# Patient Record
Sex: FEMALE | Race: BLACK OR AFRICAN AMERICAN | NOT HISPANIC OR LATINO | Employment: OTHER | URBAN - METROPOLITAN AREA
[De-identification: names, ages, dates, MRNs, and addresses within clinical notes are randomized per-mention and may not be internally consistent; named-entity substitution may affect disease eponyms.]

---

## 2018-11-09 ENCOUNTER — HOSPITAL ENCOUNTER (INPATIENT)
Facility: HOSPITAL | Age: 68
LOS: 20 days | Discharge: HOME | DRG: 057 | End: 2018-11-29
Attending: PHYSICAL MEDICINE & REHABILITATION | Admitting: PHYSICAL MEDICINE & REHABILITATION
Payer: MEDICARE

## 2018-11-09 DIAGNOSIS — I63.9 CVA (CEREBRAL VASCULAR ACCIDENT): Primary | ICD-10-CM

## 2018-11-09 LAB — POCT GLUCOSE: 272 MG/DL (ref 70–110)

## 2018-11-09 PROCEDURE — 63600175 PHARM REV CODE 636 W HCPCS: Performed by: PHYSICAL MEDICINE & REHABILITATION

## 2018-11-09 PROCEDURE — 12800000 HC REHAB SEMI-PRIVATE ROOM

## 2018-11-09 PROCEDURE — 25000003 PHARM REV CODE 250: Performed by: PHYSICAL MEDICINE & REHABILITATION

## 2018-11-09 RX ORDER — LOSARTAN POTASSIUM 25 MG/1
25 TABLET ORAL DAILY
Status: DISCONTINUED | OUTPATIENT
Start: 2018-11-10 | End: 2018-11-11

## 2018-11-09 RX ORDER — IBUPROFEN 200 MG
24 TABLET ORAL
Status: DISCONTINUED | OUTPATIENT
Start: 2018-11-09 | End: 2018-11-29 | Stop reason: HOSPADM

## 2018-11-09 RX ORDER — AMLODIPINE BESYLATE 5 MG/1
10 TABLET ORAL DAILY
Status: DISCONTINUED | OUTPATIENT
Start: 2018-11-10 | End: 2018-11-29 | Stop reason: HOSPADM

## 2018-11-09 RX ORDER — METFORMIN HYDROCHLORIDE 500 MG/1
1000 TABLET ORAL 2 TIMES DAILY WITH MEALS
Status: DISCONTINUED | OUTPATIENT
Start: 2018-11-09 | End: 2018-11-11 | Stop reason: DRUGHIGH

## 2018-11-09 RX ORDER — NAPROXEN SODIUM 220 MG/1
81 TABLET, FILM COATED ORAL DAILY
Status: DISCONTINUED | OUTPATIENT
Start: 2018-11-10 | End: 2018-11-29 | Stop reason: HOSPADM

## 2018-11-09 RX ORDER — ACETAMINOPHEN 325 MG/1
650 TABLET ORAL EVERY 6 HOURS PRN
Status: DISCONTINUED | OUTPATIENT
Start: 2018-11-09 | End: 2018-11-29 | Stop reason: HOSPADM

## 2018-11-09 RX ORDER — GLUCAGON 1 MG
1 KIT INJECTION
Status: DISCONTINUED | OUTPATIENT
Start: 2018-11-09 | End: 2018-11-29 | Stop reason: HOSPADM

## 2018-11-09 RX ORDER — HYDROCHLOROTHIAZIDE 12.5 MG/1
12.5 CAPSULE ORAL DAILY
Status: DISCONTINUED | OUTPATIENT
Start: 2018-11-10 | End: 2018-11-10

## 2018-11-09 RX ORDER — ATORVASTATIN CALCIUM 40 MG/1
80 TABLET, FILM COATED ORAL NIGHTLY
Status: DISCONTINUED | OUTPATIENT
Start: 2018-11-09 | End: 2018-11-29 | Stop reason: HOSPADM

## 2018-11-09 RX ORDER — IBUPROFEN 200 MG
16 TABLET ORAL
Status: DISCONTINUED | OUTPATIENT
Start: 2018-11-09 | End: 2018-11-29 | Stop reason: HOSPADM

## 2018-11-09 RX ORDER — CLOPIDOGREL BISULFATE 75 MG/1
75 TABLET ORAL DAILY
Status: DISCONTINUED | OUTPATIENT
Start: 2018-11-10 | End: 2018-11-29 | Stop reason: HOSPADM

## 2018-11-09 RX ORDER — ONDANSETRON 4 MG/1
4 TABLET, ORALLY DISINTEGRATING ORAL EVERY 6 HOURS PRN
Status: DISCONTINUED | OUTPATIENT
Start: 2018-11-09 | End: 2018-11-29 | Stop reason: HOSPADM

## 2018-11-09 RX ORDER — INSULIN ASPART 100 [IU]/ML
0-5 INJECTION, SOLUTION INTRAVENOUS; SUBCUTANEOUS
Status: DISCONTINUED | OUTPATIENT
Start: 2018-11-09 | End: 2018-11-29 | Stop reason: HOSPADM

## 2018-11-09 RX ADMIN — ATORVASTATIN CALCIUM 80 MG: 40 TABLET, FILM COATED ORAL at 09:11

## 2018-11-09 RX ADMIN — ACETAMINOPHEN 650 MG: 325 TABLET, FILM COATED ORAL at 09:11

## 2018-11-09 RX ADMIN — INSULIN ASPART 1 UNITS: 100 INJECTION, SOLUTION INTRAVENOUS; SUBCUTANEOUS at 10:11

## 2018-11-09 RX ADMIN — ONDANSETRON 4 MG: 4 TABLET, ORALLY DISINTEGRATING ORAL at 09:11

## 2018-11-10 LAB
ALBUMIN SERPL BCP-MCNC: 3.4 G/DL
ALP SERPL-CCNC: 90 U/L
ALT SERPL W/O P-5'-P-CCNC: 13 U/L
ANION GAP SERPL CALC-SCNC: 17 MMOL/L
AST SERPL-CCNC: 19 U/L
BILIRUB SERPL-MCNC: 0.4 MG/DL
BUN SERPL-MCNC: 69 MG/DL
CALCIUM SERPL-MCNC: 9.8 MG/DL
CHLORIDE SERPL-SCNC: 91 MMOL/L
CO2 SERPL-SCNC: 20 MMOL/L
CREAT SERPL-MCNC: 5.4 MG/DL
ERYTHROCYTE [DISTWIDTH] IN BLOOD BY AUTOMATED COUNT: 12.7 %
EST. GFR  (AFRICAN AMERICAN): 9 ML/MIN/1.73 M^2
EST. GFR  (NON AFRICAN AMERICAN): 8 ML/MIN/1.73 M^2
GLUCOSE SERPL-MCNC: 230 MG/DL
HCT VFR BLD AUTO: 39.2 %
HGB BLD-MCNC: 13.2 G/DL
MCH RBC QN AUTO: 28.8 PG
MCHC RBC AUTO-ENTMCNC: 33.7 G/DL
MCV RBC AUTO: 86 FL
PLATELET # BLD AUTO: 357 K/UL
PMV BLD AUTO: 8.3 FL
POCT GLUCOSE: 230 MG/DL (ref 70–110)
POCT GLUCOSE: 266 MG/DL (ref 70–110)
POTASSIUM SERPL-SCNC: 5.1 MMOL/L
PROT SERPL-MCNC: 7.9 G/DL
RBC # BLD AUTO: 4.58 M/UL
SODIUM SERPL-SCNC: 128 MMOL/L
WBC # BLD AUTO: 14.6 K/UL

## 2018-11-10 PROCEDURE — 97162 PT EVAL MOD COMPLEX 30 MIN: CPT

## 2018-11-10 PROCEDURE — 12800000 HC REHAB SEMI-PRIVATE ROOM

## 2018-11-10 PROCEDURE — 63600175 PHARM REV CODE 636 W HCPCS: Performed by: PHYSICAL MEDICINE & REHABILITATION

## 2018-11-10 PROCEDURE — 25000003 PHARM REV CODE 250: Performed by: PHYSICAL MEDICINE & REHABILITATION

## 2018-11-10 PROCEDURE — 97530 THERAPEUTIC ACTIVITIES: CPT

## 2018-11-10 PROCEDURE — 92523 SPEECH SOUND LANG COMPREHEN: CPT

## 2018-11-10 PROCEDURE — 80053 COMPREHEN METABOLIC PANEL: CPT

## 2018-11-10 PROCEDURE — 36415 COLL VENOUS BLD VENIPUNCTURE: CPT

## 2018-11-10 PROCEDURE — 97167 OT EVAL HIGH COMPLEX 60 MIN: CPT

## 2018-11-10 PROCEDURE — 85027 COMPLETE CBC AUTOMATED: CPT

## 2018-11-10 RX ORDER — ENOXAPARIN SODIUM 100 MG/ML
30 INJECTION SUBCUTANEOUS EVERY 24 HOURS
Status: DISCONTINUED | OUTPATIENT
Start: 2018-11-10 | End: 2018-11-13

## 2018-11-10 RX ORDER — SODIUM CHLORIDE 450 MG/100ML
INJECTION, SOLUTION INTRAVENOUS CONTINUOUS
Status: DISCONTINUED | OUTPATIENT
Start: 2018-11-10 | End: 2018-11-11

## 2018-11-10 RX ORDER — ENOXAPARIN SODIUM 100 MG/ML
40 INJECTION SUBCUTANEOUS EVERY 24 HOURS
Status: DISCONTINUED | OUTPATIENT
Start: 2018-11-10 | End: 2018-11-10 | Stop reason: DRUGHIGH

## 2018-11-10 RX ORDER — SYRING-NEEDL,DISP,INSUL,0.3 ML 29 G X1/2"
296 SYRINGE, EMPTY DISPOSABLE MISCELLANEOUS ONCE
Status: COMPLETED | OUTPATIENT
Start: 2018-11-10 | End: 2018-11-10

## 2018-11-10 RX ADMIN — INSULIN ASPART 3 UNITS: 100 INJECTION, SOLUTION INTRAVENOUS; SUBCUTANEOUS at 04:11

## 2018-11-10 RX ADMIN — ONDANSETRON 4 MG: 4 TABLET, ORALLY DISINTEGRATING ORAL at 04:11

## 2018-11-10 RX ADMIN — ENOXAPARIN SODIUM 30 MG: 100 INJECTION SUBCUTANEOUS at 04:11

## 2018-11-10 RX ADMIN — SODIUM CHLORIDE: 0.45 INJECTION, SOLUTION INTRAVENOUS at 03:11

## 2018-11-10 RX ADMIN — ATORVASTATIN CALCIUM 80 MG: 40 TABLET, FILM COATED ORAL at 08:11

## 2018-11-10 RX ADMIN — ASPIRIN 81 MG CHEWABLE TABLET 81 MG: 81 TABLET CHEWABLE at 08:11

## 2018-11-10 RX ADMIN — AMLODIPINE BESYLATE 10 MG: 5 TABLET ORAL at 09:11

## 2018-11-10 RX ADMIN — LOSARTAN POTASSIUM 25 MG: 25 TABLET ORAL at 09:11

## 2018-11-10 RX ADMIN — SODIUM CHLORIDE: 0.45 INJECTION, SOLUTION INTRAVENOUS at 09:11

## 2018-11-10 RX ADMIN — CLOPIDOGREL BISULFATE 75 MG: 75 TABLET ORAL at 09:11

## 2018-11-10 RX ADMIN — METFORMIN HYDROCHLORIDE 1000 MG: 500 TABLET ORAL at 04:11

## 2018-11-10 RX ADMIN — Medication 296 ML: at 08:11

## 2018-11-10 RX ADMIN — METFORMIN HYDROCHLORIDE 1000 MG: 500 TABLET ORAL at 09:11

## 2018-11-10 NOTE — PT/OT/SLP EVAL
Occupational Therapy  Evaluation    Dennis Patricio   MRN: [id number]   Admitting Diagnosis: R CVA    OT Date of Treatment: 11/10/18   OT Start Time: 1230   OT End Time: 1401  Minutes 91          Billable Minutes:  Evaluation 91  Total Minutes: 91        Past Medical History:   Diagnosis Date    Arthritis     Back pain, chronic     Diabetes mellitus     New in 2018    Hypertension       History reviewed. No pertinent surgical history.    Referring physician: [place]  Date referred to OT: 11-09-18    General Precautions: Standard, fall, diabetic, vision impaired  Orthopedic Precautions: N/A  Braces: N/A    Do you have any cultural, spiritual, Mu-ism conflicts, given your current situation?: none     Patient History:  Living Environment  Lives With: child(eliza), adult, grandchild(eliza)  Living Arrangements: mobile home  Home Accessibility: stairs to enter home  Home Layout: Able to live on 1st floor  Number of Stairs to Enter Home: 4  Stair Railings at Home: outside, present at both sides  Transportation Available: family or friend will provide  Living Environment Comment: (lives in mobile home with son and 13 yo grandson. Patient cooks for family everyday.)  Equipment Currently Used at Home: cane, straight    Prior level of function:   Bed Mobility/Transfers: needs device(uses a SC at times, in community.)  Grooming: independent  Bathing: independent  Upper Body Dressing: independent  Lower Body Dressing: independent  Toileting: independent  Home Management Skills: independent  Homemaking Responsibilities: Yes  Meal Prep Responsibility: Primary  Laundry Responsibility: Primary  Cleaning Responsibility: Primary  Bill Paying/Finance Responsibility: No  Shopping Responsibility: No   Responsibility: Secondary  Driving License: No  Mode of Transportation: Family, Friends  Occupation: Unemployed  Leisure and Hobbies: (cooking for her family and reading romance novels)     Dominant hand:  "right    Subjective:  Communicated with nurse prior to session.    Chief Complaint: " I'm all laid up and I keep fantasizing about food."  Patient/Family stated goals: to return home and resume previous activities    Pain/Comfort  Pain Rating 1: 0/10  Pain Rating Post-Intervention 1: 0/10    Objective:  Patient found with: peripheral IV    Cognitive Exam:  Oriented to: Person, Place, Time and Situation  Follows Commands/attention: Easily distracted and Follows one-step commands  Communication: clear/fluent  Memory:  No Deficits noted  Safety awareness/insight to disability: impaired  Coping skills/emotional control: Appropriate to situation    Visual/perceptual:  Impaired  acuity and motor planning praxis and proprioception.    Physical Exam:  Postural examination/scapula alignment:    -       Lateral weight shift of hips  Skin integrity: Visible skin intact  Edema: None noted     Sensation:      -       Impaired  light/touch absent without visual assist, sharp/dull absent, proprioception severely impaired, temperature impaired and stereognosis absent    Upper Extremity Range of Motion:  Right Upper Extremity: WNL  Left Upper Extremity: WNL    Upper Extremity Strength: unable to participate in MMT 2/2 almost absent sensation with uncontrolled movements  Right Upper Extremity: WNL    Fine motor coordination:      -       Impaired  L UE absent  Gross motor coordination: severely impaired 2/2 lack of sensation, poor proprioception.    Functional Mobility:  Bed Mobility:   Supine to sit: Total Assistance   Sit to supine: Maximum Assistance   Rolling: Activity did not occur   Scooting: Contact Guard Assistance    Transfers:   Sit to stand:Moderate Assistance with No Assistive Device   Bed <> Chair:  Stand Pivot with Maximum Assistance with No Assistive Device but 2 staff to insure safety.  Toilet Transfer:  Pt Stand Pivot with Maximum Assistance with Grab bars   Patient performed shower transfer Stand Pivot with Moderate " Assistance with Grab bars. 2 person assist to insure safety.     Feeding:      Grooming:  Patient peformed hand washing with Moderate Assistance at shower.  Patient performed face washing with Minimal Assistance at shower.  Patient performed oral hygeine with Contact Guard Assistance at bed.    Bathing:  Patient performed bathing with Total Assistance with grab bar, Handheld shower head and shower chair at Shower.    UE Dressing:    LE Dressing:  Patient don/doffed socks with Total Assistance, Patient performed don/doffed adult brief with Total Assistance and Patient performed don/doffed pants with Total Assistance    Toileting:  Pt performed toileting with Total Assistance with Grab  bar at [facility] over toilet.    Balance:   Static Sit: FAIR-: Maintains without assist but inconsistent   Dynamic Sit:  POOR: N/A  Static Stand: 0: Needs MAXIMAL assist to maintain   Dynamic stand: 0: N/A  Patient left HOB elevated with all lines intact and call button in reach    Assessment:  Dennis Patricio is a 68 y.o. female with a medical diagnosis of R CVA and presents with performance deficits of physical skills including impaired balance, mobility, strength, dexterity, fine motor coordination, gross motor coordination and endurance.  These performance deficits have resulted in activity limitations including, but not limited to: bed mobility, transfers, ambulating short distances, navigating around obstacles during ambulation, transitional movement patterns ( kneeling, bending, reaching), upper body dressing, lower body dressing, grooming, toileting, bathing and carrying objects. Severe impairment of L side sensation.  Pt's role as active member of household and family and primary   has been significantly affected.  Patient will benefit from skilled OT services to maximize level of independence with deficits listed above.  .    Rehab potential is good    Activity tolerance: Excellent    Discharge recommendations:    home    Equipment recommendations: (TBA)     GOALS:   Multidisciplinary Problems     Occupational Therapy Goals        Problem: Occupational Therapy Goal    Goal Priority Disciplines Outcome Interventions   Occupational Therapy Goal     OT, PT/OT     Description:  Goals to be met by:     Patient will increase functional independence with ADLs by performing:    Feeding with Modified [place].  UE Dressing with Set-up Assistance.  LE Dressing with Stand-by Assistance.  Grooming while seated with Set-up Assistance.  Toileting from bedside commode over toilet with Minimal Assistance for hygiene and clothing management.   Bathing from  shower chair/bench with Minimal Assistance.  Sitting at edge of bed x15 minutes with Stand-by Assistance while participating in ADL or exercise.  Toilet transfer to bedside commode over toilet with Contact Guard Assistance.                      PLAN: Patient to be seen 6 x/week to address the above listed problems via self-care/home management, community/work re-entry, therapeutic activities, therapeutic exercises, neuromuscular re-education, therapeutic groups, sensory integration, wheelchair management/training  Plan of Care expires:    Plan of Care reviewed with: patient         ORVILLE Dozier  11/10/2018

## 2018-11-10 NOTE — PT/OT/SLP EVAL
"PhysicalTherapy   Evaluation    Dennis Patricio   MRN: [id number]     PT Received On: 11/10/18  PT Start Time: 0800     PT Stop Time: 0900    PT Total Time (min): 60 min       Billable Minutes:  Evaluation 50 and Therapeutic Activity 10  Total Minutes: 60    Diagnosis: New onset right occipital parietal acute ischemic infarct with left hemiparesis.   PT Diagnosis: impaired functional mobility and gait instability    Past Medical History:   Diagnosis Date    Arthritis     Back pain, chronic     Diabetes mellitus     New in 2018    Hypertension       History reviewed. No pertinent surgical history.    Referring physician: Dr. Brink   Date referred to PT: 11/09/2018    General Precautions: Standard, fall, vision impaired  Orthopedic Precautions: N/A   Braces: N/A      Patient History:  Lives With: child(eliza), adult  Living Arrangements: other (see comments)(Pt lives in a trailer)  Home Accessibility: stairs to enter home  Home Layout: Able to live on 1st floor  Number of Stairs to Enter Home: 4  Stair Railings at Home: outside, present at both sides  Transportation Available: family or friend will provide  Living Environment Comment: (Pt will live with her son and her grandson.)  Equipment Currently Used at Home: cane, straight    DME owned (not currently used): none    Previous Level of Function:  Ambulation Skills: needs device(Pt used cane when ambulating in community.)  Transfer Skills: independent  ADL Skills: independent  Work/Leisure Activity: needs device(Pt used cane for activities in the community.)    Subjective:  Communicated with Dr. Brink and RN, Melody,  During PT session.    Chief Complaint: Not being able to control her left upper limb and her back being sore.  Patient goals: "To be able to get up out of bed and move around"  Family goals: Family not present during subjective interview portion of evaluation.    Pain/Comfort  Pain Rating 1: 0/10    Objective:  Patient found supine in bed.  "     Cognitive Exam:  Oriented to: Person, Place, Time and Situation  Follows Commands/attention: Follows multistep  commands  Communication: clear/fluent  Safety awareness/insight to disability: impaired. Pt has impaired vision with a left visual field cut.    Physical Exam:    Skin integrity: Visible skin intact  Edema: None noted     Sensation:      -       Absent light/touch for L UE and L LE.    Upper Extremity Range of Motion:  Refer to OT evaluation for UE ROM.    Upper Extremity Strength:  Refer to OT evaluation for UE strength.    Lower Extremity Range of Motion:  Right Lower Extremity: WFL  Left Lower Extremity: WFL    Lower Extremity Strength:  Right Lower Extremity: WFL  Left Lower Extremity: Deficits: Quadriceps and Hamstrings 4 / 5 ; Hip flexors and Dorsiflexors 3 / 5     Fine motor coordination: See OT evaluation    Gross motor coordination: Not tested  Visual tracking: smooth pursuits and saccades impaired due to L field cut deficit.     Functional Mobility:    Bed Mobility :   Supine to sit: Maximum Assistance for lower extremities.    Sit to supine: Activity did not occur   Rolling: Moderate Assistance with use of bed rails.   Scooting: Moderate Assistance and verbal cueing     Transfers:  Sit to stand:Moderate Assistance with // bars and verbal cues for hand placement and L knee blocked for safety.  Bed <> Chair:  Step Transfer with Maximum Assistance for balance with No Assistive Device and verbal cues for advancement of lower limbs.  Toilet Transfer:  Pt Step Transfer with Moderate Assistance and Maximum Assistance for standing balance with Grab bars and maximal assistance for management of brief.    Wheelchair:  Pt propelled Standard wheelchair x 160 feet on Level tile with  Right upper extremity and Right lower extremity with Maximum Assistance for steering.     Gait:  Patient gait trained FWB/WBAT: bilateral lower extremities 1 trial of  8 feet on level tile with Parallel bars with Maximum  Assistance for balance and L UE assistance.      Stairs: Not evaluated     Balance:   Static Sit: POOR+: Needs MINIMAL assist to maintain  Dynamic Sit:  FAIR: Cannot move trunk without losing balance  Static Stand: 0: Needs MAXIMAL assist to maintain   Dynamic stand: POOR: Needs maximal assist during gait    Patient left up in chair with call button in reach and chair alarm on and pt's daughter present.    Assessment:  Dennis Patricio is a 68 y.o. female with a medical diagnosis of new onset right occipital parietal acute ischemic infarct with left hemiparesis. She presents with decreased strength, decreased functional activity tolerance, impaired balance, and absent sensation to light touch on left upper and lower extremities.. Pt exhibits inattention to her left side due to a left visual field cut. Pt also demonstrates impaired righting reactions; pt is aware of midline in sitting and begins to correct herself, but is not aware of midline when standing. Pt required maximum level of assistance mainly due to balance impairments because pt unable to feel her limb on the ground when standing. Pt to benefit from skilled PT rehab services to address functional impairments.     Rehab potential is good.    Activity tolerance: Good    Plan: plan care intiated    Discharge recommendations: (To be determined)     Equipment recommendations: (To be determined)    GOALS:   Multidisciplinary Problems     Physical Therapy Goals     Not on file                PLAN:    Patient to be seen daily to address the above listed problems via gait training, therapeutic activities, therapeutic exercises, therapeutic groups, neuromuscular re-education, wheelchair management/training  Plan of Care expires: 12/07/18  Plan of Care reviewed with: patient        I certify that I was present in the room directing the student, Sugey Swift, in service delivery and guiding them using my skilled judgment. As the co-signing therapist I have reviewed  the students documentation and am responsible for the treatment, assessment, and plan.     Sugey Swift, SPT 11/10/2018

## 2018-11-10 NOTE — PLAN OF CARE
Problem: Patient Care Overview  Goal: Plan of Care Review  Outcome: Ongoing (interventions implemented as appropriate)  AAOx3 vss denies pain no acute distress noted

## 2018-11-10 NOTE — PLAN OF CARE
Problem: SLP Goal  Goal: SLP Goal  1.  Patient will name common objects with SB assist  2.  Patient will complete visuospatial tasks with MOD assist  3.  Patient will recall 3/3 related objects with MOD assist for use of short term memory strategies  4.  Patient will require MIN assist to name 10 objects in a simple category  5.  Patient will perform simple/moderate problem solving with MIN assist  Speech/Language evaluation completed.

## 2018-11-10 NOTE — H&P
[facility]  Physical Medicine & Rehab  History & Physical    Patient Name: Dennis Patricio  MRN: [id number]  Admission Date: 11/9/2018  Attending Physician: Dominique Brink MD   Primary Care Provider: Primary Doctor No    Subjective:     Principal Problem:  New onset right occipital parietal acute ischemic infarct with left hemiparesis.    HPI:  68-year-old female initially presented to emergency department with complaints of blurred vision, left-sided weakness, unsteady gait, confusion that began 2 days prior to this hospitalization.  Patient was initially seen in the emergency room 2 days prior, noted to have elevated blood pressures and was discharged home with medications.  Patient was readmitted to hospital this time with  inability to get up and walk to the bathroom, felt she could not move her left leg and walk,patient was also unable to  objects with the left hand and felt heavy and numb.  Patient had a CT scan consistent with right posterior acute ischemic infarct, MRI consistent with  right occipital parietal ischemic infarct, carotids without any significant stenosis, echocardiogram with no thrombus or right to left shunt noted.  Patient has been medically stabilized and is transferred down to us for further rehabilitation.    Past Medical History:    Past Medical History:   Diagnosis Date    Arthritis     Back pain, chronic     Diabetes mellitus     New in 2018    Hypertension        Social History:  Lives in a trailer home with a 3 step entry and has been very independent and active taking care of herself prior to this ambulating without any assistive devices.    Family History:  Non-contributory.    Review of Systems: Non-Contributory.    Current Medications:   Current Facility-Administered Medications:     0.45% NaCl infusion, , Intravenous, Continuous, Dominique Brink MD    acetaminophen tablet 650 mg, 650 mg, Oral, Q6H PRN, Dominique Brink MD, 650 mg at  11/09/18 2131    amLODIPine tablet 10 mg, 10 mg, Oral, Daily, Gollamudi H. MD [place], 10 mg at 11/10/18 0900    aspirin chewable tablet 81 mg, 81 mg, Oral, Daily, Gollamudi H. MD [place], 81 mg at 11/10/18 0859    atorvastatin tablet 80 mg, 80 mg, Oral, QHS, Gollamudi H. MD [place], 80 mg at 11/09/18 2130    clopidogrel tablet 75 mg, 75 mg, Oral, Daily, Gollamudi H. MD [place], 75 mg at 11/10/18 0900    dextrose 50% injection 12.5 g, 12.5 g, Intravenous, PRN, Gollamudi H. MD [place]    dextrose 50% injection 25 g, 25 g, Intravenous, PRN, Gollamudi H. MD [place]    enoxaparin injection 30 mg, 30 mg, Subcutaneous, Daily, Gollamudi H. MD [place]    glucagon (human recombinant) injection 1 mg, 1 mg, Intramuscular, PRN, Gollamudi H. MD [place]    glucose chewable tablet 16 g, 16 g, Oral, PRN, Gollamudi H. MD [place]    glucose chewable tablet 24 g, 24 g, Oral, PRN, Gollamudi H. MD [place]    insulin aspart U-100 pen 0-5 Units, 0-5 Units, Subcutaneous, QID (AC + HS) PRN, Gollamudi H. MD [place], 1 Units at 11/09/18 2226    losartan tablet 25 mg, 25 mg, Oral, Daily, Gollamudi H. MD [place], 25 mg at 11/10/18 0900    metFORMIN tablet 1,000 mg, 1,000 mg, Oral, BID WM, Gollamudi H. MD [place], 1,000 mg at 11/10/18 0900    ondansetron disintegrating tablet 4 mg, 4 mg, Oral, Q6H PRN, Gollamudi H. MD [place], 4 mg at 11/09/18 2131    Allergies:  Review of patient's allergies indicates:  No Known Allergies    Physical Exam:  Alert, oriented x3, voices no complaints.  Head and neck is atraumatic, normocephalic, no jugular venous distention noted.  Lungs are clear to auscultation.  Heart with regular rate and rhythm.  Abdomen is soft, nontender, bowel sounds noted.  Complaints of constipation.  Extremities without any edema or calf tenderness noted.  Neurologically speech is intelligible, denies any dysphagia problems.  Cranial nerve exam presents with left visual field cut, left 7th central impairment, rest of the cranial nerve  exam appears to be grossly intact.  Sensory examination presents with dense left shona sensory impairment.  Motor wise has functional active to passive range of motion with good motor strength in the right upper and lower extremity.  Left upper and lower extremity also with functional active to passive range of motion with a good motor strength noted.  Deep tendon reflexes are 1+ and equal bilaterally.  Patient has been continent of bladder and bowel.  Needs assistance with basic ADLs, transfers, mobility.      Assessment/Plan:     Dennis Patricio is a 68 y.o. female being admitted to inpatient rehabilitation on 11/9/2018 for new onset right occipital parietal acute ischemic infarct with left hemiparesis with impaired mobility and ADLs.   Hypertension.  Diabetes mellitus.  Degenerative joint disease.    Plan:  Will have PT, OT, nursing evaluation 3 hr per day for range of motion, PREs, bed mobility, safe functional transfers, ADL, cognitive, adaptive device evaluation, high level ADLs, balance, coordination training, gait training, family training, monitor her blood pressures, renal failure, and be able to discharge her home.        Dominique Brink MD  Department of Physical Medicine & Rehab   [facility]    Post Admission Assessment:    Dennis Patricio is a 68 y.o. female being admitted to inpatient rehabilitation on 11/9/2018 for right sided CVA with left hemiparesis with impaired mobility and ADLs.   Patient is appropriate for inpatient rehabilitation and is expected to tolerate 3 hours of therapy a day or 15 hours of therapy a week.  Patient is expected to benefit from the following therapy services: Physical Therapy, Occupational Therapy and nursing evaluation.  Goals: Supervision to Mod I with Mobility, ADLs, Transfers using LRAD   Precautions:  Fall.  ELOS:  3-4 weeks.  Disposition: Home with family     I have had the opportunity to examine the patient within 24 hours of admission and  have reviewed the Pre-Admission Assessment and find it consistent with my examination and evaluation of the patient. I confirm that this patient is appropriate for admission and treatment in this inpatient rehabilitation hospital, needs intense interdisciplinary rehabilitation care under my direction and is expected to achieve meaningful goals within a reasonable period of time that are consistent with the planned discharge disposition.     The patient will be admitted for inpatient comprehensive interdisciplinary rehabilitation to address the impairments and medical conditions listed above while assessing equipment needs and compensatory strategies, with coordinated interdisciplinary services that will include physical therapy, occupational therapy, and close monitoring and treatment with 24-hour rehabilitative nursing. This interdisciplinary program will be performed under the direction of a physiatrist.

## 2018-11-10 NOTE — PLAN OF CARE
Problem: Occupational Therapy Goal  Goal: Occupational Therapy Goal  Goals to be met by:     Patient will increase functional independence with ADLs by performing:    Feeding with Modified [place].  UE Dressing with Set-up Assistance.  LE Dressing with Stand-by Assistance.  Grooming while seated with Set-up Assistance.  Toileting from bedside commode over toilet with Minimal Assistance for hygiene and clothing management.   Bathing from  shower chair/bench with Minimal Assistance.  Sitting at edge of bed x15 minutes with Stand-by Assistance while participating in ADL or exercise.  Toilet transfer to bedside commode over toilet with Contact Guard Assistance.    OT Evaluation completed and poc established.

## 2018-11-10 NOTE — PT/OT/SLP EVAL
Speech Language Pathology   Evaluation Combo    Dennis Patricio   MRN: [id number]   Admitting Diagnosis: <principal problem not specified>    SLP Treatment Date: 11/10/18  Speech Start Time: 1105     Speech Stop Time: 1150     Speech Total (min): 45 min       TREATMENT BILLABLE MINUTES:  Eval 45     Diagnosis: <principal problem not specified>    Past Medical History:   Diagnosis Date    Arthritis     Back pain, chronic     Diabetes mellitus     New in 2018    Hypertension      History reviewed. No pertinent surgical history.    Has the patient been evaluated by SLP for swallowing? : No  Keep patient NPO?: No General Precautions: Standard,            Social Hx: Patient lives with son and grandson.    Prior diet: Regular/thin liquids.    Occupational/hobbies/homemaking: Cooking.    Subjective:  Patient alert, in bed.  Daughter present.  Patient goals: to go back home.\    Objective:   Speech/Language Evaluation    Cognitive Status:  Behavioral Observations: alert and appropriate-  Memory and Orientation: 1/5 on the MOCA Memory Subtest, 5/6 on Orientation, with inability to recall today's date.    Attention: WFL.  Problem Solving: MAX assist  Pragmatics: WFL  Executive Function: not assessed    Language: yes    Auditory Comprehension: WFL    Verbal Expression: WFL    Motor Speech: WFL    Voice: WFL    Augmentative Alternative Communication: n/a    Reading: TBA    Writing: Wrote name and address with MOD I for visual field deficits    Visual-Spatial: L visual field cut noted    Bedside Swallow Eval:  TBA; Patient had completed her meal on return for assessment.    Assessment:  Dennis Patricio is a 68 y.o. female with a medical diagnosis of <principal problem not specified> and presents with a moderate cognitive-linguistic impairment.  She scored a 14/30 on the MOCA with deficits in visuospatial, naming, divided attention/calculation, divergent and abstract problem solving and STM.         Discharge  recommendations:   Home    Diet recommendations:    Regular/thin    Goals:   Multidisciplinary Problems     SLP Goals     Not on file                 Plan:   Patient to be seen    Planned Interventions:     Plan of Care expires:    Plan of Care reviewed with: patient, daughter  SLP Follow-up?: Yes  SLP - Next Visit Date: 11/11/18           Giuliana Holman CCC-SLP  11/10/2018

## 2018-11-10 NOTE — PROGRESS NOTES
The enoxaparin dose has been adjusted for Dennis Patricio [id number] according to the pharmacy practice protocol.      Based on the patient's Estimated Creatinine Clearance: 11.2 mL/min (A) (based on SCr of 5.4 mg/dL (H))., Body mass index is 26.87 kg/m²., and weight= 80.2 kg (176 lb 11.2 oz), the enoxaparin dose has been adjusted 30 mg every 24 hours.    Thank you,  Jeremy Balbuena,PharmD

## 2018-11-10 NOTE — PLAN OF CARE
Problem: Physical Therapy Goal  Goal: Physical Therapy Goal  Goals to be met by: 2018     Patient will increase functional independence with mobility by performin. Supine to sit with Minimum Assistance  2. Sit to supine with Minimum Assistance  3. Rolling to Left and Right with Minimum Assistance  4. Sit to stand transfer with Minimal Assistance  5. Bed to chair transfer with Minimal Assistance using Rolling Walker  6. Gait  x 150 feet with Minimal Assistance using Rolling Walker.   7. Wheelchair propulsion x 200 feet with Stand-by Assistance using right upper and lower extremities.  8. Ascend/descend 4 steps with bilateral Handrails Minimal Assistance.  Outcome: Ongoing (interventions implemented as appropriate)  PT for evaluation

## 2018-11-11 LAB
ANION GAP SERPL CALC-SCNC: 13 MMOL/L
BUN SERPL-MCNC: 86 MG/DL
CALCIUM SERPL-MCNC: 8.7 MG/DL
CHLORIDE SERPL-SCNC: 91 MMOL/L
CO2 SERPL-SCNC: 19 MMOL/L
CREAT SERPL-MCNC: 5.8 MG/DL
EST. GFR  (AFRICAN AMERICAN): 8 ML/MIN/1.73 M^2
EST. GFR  (NON AFRICAN AMERICAN): 7 ML/MIN/1.73 M^2
GLUCOSE SERPL-MCNC: 156 MG/DL
POCT GLUCOSE: 163 MG/DL (ref 70–110)
POCT GLUCOSE: 172 MG/DL (ref 70–110)
POTASSIUM SERPL-SCNC: 4.3 MMOL/L
PTH-INTACT SERPL-MCNC: 299.4 PG/ML
SODIUM SERPL-SCNC: 123 MMOL/L

## 2018-11-11 PROCEDURE — 12800000 HC REHAB SEMI-PRIVATE ROOM

## 2018-11-11 PROCEDURE — 80048 BASIC METABOLIC PNL TOTAL CA: CPT

## 2018-11-11 PROCEDURE — 97110 THERAPEUTIC EXERCISES: CPT

## 2018-11-11 PROCEDURE — 97535 SELF CARE MNGMENT TRAINING: CPT

## 2018-11-11 PROCEDURE — 92507 TX SP LANG VOICE COMM INDIV: CPT

## 2018-11-11 PROCEDURE — 97530 THERAPEUTIC ACTIVITIES: CPT

## 2018-11-11 PROCEDURE — 63600175 PHARM REV CODE 636 W HCPCS: Performed by: PHYSICAL MEDICINE & REHABILITATION

## 2018-11-11 PROCEDURE — 36415 COLL VENOUS BLD VENIPUNCTURE: CPT

## 2018-11-11 PROCEDURE — 83970 ASSAY OF PARATHORMONE: CPT

## 2018-11-11 PROCEDURE — 25000003 PHARM REV CODE 250: Performed by: PHYSICAL MEDICINE & REHABILITATION

## 2018-11-11 RX ORDER — SODIUM CHLORIDE 9 MG/ML
INJECTION, SOLUTION INTRAVENOUS CONTINUOUS
Status: DISCONTINUED | OUTPATIENT
Start: 2018-11-11 | End: 2018-11-14

## 2018-11-11 RX ADMIN — AMLODIPINE BESYLATE 10 MG: 5 TABLET ORAL at 10:11

## 2018-11-11 RX ADMIN — SODIUM CHLORIDE: 0.9 INJECTION, SOLUTION INTRAVENOUS at 01:11

## 2018-11-11 RX ADMIN — LOSARTAN POTASSIUM 25 MG: 25 TABLET ORAL at 10:11

## 2018-11-11 RX ADMIN — CLOPIDOGREL BISULFATE 75 MG: 75 TABLET ORAL at 10:11

## 2018-11-11 RX ADMIN — ENOXAPARIN SODIUM 30 MG: 100 INJECTION SUBCUTANEOUS at 05:11

## 2018-11-11 RX ADMIN — ATORVASTATIN CALCIUM 80 MG: 40 TABLET, FILM COATED ORAL at 08:11

## 2018-11-11 RX ADMIN — ACETAMINOPHEN 650 MG: 325 TABLET, FILM COATED ORAL at 08:11

## 2018-11-11 RX ADMIN — ASPIRIN 81 MG CHEWABLE TABLET 81 MG: 81 TABLET CHEWABLE at 10:11

## 2018-11-11 NOTE — PLAN OF CARE
Problem: Occupational Therapy Goal  Goal: Occupational Therapy Goal  Goals to be met by:     Patient will increase functional independence with ADLs by performing:    Feeding with Modified [place].  UE Dressing with Set-up Assistance.  LE Dressing with Stand-by Assistance.  Grooming while seated with Set-up Assistance.  Toileting from bedside commode over toilet with Minimal Assistance for hygiene and clothing management.   Bathing from  shower chair/bench with Minimal Assistance.  Sitting at edge of bed x15 minutes with Stand-by Assistance while participating in ADL or exercise.  Toilet transfer to bedside commode over toilet with Contact Guard Assistance.     Outcome: Ongoing (interventions implemented as appropriate)  OT for self care and functional mobility

## 2018-11-11 NOTE — PLAN OF CARE
"Problem: SLP Goal  Goal: SLP Goal  1.  Patient will name common objects with SB assist  2.  Patient will complete visuospatial tasks with MOD assist  3.  Patient will recall 3/3 related objects with MOD assist for use of short term memory strategies  4.  Patient will require MIN assist to name 10 objects in a simple category  5.  Patient will perform simple/moderate problem solving with MIN assist   Outcome: Ongoing (interventions implemented as appropriate)  Patient unable to name pictured objects stating they were "blurry" and she did not have her glasses.  Patient required MAX assist for simple category exclusion as problem solving.  MOD assist for simple convergent naming.  Patient recalled 3/3 related items IND after 1 minute delay, and with MIN assist after 3 minute delay with distraction.      "

## 2018-11-11 NOTE — PT/OT/SLP PROGRESS
Physical Therapy         Treatment        Dennis Patricio   MRN: [id number]     PT Received On: 18  Total Time (min): 65      Pt was unavailable on three earlier attempts due to with ST, and nursing procedure.    Billable Minutes:  Therapeutic Activity 15 and Therapeutic Exercise 45  Total Minutes: 60    Treatment Type: Treatment  PT/PTA: PT     PTA Visit Number: 0       General Precautions: Standard, fall  Orthopedic Precautions: Orthopedic Precautions : N/A     Subjective:  Communicated with nurse prior to session.    Pain/Comfort  Pain Rating 1: 510  Location - Side 1: Left  Location 1: foot  Pain Addressed 1: Reposition, Distraction, Cessation of Activity  Pain Rating Post-Intervention 1: 5/10    Objective:  Patient found supine in bed, nurse present.  Patient found with: bed alarm    Functional Mobility:  Bed Mobility:   Supine to sit: Maximum Assistance   Sit to supine: Moderate Assistance     Transfer Training:  Sit to stand:Moderate Assistance and Maximum Assistance with No Assistive Device and Grab bars  x 3  Bed <> Chair:  Stand Pivot with Moderate Assistance and Maximum Assistance with No Assistive Device to/from bed    Wheelchair Trainin' x 2 with mod/max assist and cues    Additional Treatment:  SUPINE: SLR, hip abd/add, heelslides, ankle DF, bridging, x 20 reps each  SEATED: LAQ, hip flexion, hip adduction with ball, hip abduction with blue tband, x 20 reps each  SciFit StepOne: L 2.0 x 12 min, LEs only  STATIC STAND BALANCE TRAINING: in // bars: x 1 rep x ~ 5 seconds, d/c'd due to L foot pain    Activity Tolerance:  Patient somewhat limited by fatigue and Patient limited by pain.    Patient left supine with call button in reach and bed alarm on.    Rehab potential is good.    Activity tolerance: Fair    Discharge recommendations:       Equipment recommendations:       GOALS:   Multidisciplinary Problems     Physical Therapy Goals        Problem: Physical Therapy Goal    Goal Priority  Disciplines Outcome Goal Variances Interventions   Physical Therapy Goal     PT, PT/OT Ongoing (interventions implemented as appropriate)     Description:  Goals to be met by: 2018     Patient will increase functional independence with mobility by performin. Supine to sit with Minimum Assistance  2. Sit to supine with Minimum Assistance  3. Rolling to Left and Right with Minimum Assistance  4. Sit to stand transfer with Minimal Assistance  5. Bed to chair transfer with Minimal Assistance using Rolling Walker  6. Gait  x 150 feet with Minimal Assistance using Rolling Walker.   7. Wheelchair propulsion x 200 feet with Stand-by Assistance using right upper and lower extremities.  8. Ascend/descend 4 steps with bilateral Handrails Minimal Assistance.                    PLAN:    Patient to be seen daily  to address the above listed problems via gait training, therapeutic exercises, therapeutic activities, neuromuscular re-education, wheelchair management/training  Plan of Care expires: 18  Plan of Care reviewed with: patient         Shashank LOCK Rufus, PT 2018

## 2018-11-11 NOTE — PLAN OF CARE
Problem: Patient Care Overview  Goal: Plan of Care Review  Patient receiving 0.45% saline IV  At 75 ml./hr.to improve kidney function.

## 2018-11-11 NOTE — PT/OT/SLP PROGRESS
"Speech Language Pathology Treatment          Dennis Patricio   MRN: [id number]     Diet recommendations: Regular/thin    SLP Treatment Date: 11/11/18  Speech Start Time: 1148     Speech Stop Time: 1248     Speech Total (min): 60 min       TREATMENT BILLABLE MINUTES:  Speech Therapy Individual 60    General Precautions: Standard,            Subjective:  Patient pleasant and cooperative.  C/o her stomach being upset before and during tx.  Toward end of treatment she reported having been up in w/c a long time and that her legs were feeling numb.  Nursing notified Patient requested to return to bed.    Objective:       1.  Patient will name common objects with SB assist  2.  Patient will recall 3/3 related objects with MOD assist for use of short term memory strategies  3.  Patient will require MIN assist to name 10 objects in a simple category  4.  Patient will perform simple/moderate problem solving with MIN assist        Assessment:  Dennis Patricio is a 68 y.o. female with a SLP diagnosis of Cognitive-Linguistic Impairment.  Patient unable to name pictured objects stating they were "blurry" and she did not have her glasses.  Patient required MAX assist for simple category exclusion as problem solving.  MOD assist for simple convergent naming.  Patient recalled 3/3 related items IND after 1 minute delay, and with MIN assist after 3 minute delay with distraction.    Diet recommendations:  BSE to be completed    Discharge recommendations:       Goals:   Multidisciplinary Problems     SLP Goals        Problem: SLP Goal    Goal Priority Disciplines Outcome   SLP Goal     SLP Ongoing (interventions implemented as appropriate)   Description:  1.  Patient will name common objects with SB assist  2.  Patient will complete visuospatial tasks with MOD assist  3.  Patient will recall 3/3 related objects with MOD assist for use of short term memory strategies  4.  Patient will require MIN assist to name 10 objects in a simple " category  5.  Patient will perform simple/moderate problem solving with MIN assist                     Plan:   Patient to be seen     Planned Interventions:    Plan of Care Expires:    Plan of Care reviewed with: patient  SLP Follow-up?: Yes  SLP - Next Visit Date: 11/12/18           Giuliana Holman CCC-SLP 11/11/2018

## 2018-11-11 NOTE — PROGRESS NOTES
Metformin therapy for Dennis Patricio [id number] has been evaluated according to the pharmacy practice protocol.      Lab Results   Component Value Date/Time    CREATININE 5.8 (H) 11/11/2018 06:37 AM       Metformin therapy has been discontinued.  Metformin therapy can be resumed once SCr < 1.4 mg/dL (female) or 1.5 mg/dL (male) or at physician's discretion.    Thank you,  Jeremy Balbuena, PharmD

## 2018-11-11 NOTE — PT/OT/SLP PROGRESS
Occupational Therapy  Treatment    Dennis Patricio   MRN: [id number]   Admitting Diagnosis: New onset right occipital parietal acute ischemic infarct with left hemiparesis.     OT Date of Treatment: 11/11/18   Total Time (min): 63 min      Billable Minutes:  Self Care/Home Management 63  Total Minutes: 63    General Precautions: Standard, fall  Orthopedic Precautions: N/A  Braces: N/A    Do you have any cultural, spiritual, Buddhist conflicts, given your current situation?: none    Subjective:  Communicated with nurse prior to session.    Pain Rating 1: 0/10                   Objective:  Patient found with: bed alarm, peripheral IV    Functional Mobility:  Bed Mobility:   MaxA    Transfer Training:  ModA x1 with SBA to CGA of 2nd person bed to w/c with verbal and physical assist.    Feeding:  Set up/supervision    Grooming:  CGA in sitting for oral care.    UE Dressing:  MaxA    LE Dressing:  MaxA      Balance:   Static Sit: Supervision  Dynamic Sit:  SBA  Static Stand: ModA   Dynamic stand: ModA x1 with close SBA CGA of 2nd person for safety.        Additional Treatment:  Pt provided and instructed in use of dressing stick and sock cone for LB dressing and required assist with all steps of process.  Pt instructed in use of the LUE as stabilizer and the RUE as the worker in bj tasks such as opening containers and applying paste to toothbrush.  Pt required assist to visually locate targets and instruction and physical assist to aim to targets with increased time to increase accuracy of placement and success of end goal.  Education regarding deficits and impact on functional task performance and methods     Patient left up in chair with call button in reach, chair alarm on, nurse notified and patient sitting bedside in w/c in her room.    ASSESSMENT:  Dennis Patricio presents with ataxic movements of the LUE resulting in difficulty in using the extremity accurately. She demonstrates impaired ability to maintain  grasp on an object and move it appropriately with LUE.   Impaired sensation and visual deficits further complicate successful use of the extremity and coordination of the LLE in performance of self care task of dressing.      Rehab potential is good    Activity tolerance: Good    Discharge recommendations: home(HH vrs OP)     Equipment recommendations: (TBD)     GOALS:   Multidisciplinary Problems     Occupational Therapy Goals        Problem: Occupational Therapy Goal    Goal Priority Disciplines Outcome Interventions   Occupational Therapy Goal     OT, PT/OT Ongoing (interventions implemented as appropriate)    Description:  Goals to be met by:     Patient will increase functional independence with ADLs by performing:    Feeding with Modified [place].  UE Dressing with Set-up Assistance.  LE Dressing with Stand-by Assistance.  Grooming while seated with Set-up Assistance.  Toileting from bedside commode over toilet with Minimal Assistance for hygiene and clothing management.   Bathing from  shower chair/bench with Minimal Assistance.  Sitting at edge of bed x15 minutes with Stand-by Assistance while participating in ADL or exercise.  Toilet transfer to bedside commode over toilet with Contact Guard Assistance.                      Plan:  Patient to be seen 6 x/week to address the above listed problems via self-care/home management, therapeutic activities, community/work re-entry, therapeutic exercises, therapeutic groups, neuromuscular re-education, sensory integration, wheelchair management/training  Plan of Care expires:    Plan of Care reviewed with: patient         Caterina Navas OT  11/11/2018

## 2018-11-11 NOTE — CONSULTS
Consult Note  Nephrology    Consult Requested By: Dominique Brink MD    Reason for Consult: RUT    SUBJECTIVE:     History of Present Illness:  68 e/o female pt, admitted to rehab unit 11/9 s/p CVA.  Initial labs show BUN/Scr 69/5.4, higher today 86/5.8.  Renal is consulted for comanagement.      Addendum:  Pt was a transfer from [facility] on 11/9.  Reviewed chart that was sent, Scr was 1.0 on 11/7.  Glucophage has been discontinued.  Slowed IVF from 150 to 100, BPs running high. Per report, pt only eating hotdogs--d/w RN, family needs to stop bringing them.    Assessment/plan:    1.  RUT-- d/c'd losartan for now.  OK w/IVF, but change from 1/2 NS to NS d/t hyopnatremia.  Renal US, renal panel in am, UA today.  No glucophage.   2.  Hyponatremia--change IVF to NS.    3.  DM 2--blood glucose control per primary team  4.  HTN--Can continue amlodipine, holding losartan for RUT.    Past Medical History:   Diagnosis Date    Arthritis     Back pain, chronic     Diabetes mellitus     New in 2018    Hypertension      History reviewed. No pertinent surgical history.  History reviewed. No pertinent family history.  Social History     Tobacco Use    Smoking status: Former Smoker     Packs/day: 0.00   Substance Use Topics    Alcohol use: No    Drug use: No       Review of patient's allergies indicates:  No Known Allergies     Review of Systems:  General ROS: negative for - fever or night sweats  Psychological ROS: negative for - behavioral disorder or depression  ENT ROS: negative for - headaches or visual changes  Hematological and Lymphatic ROS: negative for - bleeding problems or bruising  Endocrine ROS: negative for - temperature intolerance or unexpected weight changes  Respiratory ROS: no cough, shortness of breath, or wheezing  Cardiovascular ROS: no chest pain or dyspnea on exertion  Gastrointestinal ROS: no abdominal pain, change in bowel habits, or black or bloody stools  Genito-Urinary ROS: no dysuria, trouble  voiding, or hematuria  Musculoskeletal ROS: negative for - joint pain or joint swelling  Dermatological ROS: negative for rash and skin lesion changes    OBJECTIVE:     Vital Signs Range (Last 24H):  Temp:  [97.9 °F (36.6 °C)-98.2 °F (36.8 °C)]   Pulse:  [85-89]   Resp:  [18-20]   BP: (123-146)/(71-74)   SpO2:  [96 %-99 %]     Physical Exam:  General- Patient alert and oriented x3 in NAD  HEENT- WNL  Neck- supple  CV- Regular rate and rhythm  Resp-  No increased WOB  GI- Non tender/non-distended,  Extrem- No cyanosis, clubbing, edema.  Derm- No rashes, masses, or lesions noted  Neuro-  No flap.     Body mass index is 26.87 kg/m².    Laboratory:  CBC:   Recent Labs   Lab 11/10/18  0637   WBC 14.60*   RBC 4.58   HGB 13.2   HCT 39.2   *   MCV 86   MCH 28.8   MCHC 33.7     CMP:   Recent Labs   Lab 11/10/18  0637 11/11/18  0637   * 156*   CALCIUM 9.8 8.7   ALBUMIN 3.4*  --    PROT 7.9  --    * 123*   K 5.1 4.3   CO2 20* 19*   CL 91* 91*   BUN 69* 86*   CREATININE 5.4* 5.8*   ALKPHOS 90  --    ALT 13  --    AST 19  --    BILITOT 0.4  --        Diagnostic Results:  Labs: Reviewed      ASSESSMENT/PLAN:     Active Hospital Problems    Diagnosis  POA    CVA (cerebral vascular accident) [I63.9]  Yes      Resolved Hospital Problems   No resolved problems to display.         Thank you for allowing us to participate in the care of your patient. We will follow the patient and provide recommendations as needed.      Time spent seeing patient( greater than 1/2 spent in direct contact) :

## 2018-11-12 LAB
ALBUMIN SERPL BCP-MCNC: 2.7 G/DL
ALBUMIN SERPL BCP-MCNC: 2.7 G/DL
ALP SERPL-CCNC: 74 U/L
ALT SERPL W/O P-5'-P-CCNC: 17 U/L
AMORPH CRY URNS QL MICRO: ABNORMAL
ANION GAP SERPL CALC-SCNC: 10 MMOL/L
ANION GAP SERPL CALC-SCNC: 10 MMOL/L
AST SERPL-CCNC: 34 U/L
BACTERIA #/AREA URNS HPF: ABNORMAL /HPF
BASOPHILS # BLD AUTO: 0 K/UL
BASOPHILS NFR BLD: 0.2 %
BILIRUB SERPL-MCNC: 0.4 MG/DL
BILIRUB UR QL STRIP: NEGATIVE
BUN SERPL-MCNC: 59 MG/DL
BUN SERPL-MCNC: 59 MG/DL
CALCIUM SERPL-MCNC: 9.2 MG/DL
CALCIUM SERPL-MCNC: 9.2 MG/DL
CHLORIDE SERPL-SCNC: 102 MMOL/L
CHLORIDE SERPL-SCNC: 102 MMOL/L
CLARITY UR: ABNORMAL
CO2 SERPL-SCNC: 22 MMOL/L
CO2 SERPL-SCNC: 22 MMOL/L
COLOR UR: YELLOW
CREAT SERPL-MCNC: 2.6 MG/DL
CREAT SERPL-MCNC: 2.6 MG/DL
DIFFERENTIAL METHOD: ABNORMAL
EOSINOPHIL # BLD AUTO: 0.2 K/UL
EOSINOPHIL NFR BLD: 1.7 %
ERYTHROCYTE [DISTWIDTH] IN BLOOD BY AUTOMATED COUNT: 12.8 %
EST. GFR  (AFRICAN AMERICAN): 21 ML/MIN/1.73 M^2
EST. GFR  (AFRICAN AMERICAN): 21 ML/MIN/1.73 M^2
EST. GFR  (NON AFRICAN AMERICAN): 18 ML/MIN/1.73 M^2
EST. GFR  (NON AFRICAN AMERICAN): 18 ML/MIN/1.73 M^2
GLUCOSE SERPL-MCNC: 159 MG/DL
GLUCOSE SERPL-MCNC: 159 MG/DL
GLUCOSE UR QL STRIP: NEGATIVE
HCT VFR BLD AUTO: 35.4 %
HGB BLD-MCNC: 11.9 G/DL
HGB UR QL STRIP: ABNORMAL
KETONES UR QL STRIP: NEGATIVE
LEUKOCYTE ESTERASE UR QL STRIP: ABNORMAL
LYMPHOCYTES # BLD AUTO: 0.9 K/UL
LYMPHOCYTES NFR BLD: 9.4 %
MCH RBC QN AUTO: 28.5 PG
MCHC RBC AUTO-ENTMCNC: 33.6 G/DL
MCV RBC AUTO: 85 FL
MICROSCOPIC COMMENT: ABNORMAL
MONOCYTES # BLD AUTO: 1.6 K/UL
MONOCYTES NFR BLD: 16 %
NEUTROPHILS # BLD AUTO: 7.1 K/UL
NEUTROPHILS NFR BLD: 72.7 %
NITRITE UR QL STRIP: NEGATIVE
NON-SQ EPI CELLS #/AREA URNS HPF: 2 /HPF
PH UR STRIP: 6 [PH] (ref 5–8)
PHOSPHATE SERPL-MCNC: 2.9 MG/DL
PHOSPHATE SERPL-MCNC: 2.9 MG/DL
PLATELET # BLD AUTO: 383 K/UL
PMV BLD AUTO: 8.2 FL
POCT GLUCOSE: 166 MG/DL (ref 70–110)
POCT GLUCOSE: 288 MG/DL (ref 70–110)
POTASSIUM SERPL-SCNC: 4.9 MMOL/L
POTASSIUM SERPL-SCNC: 4.9 MMOL/L
PROT SERPL-MCNC: 6.8 G/DL
PROT UR QL STRIP: NEGATIVE
RBC # BLD AUTO: 4.18 M/UL
RBC #/AREA URNS HPF: >100 /HPF (ref 0–4)
SODIUM SERPL-SCNC: 134 MMOL/L
SODIUM SERPL-SCNC: 134 MMOL/L
SP GR UR STRIP: <=1.005 (ref 1–1.03)
SQUAMOUS #/AREA URNS HPF: 2 /HPF
URN SPEC COLLECT METH UR: ABNORMAL
UROBILINOGEN UR STRIP-ACNC: NEGATIVE EU/DL
WBC # BLD AUTO: 9.7 K/UL
WBC #/AREA URNS HPF: 8 /HPF (ref 0–5)
WBC CLUMPS URNS QL MICRO: ABNORMAL
YEAST URNS QL MICRO: ABNORMAL

## 2018-11-12 PROCEDURE — 97542 WHEELCHAIR MNGMENT TRAINING: CPT

## 2018-11-12 PROCEDURE — 97127 HC THERAPEUTIC INTVTN, COGN FUNCTION - ST: CPT

## 2018-11-12 PROCEDURE — 80069 RENAL FUNCTION PANEL: CPT

## 2018-11-12 PROCEDURE — 97535 SELF CARE MNGMENT TRAINING: CPT

## 2018-11-12 PROCEDURE — 12800000 HC REHAB SEMI-PRIVATE ROOM

## 2018-11-12 PROCEDURE — 63600175 PHARM REV CODE 636 W HCPCS: Performed by: PHYSICAL MEDICINE & REHABILITATION

## 2018-11-12 PROCEDURE — 85025 COMPLETE CBC W/AUTO DIFF WBC: CPT

## 2018-11-12 PROCEDURE — 36415 COLL VENOUS BLD VENIPUNCTURE: CPT

## 2018-11-12 PROCEDURE — 25000003 PHARM REV CODE 250: Performed by: PHYSICAL MEDICINE & REHABILITATION

## 2018-11-12 PROCEDURE — 97530 THERAPEUTIC ACTIVITIES: CPT

## 2018-11-12 PROCEDURE — 97112 NEUROMUSCULAR REEDUCATION: CPT

## 2018-11-12 PROCEDURE — 80053 COMPREHEN METABOLIC PANEL: CPT

## 2018-11-12 PROCEDURE — 81000 URINALYSIS NONAUTO W/SCOPE: CPT

## 2018-11-12 PROCEDURE — 92507 TX SP LANG VOICE COMM INDIV: CPT

## 2018-11-12 RX ORDER — POLYETHYLENE GLYCOL 3350 17 G/17G
17 POWDER, FOR SOLUTION ORAL DAILY
Status: DISCONTINUED | OUTPATIENT
Start: 2018-11-12 | End: 2018-11-29 | Stop reason: HOSPADM

## 2018-11-12 RX ORDER — TRAMADOL HYDROCHLORIDE 50 MG/1
50 TABLET ORAL 3 TIMES DAILY PRN
Status: DISCONTINUED | OUTPATIENT
Start: 2018-11-12 | End: 2018-11-29 | Stop reason: HOSPADM

## 2018-11-12 RX ORDER — LACTULOSE 10 G/15ML
20 SOLUTION ORAL DAILY PRN
Status: DISCONTINUED | OUTPATIENT
Start: 2018-11-12 | End: 2018-11-29 | Stop reason: HOSPADM

## 2018-11-12 RX ADMIN — POLYETHYLENE GLYCOL 3350 17 G: 17 POWDER, FOR SOLUTION ORAL at 09:11

## 2018-11-12 RX ADMIN — ATORVASTATIN CALCIUM 80 MG: 40 TABLET, FILM COATED ORAL at 08:11

## 2018-11-12 RX ADMIN — ENOXAPARIN SODIUM 30 MG: 100 INJECTION SUBCUTANEOUS at 04:11

## 2018-11-12 RX ADMIN — ACETAMINOPHEN 650 MG: 325 TABLET, FILM COATED ORAL at 08:11

## 2018-11-12 RX ADMIN — LACTULOSE 20 G: 20 SOLUTION ORAL at 09:11

## 2018-11-12 RX ADMIN — INSULIN ASPART 3 UNITS: 100 INJECTION, SOLUTION INTRAVENOUS; SUBCUTANEOUS at 04:11

## 2018-11-12 RX ADMIN — ASPIRIN 81 MG CHEWABLE TABLET 81 MG: 81 TABLET CHEWABLE at 09:11

## 2018-11-12 RX ADMIN — SODIUM CHLORIDE: 0.9 INJECTION, SOLUTION INTRAVENOUS at 05:11

## 2018-11-12 RX ADMIN — CLOPIDOGREL BISULFATE 75 MG: 75 TABLET ORAL at 09:11

## 2018-11-12 RX ADMIN — AMLODIPINE BESYLATE 10 MG: 5 TABLET ORAL at 09:11

## 2018-11-12 NOTE — PLAN OF CARE
Problem: SLP Goal  Goal: SLP Goal  1.  Patient will name common objects with SB assist  2.  Patient will complete visuospatial tasks with MOD assist  3.  Patient will recall 3/3 related objects with MOD assist for use of short term memory strategies  4.  Patient will require MIN assist to name 10 objects in a simple category  5.  Patient will perform simple/moderate problem solving with MIN assist   Outcome: Ongoing (interventions implemented as appropriate)  Continue current POC

## 2018-11-12 NOTE — PLAN OF CARE
Overall Plan of Care      Dennis Patricio   MRN: [id number]  Admit Date/Time: 11/9/2018  4:39 PM   Admitting Diagnosis:  Right occipital parietal acute ischemic infarct with left hemiparesis.    Estimated Length of Stay (days):  3-4 weeks.    1.  Medical Prognosis:     I have reviewed each team member's Treatment Plan and the current list of barriers/impairments and limitations for this patient.  I approve the goals and recommendations outlined in the Transdisciplinary Plan of Care     1. Assessment:      Medical Prognosis       [  x ]  Improvement expected in admitting condition, with associated             Functional improvement       [   ] Medical condition is chronic or deteriorating, but the patient          Should have functional improvement       [   ]  Other:        Medical/Nursing Interventions:    [ x ]  Fall Prevention Program                                 [ x ]  Adequate Nutrition/Hydration    [x  ]  Monitor Skin Condition                                   [x  ]  Adaptive Equipment    [x  ]  Implement Rehab Therapy Goals                  [x  ]  Bowel and Bladder Program    [  ]  Monitor Surgical Site Healing                         [  ]  Manage Swallowing disorder    [  ]  Manage Swallowing Disorder                         [ x ]  Pain Management    [x  ]  Effectiveness of Medications                          [x  ]  Patient / Family Education    [  ]  Manage Risk of UTI (Watkins Care)                   [  ]  Diabetic Teaching, Blood Sugar                                                                                      Checks / Insulin Administration    [  ]  Peg Tube Feeds                                             [ x ]  Frequent Vitals/Neuro                                                                                       Assessments and/or Changes     [  ] Trach Care/Education           [  ]  Ostomy Care/Education      Therapy Plan:    Physical Therapy:  Intensity (hrs/day): 1-1.5  Frequency  (day/wk:) 7  Estimated Discharge Date: 12/07/18     PT Treatment Plan:  Plan: gait training, therapeutic exercises, therapeutic activities, neuromuscular re-education, wheelchair management/training      Occupational Therapy:  Intensity (hrs/day): 1-1.5  Frequency (day/wk:) 7  Estimated Discharge Date:       OT Treatment Plan:  OT Plan: self-care/home management, therapeutic activities, community/work re-entry, therapeutic exercises, therapeutic groups, neuromuscular re-education, sensory integration, wheelchair management/training    SLP:  Intensity (hrs/day): 1-1.5  Frequency (day/wk:) 5  Estimated Discharge Date:      SLP Treatment Plan:  SLP Plan: Therapy Frequency: 5 x/week      Therapy Recommendations:    Therapy Discharge Recommendations: home( vrs OP)     Therapy Equipment Recommendations: (TBD)          Anticipated Functional Outcome:    [  ]  Independent    [  ]  Modified Independent    [x  ]  Requiring Supervision / Assistance      Discharge Planning:    Discharge Disposition:    [  ]  Home with Home Health    [x  ]  Home with Oupatient    [  ]  Assisted Living Facility      Team Goal:    To enable the patient's safe return to the home or a community based environment upon discharge.       Dominique Brink MD  11/12/2018  8:39 AM

## 2018-11-12 NOTE — PT/OT/SLP PROGRESS
"Speech Language Pathology Treatment          Dennis Patricio   MRN: [id number]     SLP Treatment Date: 11/12/18  Speech Start Time: 1555     Speech Stop Time: 1615     Speech Total (min): 20 min       TREATMENT BILLABLE MINUTES:  Speech Therapy Individual 20 and Total Time 20    General Precautions: Standard, fall, vision impaired  Current Respiratory Status: room air       Subjective:  "I don't know about no speech"  "I guess we been working on that"  Pt alert and cooperative     Objective:    Patient found with: perineural catheter, de la cruz catheter  Given functional categories related to Pt interest, she provided an average of 8 items across 6 opportunities. When prompted to provide verbal sequencing of ADLs, she was able to generate comprehensive and organized responses. Instruction provided on association and visualization strategies to enhance recall. Maximum assistance warranted for setup, therefore recommend ongoing instruction of reinforcement to encourage functional use.     Pain/Comfort  Pain Rating 1: 0/10    Assessment:  Dennis Patricio is a 68 y.o. female with a SLP diagnosis of Cognitive-Linguistic Impairment.       Discharge recommendations: Discharge Facility/Level Of Care Needs: (TBD)     Goals:   Multidisciplinary Problems     SLP Goals        Problem: SLP Goal    Goal Priority Disciplines Outcome   SLP Goal     SLP Ongoing (interventions implemented as appropriate)   Description:  1.  Patient will name common objects with SB assist  2.  Patient will complete visuospatial tasks with MOD assist  3.  Patient will recall 3/3 related objects with MOD assist for use of short term memory strategies  4.  Patient will require MIN assist to name 10 objects in a simple category  5.  Patient will perform simple/moderate problem solving with MIN assist                     Plan:   Patient to be seen Therapy Frequency: 5 x/week   Planned Interventions: Cognitive-Linguistic Therapy  Plan of Care Expires: " 11/26/18  Plan of Care reviewed with: patient  SLP Follow-up?: Yes  SLP - Next Visit Date: 11/13/18           Vasile Cook CCC-SLP 11/12/2018

## 2018-11-12 NOTE — PLAN OF CARE
Problem: Occupational Therapy Goal  Goal: Occupational Therapy Goal  Goals to be met by:     Patient will increase functional independence with ADLs by performing:    Feeding with Modified [place].  UE Dressing with Set-up Assistance.  LE Dressing with Stand-by Assistance.  Grooming while seated with Set-up Assistance.   Shower transfer to TTB or shower chair with Beth and wall bar.  Bathing from  shower chair/bench with Minimal Assistance.  Toileting from bedside commode over toilet with Minimal Assistance for hygiene and clothing management.  Toilet transfer to bedside commode over toilet with Contact Guard Assistance.   Sitting at edge of bed x15 minutes with Stand-by Assistance while participating in ADL or exercise.   Outcome: Ongoing (interventions implemented as appropriate)  OT tx for self care and NME.

## 2018-11-12 NOTE — PLAN OF CARE
Met with patient to complete social assessment.  Patient 's daughter, Yenni, present.  Yenni's contact number is [phone].  Patient provided she lives with her son,Jean Pierre, and grandson. Patient reports her son does work.  Patient reports she was independent with adls, cooking and cleaning.  Stated she would occasionally use a cane and she did not drive.  Patient lives in a mobile home with 3 steps to enter.  DME patient has is a cane.  Patient has not had HH services in the past.  Patient's daughter states she will be able assist patient upon discharge, she stated she is in the process of moving and if need be patient can stay with her and she currently does not work.  PCP is Dr. Romero at [facility], but daughter states patient will be changing doctors.  Pharmacy used is [facility] on [address] in [place].  Time given for Q&A.  Contact information for case management provided.  Will continue to assist with discharge planning as needed.

## 2018-11-12 NOTE — PLAN OF CARE
Problem: Patient Care Overview  Goal: Plan of Care Review  Plan of Care Review    Comments: A&O. Incontinent of bowel. Watkins in place. Bed and chair alarms utilized at all times. Mod assist. Remained free from falls. Standard precautions maintained.

## 2018-11-12 NOTE — PT/OT/SLP PROGRESS
Physical Therapy         Treatment        Dennis Patricio   MRN: [id number]     PT Received On: 11/12/18  Total Time (min): 60        Billable Minutes:  Therapeutic Activity 30, Neuromuscular Re-education 20 and Train/Wheelchair Management 10  Total Minutes: 60    Treatment Type: Treatment  PT/PTA: PT(with SPT)     PTA Visit Number: 0       General Precautions: Standard, fall, vision impaired  Orthopedic Precautions: Orthopedic Precautions : N/A   Braces: Braces: N/A     Subjective:  Communicated with OTCaterina, prior to session. Pt reports needing to use the restroom prior to exercise.     Pain/Comfort  Pain Rating 1: 0/10    Objective:  Patient found seated in wheelchair in pt room. Patient found with: peripheral IV, de la cruz catheter    Functional Mobility:    Balance:   Static Sit: FAIR+: Able to take MINIMAL challenges from all directions  Dynamic Sit:  FAIR+: Maintains balance through MINIMAL excursions of active trunk motion  Static Stand: POOR: Needs MODERATE assist to maintain  Dynamic stand: POOR: Needs MOD (moderate) assist during gait    Transfer Training:  Sit to stand: Maximum Assistance with parallel bars x 2 trials with L knee blocked for safety.   Toilet Transfer:  Pt Step Transfer to toilet with Moderate Assistance with use of grab bars and Stand Pivot from toilet to wheelchair with Total Assistance with Grab bars. Pt Dependent with hygiene and clothing management.    Wheelchair Training:  Pt propelled Standard wheelchair x 200 feet on Level tile with  Right upper extremity and Right lower extremity with Moderate Assistance.     Gait Training:  Pt gait trained two steps in parallel bars with Maximum Assistance and L knee blocked for safety with pt's L UE noted to have increased tone pulling into extensor synergy while standing.     Additional Treatment:  Standing tolerance and lateral weight shifting in // bars to increase tolerance to weight bearing on left lower extremity.     Seated neuromuscular  education: Ankle Pumps, LAQ, and Hip Flexion 3 x 10 each on Left lower extremity.     Activity Tolerance:  Patient limited by fatigue and Patient limited by pain    Patient left up in chair with all lines intact, call button in reach and chair alarm on.    Assessment:  Dennis Patricio is a 68 y.o. female with a medical diagnosis of new onset right occipital parietal acute ischemic infarct with left hemiparesis. She presents with decreased strength, decreased functional activity tolerance, impaired balance, and absent sensation to light touch on left upper and lower extremities.  Pt to benefit from skilled PT rehab services to address functional impairments. Pt initially exhibited pain in left lower extremity and was sensitive to touch and weightbearing, which limited ambulation distance and standing tolerance. Pain in left leg decreased to 0/10 throughout PT session.      Rehab potential is fair.    Activity tolerance: Fair    Discharge recommendations: Discharge Facility/Level Of Care Needs: (TBD)     Equipment recommendations: Equipment Needed After Discharge: (TBD)     GOALS:   Multidisciplinary Problems     Physical Therapy Goals        Problem: Physical Therapy Goal    Goal Priority Disciplines Outcome Goal Variances Interventions   Physical Therapy Goal     PT, PT/OT Ongoing (interventions implemented as appropriate)     Description:  Goals to be met by: 2018     Patient will increase functional independence with mobility by performin. Supine to sit with Minimum Assistance  2. Sit to supine with Minimum Assistance  3. Rolling to Left and Right with Minimum Assistance  4. Sit to stand transfer with Minimal Assistance  5. Bed to chair transfer with Minimal Assistance using Rolling Walker  6. Gait  x 150 feet with Minimal Assistance using Rolling Walker.   7. Wheelchair propulsion x 200 feet with Stand-by Assistance using right upper and lower extremities.  8. Ascend/descend 4 steps with bilateral  Handrails Minimal Assistance.                    PLAN:    Patient to be seen daily  to address the above listed problems via gait training, therapeutic exercises, therapeutic activities, neuromuscular re-education, wheelchair management/training  Plan of Care expires: 12/07/18  Plan of Care reviewed with: patient     I certify that I was present in the room directing the student, Sugey Swift, in service delivery and guiding them using my skilled judgment. As the co-signing therapist I have reviewed the students documentation and am responsible for the treatment, assessment, and plan.     Sugey Swift, SPT 11/12/2018

## 2018-11-12 NOTE — PROGRESS NOTES
"REHAB FOLLOWUP NOTE    Dennis Patricio is a 68 y.o. female patient.    Chief Complaint:  Right occipital parietal acute ischemic infarct with left hemiparesis.    History:  68-year-old female initially presented to emergency department with complaints of blurred vision, left-sided weakness, unsteady gait, confusion that began 2 days prior to this hospitalization.  Patient was initially seen in the emergency room 2 days prior, noted to have elevated blood pressures and was discharged home with medications.  Patient was readmitted to hospital this time with  inability to get up and walk to the bathroom, felt she could not move her left leg and walk,patient was also unable to  objects with the left hand and felt heavy and numb.  Patient had a CT scan consistent with right posterior acute ischemic infarct, MRI consistent with  right occipital parietal ischemic infarct, carotids without any significant stenosis, echocardiogram with no thrombus or right to left shunt noted.  Patient has been medically stabilized and is transferred down to us for further rehabilitation.        Past Medical History:   Diagnosis Date    Arthritis     Back pain, chronic     Diabetes mellitus     New in 2018    Hypertension        Temp: 97.8 °F (36.6 °C) (11/12/18 0455)  Pulse: 82 (11/12/18 0455)  Resp: 18 (11/12/18 0455)  BP: 136/68 (11/12/18 0455)  SpO2: 99 % (11/12/18 0455)  Weight: 80.2 kg (176 lb 11.2 oz) (11/09/18 1645)  Height: 5' 8" (172.7 cm) (11/09/18 1645)    Lab Results   Component Value Date    WBC 9.70 11/12/2018    HGB 11.9 (L) 11/12/2018    HCT 35.4 (L) 11/12/2018     (H) 11/12/2018    ALT 17 11/12/2018    AST 34 11/12/2018     (L) 11/12/2018     (L) 11/12/2018    K 4.9 11/12/2018    K 4.9 11/12/2018     11/12/2018     11/12/2018    CREATININE 2.6 (H) 11/12/2018    CREATININE 2.6 (H) 11/12/2018    BUN 59 (H) 11/12/2018    BUN 59 (H) 11/12/2018    CO2 22 (L) 11/12/2018    CO2 22 (L) " 11/12/2018       Physical Examination:  Alert, oriented x3, complaints of left ft pain.  Lungs are clear to auscultation.  Heart with regular rate and rhythm.  Abdomen is soft, nontender, bowel sounds noted.  Bilateral lower extremities without any edema or calf tenderness noted.  Left upper and lower extremity with decreased active to passive range of motion decreased motor strength.  Painful left ankle range of motion.  Patient with urinary retention with Watkins catheter in place.  Acute renal failure resolving.  Labs reviewed.  Discussed with , would defers ultrasound of the kidneys for now.    Impression:  Status post new onset right occipital parietal acute ischemic infarct with left hemiparesis.  Acute renal failure secondary to urinary retention.  Hypertension.  Diabetes mellitus.  Degenerative joint disease.      Recommendations:  Continue current PT, OT, speech therapy and nursing care.          Dominique Brink MD  11/12/2018

## 2018-11-12 NOTE — PROGRESS NOTES
Progress note  Nephrology    Consult Requested By: Dominique Brink MD    Reason for Consult: RUT    SUBJECTIVE:     History of Present Illness:  68 e/o female pt, admitted to rehab unit 11/9 s/p CVA.  Initial labs show BUN/Scr 69/5.4, higher today 86/5.8.  Renal is consulted for comanagement.       11/12  Patient feels good.  No nausea, chest pain, sob.  Appetite is good.    Assessment/plan:    1.  RUT-- better.  Continue iv fluids through today.  Continued avoidance of nonessential nephrotoxic agents  2.  Hyponatremia--change IVF to NS.    3.  DM 2--blood glucose control per primary team  4.  HTN--Can continue amlodipine, continue holding losartan for RUT.    Past Medical History:   Diagnosis Date    Arthritis     Back pain, chronic     Diabetes mellitus     New in 2018    Hypertension      History reviewed. No pertinent surgical history.  History reviewed. No pertinent family history.  Social History     Tobacco Use    Smoking status: Former Smoker     Packs/day: 0.00   Substance Use Topics    Alcohol use: No    Drug use: No       Review of patient's allergies indicates:  No Known Allergies     Review of Systems:  General ROS: negative for - fever or night sweats  Psychological ROS: negative for - behavioral disorder or depression  ENT ROS: negative for - headaches or visual changes  Hematological and Lymphatic ROS: negative for - bleeding problems or bruising  Endocrine ROS: negative for - temperature intolerance or unexpected weight changes  Respiratory ROS: no cough, shortness of breath, or wheezing  Cardiovascular ROS: no chest pain or dyspnea on exertion  Gastrointestinal ROS: no abdominal pain, change in bowel habits, or black or bloody stools  Genito-Urinary ROS: no dysuria, trouble voiding, or hematuria  Musculoskeletal ROS: negative for - joint pain or joint swelling  Dermatological ROS: negative for rash and skin lesion changes    OBJECTIVE:     Vital Signs Range (Last 24H):  Temp:  [97.8 °F  (36.6 °C)-98.6 °F (37 °C)]   Pulse:  [82-87]   Resp:  [18]   BP: (136-148)/(68-77)   SpO2:  [98 %-99 %]     Physical Exam:  General- Patient alert and oriented x3 in NAD  HEENT- WNL  Neck- supple  CV- Regular rate and rhythm  Resp-  No increased WOB  GI- Non tender/non-distended,  Extrem- No cyanosis, clubbing, edema.  Derm- No rashes, masses, or lesions noted  Neuro-  No flap.     Body mass index is 26.87 kg/m².    Laboratory:  CBC:   Recent Labs   Lab 11/12/18  0610   WBC 9.70   RBC 4.18   HGB 11.9*   HCT 35.4*   *   MCV 85   MCH 28.5   MCHC 33.6     CMP:   Recent Labs   Lab 11/12/18  0610   *  159*   CALCIUM 9.2  9.2   ALBUMIN 2.7*  2.7*   PROT 6.8   *  134*   K 4.9  4.9   CO2 22*  22*     102   BUN 59*  59*   CREATININE 2.6*  2.6*   ALKPHOS 74   ALT 17   AST 34   BILITOT 0.4       Diagnostic Results:  Labs: Reviewed      ASSESSMENT/PLAN:     Active Hospital Problems    Diagnosis  POA    CVA (cerebral vascular accident) [I63.9]  Yes      Resolved Hospital Problems   No resolved problems to display.         Thank you for allowing us to participate in the care of your patient. We will follow the patient and provide recommendations as needed.      Time spent seeing patient( greater than 1/2 spent in direct contact) :       Scott Felix MD  Nephrology  [facility]  [phone]

## 2018-11-12 NOTE — PLAN OF CARE
Problem: Physical Therapy Goal  Goal: Physical Therapy Goal  Goals to be met by: 2018     Patient will increase functional independence with mobility by performin. Supine to sit with Minimum Assistance  2. Sit to supine with Minimum Assistance  3. Rolling to Left and Right with Minimum Assistance  4. Sit to stand transfer with Minimal Assistance  5. Bed to chair transfer with Minimal Assistance using Rolling Walker  6. Gait  x 150 feet with Minimal Assistance using Rolling Walker.   7. Wheelchair propulsion x 200 feet with Stand-by Assistance using right upper and lower extremities.  8. Ascend/descend 4 steps with bilateral Handrails Minimal Assistance.   Outcome: Ongoing (interventions implemented as appropriate)  PT for gait training, w/c mobility, therapeutic activity and neuro re-ed

## 2018-11-12 NOTE — PT/OT/SLP PROGRESS
"  Occupational Therapy  Treatment    Dennis Patricio   MRN: [id number]   Admitting Diagnosis:New onset right occipital parietal acute ischemic infarct with left hemiparesis.     OT Date of Treatment: 11/12/18   Total Time (min): 100 min    Billable Minutes:  Self Care/Home Management 75 and Neuromuscular Re-education 25  Total Minutes: 100    General Precautions: Standard, diabetic, fall, vision impaired  Orthopedic Precautions: N/A  Braces: N/A    Do you have any cultural, spiritual, Yazidism conflicts, given your current situation?: none    Subjective:  Communicated with nurse prior to session.    Pain Rating 1: 10/10  Location - Side 1: Left  Location - Orientation 1: generalized  Location 1: foot(and lower part of lower leg.)  Pain Addressed 1: Cessation of Activity, Reposition, Distraction  Pain Rating Post-Intervention 1: 5/10(Pt reported pain in the LLE lower leg and foot  when it was being touched or moved .  She reported it decreased to tolerable when not touched or moved.  "It will be OK when we stop and it is still.")    Objective:  Patient found with: bed alarm, peripheral IV    Functional Mobility:  Bed Mobility:   MaxA supported long sitting to sitting on the side of the bed.2    Transfer Training:   Bed>w/c and w/c<>TTB with cutout with maxA of one and ModA of second person for a squat pivot transfer .  ModA x1 for sit<>stand with use of wall bar or bed rail for standing for clothing management.    Bathing:  MaxA.  Pt required maxA due to having had a bowel movement during transfer out of bed, in chair and on TTB.     UE Dressing:  ModA    LE Dressing:  Dependent with assist of 2 persons.  One to assist pt in standing and one to manage clothing.      Toilet Training:  Pt incontinent today of bowel and bladder due to loose stools.    Balance:   Static Sit: :   Supervision to Vik  Dynamic Sit: CGA  Static Stand: Max/ModA  Dynamic stand: Pt unable to release RUE in standing today.    Additional " Treatment:  Pt. was provided a long handle bath sponge and instructed for use in bathing and drying below the knees with use of the RUE.  OTR attempted to have pt assist in bathing with use of the LUE, but pt was unable to hold cloth or maintain L hand in correct contact with body parts for bathing.  OTR instructed pt in weight bearing to the LUE during bathing and donning pants, but she was unable to maintain the UE in wt bearing position.  Max A required of OTR.  TTB with cutout used due to bowel incontinence.  Pt required verbal instructions to sequence through steps of bathing and drying and dressing.      Patient left up in chair with call button in reach, chair alarm on and nurse notified.    ASSESSMENT:  Dennis Patricio experienced loose bowel stools during today's treatment session.  She was unable to stand to transfer due to reported severe pain of the LLE foot and lower leg.  She had to stop and rest a few times due to pain in the LE when it moved and touched something.  She was able to move the extremity very minimally actively during performance of self care tasks due to the pain.  It is felt that her limitations today were partly due to loose bowel movements today and resultant fatigue and LLE lower leg and foot pain.    Rehab potential is good    Activity tolerance: good to fair.    Discharge recommendations: home( vrs OP.)     Equipment recommendations: (TBD)     GOALS:   Multidisciplinary Problems     Occupational Therapy Goals        Problem: Occupational Therapy Goal    Goal Priority Disciplines Outcome Interventions   Occupational Therapy Goal     OT, PT/OT Ongoing (interventions implemented as appropriate)    Description:  Goals to be met by:     Patient will increase functional independence with ADLs by performing:    Feeding with Modified [place].  UE Dressing with Set-up Assistance.  LE Dressing with Stand-by Assistance.  Grooming while seated with Set-up Assistance.   Shower transfer  to TTB or shower chair with Beth and wall bar.  Bathing from  shower chair/bench with Minimal Assistance.  Toileting from bedside commode over toilet with Minimal Assistance for hygiene and clothing management.  Toilet transfer to bedside commode over toilet with Contact Guard Assistance.   Sitting at edge of bed x15 minutes with Stand-by Assistance while participating in ADL or exercise.                    Plan:  Patient to be seen 6 x/week to address the above listed problems via self-care/home management, community/work re-entry, therapeutic activities, therapeutic exercises, therapeutic groups, wheelchair management/training  Plan of Care expires:    Plan of Care reviewed with: patient         Caterina Yosef, OT  11/12/2018

## 2018-11-13 LAB
ALBUMIN SERPL BCP-MCNC: 2.9 G/DL
ANION GAP SERPL CALC-SCNC: 9 MMOL/L
BASOPHILS # BLD AUTO: 0 K/UL
BASOPHILS NFR BLD: 0.1 %
BUN SERPL-MCNC: 21 MG/DL
CALCIUM SERPL-MCNC: 9.8 MG/DL
CHLORIDE SERPL-SCNC: 103 MMOL/L
CO2 SERPL-SCNC: 25 MMOL/L
CREAT SERPL-MCNC: 1 MG/DL
DIFFERENTIAL METHOD: ABNORMAL
EOSINOPHIL # BLD AUTO: 0.2 K/UL
EOSINOPHIL NFR BLD: 2.4 %
ERYTHROCYTE [DISTWIDTH] IN BLOOD BY AUTOMATED COUNT: 13 %
EST. GFR  (AFRICAN AMERICAN): >60 ML/MIN/1.73 M^2
EST. GFR  (NON AFRICAN AMERICAN): 58 ML/MIN/1.73 M^2
GLUCOSE SERPL-MCNC: 265 MG/DL
HCT VFR BLD AUTO: 37.9 %
HGB BLD-MCNC: 12.5 G/DL
LYMPHOCYTES # BLD AUTO: 2 K/UL
LYMPHOCYTES NFR BLD: 20 %
MCH RBC QN AUTO: 28.5 PG
MCHC RBC AUTO-ENTMCNC: 33 G/DL
MCV RBC AUTO: 87 FL
MONOCYTES # BLD AUTO: 1.5 K/UL
MONOCYTES NFR BLD: 14.8 %
NEUTROPHILS # BLD AUTO: 6.3 K/UL
NEUTROPHILS NFR BLD: 62.7 %
PHOSPHATE SERPL-MCNC: 1.8 MG/DL
PLATELET # BLD AUTO: 440 K/UL
PMV BLD AUTO: 7.9 FL
POCT GLUCOSE: 184 MG/DL (ref 70–110)
POCT GLUCOSE: 241 MG/DL (ref 70–110)
POTASSIUM SERPL-SCNC: 4.8 MMOL/L
RBC # BLD AUTO: 4.38 M/UL
SODIUM SERPL-SCNC: 137 MMOL/L
WBC # BLD AUTO: 10.1 K/UL

## 2018-11-13 PROCEDURE — 85025 COMPLETE CBC W/AUTO DIFF WBC: CPT

## 2018-11-13 PROCEDURE — 97116 GAIT TRAINING THERAPY: CPT

## 2018-11-13 PROCEDURE — 12800000 HC REHAB SEMI-PRIVATE ROOM

## 2018-11-13 PROCEDURE — 63600175 PHARM REV CODE 636 W HCPCS: Performed by: PHYSICAL MEDICINE & REHABILITATION

## 2018-11-13 PROCEDURE — 36415 COLL VENOUS BLD VENIPUNCTURE: CPT

## 2018-11-13 PROCEDURE — 80069 RENAL FUNCTION PANEL: CPT

## 2018-11-13 PROCEDURE — 92507 TX SP LANG VOICE COMM INDIV: CPT

## 2018-11-13 PROCEDURE — 97112 NEUROMUSCULAR REEDUCATION: CPT

## 2018-11-13 PROCEDURE — 25000003 PHARM REV CODE 250: Performed by: PHYSICAL MEDICINE & REHABILITATION

## 2018-11-13 PROCEDURE — 97530 THERAPEUTIC ACTIVITIES: CPT

## 2018-11-13 PROCEDURE — 97127 HC THERAPEUTIC INTVTN, COGN FUNCTION - ST: CPT

## 2018-11-13 PROCEDURE — 97542 WHEELCHAIR MNGMENT TRAINING: CPT

## 2018-11-13 RX ORDER — METFORMIN HYDROCHLORIDE 500 MG/1
500 TABLET, EXTENDED RELEASE ORAL
Status: DISCONTINUED | OUTPATIENT
Start: 2018-11-13 | End: 2018-11-14

## 2018-11-13 RX ORDER — ENOXAPARIN SODIUM 100 MG/ML
40 INJECTION SUBCUTANEOUS EVERY 24 HOURS
Status: DISCONTINUED | OUTPATIENT
Start: 2018-11-13 | End: 2018-11-29 | Stop reason: HOSPADM

## 2018-11-13 RX ADMIN — POLYETHYLENE GLYCOL 3350 17 G: 17 POWDER, FOR SOLUTION ORAL at 09:11

## 2018-11-13 RX ADMIN — ATORVASTATIN CALCIUM 80 MG: 40 TABLET, FILM COATED ORAL at 08:11

## 2018-11-13 RX ADMIN — CLOPIDOGREL BISULFATE 75 MG: 75 TABLET ORAL at 09:11

## 2018-11-13 RX ADMIN — INSULIN ASPART 2 UNITS: 100 INJECTION, SOLUTION INTRAVENOUS; SUBCUTANEOUS at 04:11

## 2018-11-13 RX ADMIN — METFORMIN HYDROCHLORIDE 500 MG: 500 TABLET, EXTENDED RELEASE ORAL at 04:11

## 2018-11-13 RX ADMIN — AMLODIPINE BESYLATE 10 MG: 5 TABLET ORAL at 09:11

## 2018-11-13 RX ADMIN — ASPIRIN 81 MG CHEWABLE TABLET 81 MG: 81 TABLET CHEWABLE at 09:11

## 2018-11-13 RX ADMIN — TRAMADOL HYDROCHLORIDE 50 MG: 50 TABLET, FILM COATED ORAL at 08:11

## 2018-11-13 RX ADMIN — ENOXAPARIN SODIUM 40 MG: 100 INJECTION SUBCUTANEOUS at 04:11

## 2018-11-13 NOTE — PLAN OF CARE
Problem: Patient Care Overview  Goal: Plan of Care Review  Outcome: Ongoing (interventions implemented as appropriate)  Pt alert and oriented. Has de la cruz cath. Cont bowel. Mod assist with transfers. Plan of care continued. Call light in reach

## 2018-11-13 NOTE — PLAN OF CARE
Problem: SLP Goal  Goal: SLP Goal  1.  Patient will name common objects with SB assist  2.  Patient will complete visuospatial tasks with MOD assist  3.  Patient will recall 3/3 related objects with MOD assist for use of short term memory strategies  4.  Patient will require MIN assist to name 10 objects in a simple category  5.  Patient will perform simple/moderate problem solving with MIN assist   Outcome: Ongoing (interventions implemented as appropriate)  Continue POC

## 2018-11-13 NOTE — PROGRESS NOTES
Pharmacist Renal Dose Adjustment Note    The enoxaparin dose has been adjusted for Dennis Patricio [id number] according to the pharmacy practice protocol.      Based on the patient's Estimated Creatinine Clearance: 60.7 mL/min (based on SCr of 1 mg/dL)., Body mass index is 26.87 kg/m²., and weight= 80.2 kg (176 lb 11.2 oz), the enoxaparin dose has been adjusted 40 mg every 24 hours for CrCl =/>30.    Thank you,  Henry Malhotra, PharmD

## 2018-11-13 NOTE — PT/OT/SLP PROGRESS
Physical Therapy         Treatment        Dennis Patricio   MRN: [id number]     PT Received On: 11/13/18  Total Time (min): 75        Billable Minutes:  Gait [id number], Therapeutic Activity 15, Neuromuscular Re-education 35 and Train/Wheelchair Management 15  Total Minutes: 75    Treatment Type: Treatment  PT/PTA: PT(with SPT)     PTA Visit Number: 0       General Precautions: Standard, fall, vision impaired  Orthopedic Precautions: Orthopedic Precautions : N/A   Braces: Braces: N/A       Subjective:  Communicated with OTCaterina,  prior to session.    Pain/Comfort  Pain Rating 1: 0/10    Objective:  Patient found seated in wheelchair in rehab gym. Patient found with: peripheral IV    Functional Mobility:    Balance:   Static Stand: POOR+: Needs MINIMAL assist to maintain  Dynamic stand: POOR: Needs MOD (moderate) assist during gait    Transfer Training:  Sit to stand:Maximum Assistance with parallel bars x 4 trials with L leg blocked for safety and verbal cues to weight shift onto left leg.    Wheelchair Training:  Pt propelled Standard wheelchair x 125 feet on Level tile with  Right upper extremity and Right lower extremity with Maximum Assistance.     Gait Training:  Patient gait trained FWB/WBAT: bilateral lower extremity 2 trials of 8 feet on level tile with Parallel bars with Maximum Assistance with left lower extremity blocked for safety and left upper extremity. Pt's L UE noted to have increased tone pulling into extensor synergy especially at her wrist while standing.    Additional Treatment:  Standing balance in // bars in front of mirror on level surface with minimum assistance and with moderate assistance on foam in order to assist pt to find midline in standing.     Sci Fit x 5 minutes to improve exercise tolerance.     Seated neuromuscular education for L LE:  Quad sets, Ankle Pumps, LAQ, and Hip Flexion, Hip abd with yellow theraband, hip adduction against a ball, and hamstring curls with yellow  theraband 3 x 10      Activity Tolerance:  Patient tolerated treatment well and Patient limited by fatigue    Patient left up in chair with call button in reach, chair alarm on and a family member present.    Assessment:  Dennis Patricio is a 68 y.o. female with a medical diagnosis of new onset right occipital parietal acute ischemic infarct with left hemiparesis.   She presents with decreased strength, decreased functional activity tolerance, impaired balance, and absent sensation to light touch on left upper and lower extremities. Pt to benefit from skilled PT rehab services to address functional impairments. Pt's L UE noted to have less tone pulling into extensor synergy compared to yesterdays PT session while standing. Pt tolerated weight bearing in L LE with out pain during gait training in // bars.     Rehab potential is fair.    Activity tolerance: Fair    Discharge recommendations: Discharge Facility/Level Of Care Needs: (TBD)     Equipment recommendations: Equipment Needed After Discharge: (TBD)     GOALS:   Multidisciplinary Problems     Physical Therapy Goals        Problem: Physical Therapy Goal    Goal Priority Disciplines Outcome Goal Variances Interventions   Physical Therapy Goal     PT, PT/OT Ongoing (interventions implemented as appropriate)     Description:  Goals to be met by: 2018     Patient will increase functional independence with mobility by performin. Supine to sit with Minimum Assistance  2. Sit to supine with Minimum Assistance  3. Rolling to Left and Right with Minimum Assistance  4. Sit to stand transfer with Minimal Assistance  5. Bed to chair transfer with Minimal Assistance using Rolling Walker  6. Gait  x 150 feet with Minimal Assistance using Rolling Walker.   7. Wheelchair propulsion x 200 feet with Stand-by Assistance using right upper and lower extremities.  8. Ascend/descend 4 steps with bilateral Handrails Minimal Assistance.                    PLAN:    Patient  to be seen daily  to address the above listed problems via gait training, therapeutic activities, therapeutic exercises, neuromuscular re-education, wheelchair management/training, therapeutic groups  Plan of Care expires: 12/07/18  Plan of Care reviewed with: patient     I certify that I was present in the room directing the student, Sugey Swift, in service delivery and guiding them using my skilled judgment. As the co-signing therapist I have reviewed the students documentation and am responsible for the treatment, assessment, and plan.     Sugey Swift, SPT 11/13/2018

## 2018-11-13 NOTE — PT/OT/SLP PROGRESS
Occupational Therapy  Treatment    Dennis Patricio   MRN: [id number]   Admitting DiagnosisNew onset right occipital parietal acute ischemic infarct with left hemiparesis.   :  OT Date of Treatment: 11/13/18   Total Time (min): 75 min      Billable Minutes:  Neuromuscular Re-education 75  Total Minutes: 75      General Precautions: Standard, diabetic, fall  Orthopedic Precautions: N/A  Braces: N/A    Do you have any cultural, spiritual, Baptist conflicts, given your current situation?: none    Subjective:  Communicated with nurse prior to session.    Pain Rating 1: 0/10                   Objective:  Patient found with: peripheral IV(w/c alarm)    Additional Treatment:  Pt tx consisted of NME education including tasks facilitating visual attention to the left, Right and Left discrimination tasks and unilateral and bilateral UE gross motor tasks. She was engaged in performance of bilateral nirali with 8 pounds resistance for proprioceptive input to the LUE.  She demonstrated maximal difficulty maintaining a  with the left hand with the wrist moving into flexion and pt being unable to move the arms in synchrony forwards and backwards.  Pt instructed to break down into each step of performance with the LUE , picking up cones placed to left of midline and stacking them at midline.  Pt engaged in task of looking into mirror for self feedback and moving UEs into flexion and return to lap grading with hands first grasped together at midline, then palms and fingertips together and then hands next to each other without touching.    She was engaged in bilateral UE gross motor coordination exercises with use of a ball moving it in various directions with bilateral hold on the ball.    Patient left up in chair with call button in reach, chair alarm on and instructions to call nurse for any needs.    ASSESSMENT:  Dennis Patricio was inconsistent in R and L discrimination tasks with 80% accuracy today.  In use of mirror  was able to move the bilateral UEs in sync in the first 2 scenarios, and with moderate accuracy in the last scenario.  With use of the ball, pt was able to maintain contact with the ball with both hands, but with the L moving in various positions.  She was unable to maintain palm and digit contact. Pt required instruction to rotate head to the left to visually attend to items on the left.  Her performance improved when she was able to follow instruction to visually locate items to the left prior to reaching for them with the LUE/hand.    Rehab potential is good    Activity tolerance: Good    Discharge recommendations: home     Equipment recommendations: (TBD)     GOALS:   Multidisciplinary Problems     Occupational Therapy Goals        Problem: Occupational Therapy Goal    Goal Priority Disciplines Outcome Interventions   Occupational Therapy Goal     OT, PT/OT Ongoing (interventions implemented as appropriate)    Description:  Goals to be met by:     Patient will increase functional independence with ADLs by performing:    Feeding with Modified [place].  UE Dressing with Set-up Assistance.  LE Dressing with Stand-by Assistance.  Grooming while seated with Set-up Assistance.   Shower transfer to TTB or shower chair with Beth and wall bar.  Bathing from  shower chair/bench with Minimal Assistance.  Toileting from bedside commode over toilet with Minimal Assistance for hygiene and clothing management.  Toilet transfer to bedside commode over toilet with Contact Guard Assistance.   Sitting at edge of bed x15 minutes with Stand-by Assistance while participating in ADL or exercise.                    Plan:  Patient to be seen 6 x/week to address the above listed problems via self-care/home management, therapeutic activities, therapeutic exercises, therapeutic groups, neuromuscular re-education, cognitive retraining, wheelchair management/training  Plan of Care expires:    Plan of Care reviewed with: patient,  daughter         Caterina Navas, OT  11/13/2018

## 2018-11-13 NOTE — PLAN OF CARE
Problem: Patient Care Overview  Goal: Plan of Care Review  Outcome: Ongoing (interventions implemented as appropriate)  Patient awake/alert. Medicated x 1 this shift for pain. Free from falls. Plan of care continued

## 2018-11-13 NOTE — PROGRESS NOTES
"REHAB FOLLOWUP NOTE    Dennis Patricio is a 68 y.o. female patient.    Chief Complaint:  Right occipital parietal acute ischemic infarct with left hemiparesis.    History:  68-year-old female initially presented to emergency department with complaints of blurred vision, left-sided weakness, unsteady gait, confusion that began 2 days prior to this hospitalization.  Patient was initially seen in the emergency room 2 days prior, noted to have elevated blood pressures and was discharged home with medications.  Patient was readmitted to hospital this time with  inability to get up and walk to the bathroom, felt she could not move her left leg and walk,patient was also unable to  objects with the left hand and felt heavy and numb.  Patient had a CT scan consistent with right posterior acute ischemic infarct, MRI consistent with  right occipital parietal ischemic infarct, carotids without any significant stenosis, echocardiogram with no thrombus or right to left shunt noted.  Patient has been medically stabilized and is transferred down to us for further rehabilitation.        Past Medical History:   Diagnosis Date    Arthritis     Back pain, chronic     Diabetes mellitus     New in 2018    Hypertension        Temp: 98.1 °F (36.7 °C) (11/13/18 0813)  Pulse: 86 (11/13/18 0813)  Resp: 16 (11/13/18 0813)  BP: (!) 146/71 (11/13/18 0813)  SpO2: 98 % (11/13/18 0813)  Weight: 80.2 kg (176 lb 11.2 oz) (11/09/18 1645)  Height: 5' 8" (172.7 cm) (11/09/18 1645)    Lab Results   Component Value Date    WBC 9.70 11/12/2018    HGB 11.9 (L) 11/12/2018    HCT 35.4 (L) 11/12/2018     (H) 11/12/2018    ALT 17 11/12/2018    AST 34 11/12/2018     (L) 11/12/2018     (L) 11/12/2018    K 4.9 11/12/2018    K 4.9 11/12/2018     11/12/2018     11/12/2018    CREATININE 2.6 (H) 11/12/2018    CREATININE 2.6 (H) 11/12/2018    BUN 59 (H) 11/12/2018    BUN 59 (H) 11/12/2018    CO2 22 (L) 11/12/2018    CO2 22 " (L) 11/12/2018       Physical Examination:  Alert, oriented x3, voices no complaints.  Constipation resolved.  Lungs are clear to auscultation.  Heart with regular rate and rhythm.  Bilateral lower extremities without any edema or calf tenderness noted.  Left upper and lower extremity with decreased active to passive range of motion decreased motor strength.   Patient with urinary retention with Watkins catheter in place.  Acute renal failure resolving.  Check labs in a.m.    Impression:  Status post new onset right occipital parietal acute ischemic infarct with left hemiparesis.  Acute renal failure secondary to urinary retention.  Hypertension.  Diabetes mellitus.  Degenerative joint disease.      Recommendations:  Continue current PT, OT, speech therapy and nursing care.  Check labs in a.m..        Dominique Brink MD  11/13/2018

## 2018-11-13 NOTE — PLAN OF CARE
Problem: Occupational Therapy Goal  Goal: Occupational Therapy Goal  Goals to be met by:     Patient will increase functional independence with ADLs by performing:    Feeding with Modified [place].  UE Dressing with Set-up Assistance.  LE Dressing with Stand-by Assistance.  Grooming while seated with Set-up Assistance.   Shower transfer to TTB or shower chair with Beth and wall bar.  Bathing from  shower chair/bench with Minimal Assistance.  Toileting from bedside commode over toilet with Minimal Assistance for hygiene and clothing management.  Toilet transfer to bedside commode over toilet with Contact Guard Assistance.   Sitting at edge of bed x15 minutes with Stand-by Assistance while participating in ADL or exercise.   Outcome: Ongoing (interventions implemented as appropriate)  OT tx for NME.

## 2018-11-13 NOTE — PLAN OF CARE
Problem: Physical Therapy Goal  Goal: Physical Therapy Goal  Goals to be met by: 2018     Patient will increase functional independence with mobility by performin. Supine to sit with Minimum Assistance  2. Sit to supine with Minimum Assistance  3. Rolling to Left and Right with Minimum Assistance  4. Sit to stand transfer with Minimal Assistance  5. Bed to chair transfer with Minimal Assistance using Rolling Walker  6. Gait  x 150 feet with Minimal Assistance using Rolling Walker.   7. Wheelchair propulsion x 200 feet with Stand-by Assistance using right upper and lower extremities.  8. Ascend/descend 4 steps with bilateral Handrails Minimal Assistance.   Outcome: Ongoing (interventions implemented as appropriate)  PT for gait training, therapeutic activity, and neuromuscular re-education.

## 2018-11-14 LAB
ANION GAP SERPL CALC-SCNC: 6 MMOL/L
BUN SERPL-MCNC: 14 MG/DL
CALCIUM SERPL-MCNC: 9.8 MG/DL
CHLORIDE SERPL-SCNC: 101 MMOL/L
CO2 SERPL-SCNC: 29 MMOL/L
CREAT SERPL-MCNC: 0.8 MG/DL
EST. GFR  (AFRICAN AMERICAN): >60 ML/MIN/1.73 M^2
EST. GFR  (NON AFRICAN AMERICAN): >60 ML/MIN/1.73 M^2
GLUCOSE SERPL-MCNC: 174 MG/DL
POCT GLUCOSE: 152 MG/DL (ref 70–110)
POCT GLUCOSE: 244 MG/DL (ref 70–110)
POTASSIUM SERPL-SCNC: 4.6 MMOL/L
SODIUM SERPL-SCNC: 136 MMOL/L

## 2018-11-14 PROCEDURE — 92507 TX SP LANG VOICE COMM INDIV: CPT

## 2018-11-14 PROCEDURE — 63600175 PHARM REV CODE 636 W HCPCS: Performed by: PHYSICAL MEDICINE & REHABILITATION

## 2018-11-14 PROCEDURE — 25000003 PHARM REV CODE 250: Performed by: PHYSICAL MEDICINE & REHABILITATION

## 2018-11-14 PROCEDURE — 97530 THERAPEUTIC ACTIVITIES: CPT

## 2018-11-14 PROCEDURE — 97542 WHEELCHAIR MNGMENT TRAINING: CPT

## 2018-11-14 PROCEDURE — 12800000 HC REHAB SEMI-PRIVATE ROOM

## 2018-11-14 PROCEDURE — 36415 COLL VENOUS BLD VENIPUNCTURE: CPT

## 2018-11-14 PROCEDURE — 97127 HC THERAPEUTIC INTVTN, COGN FUNCTION - ST: CPT

## 2018-11-14 PROCEDURE — 97112 NEUROMUSCULAR REEDUCATION: CPT

## 2018-11-14 PROCEDURE — 97116 GAIT TRAINING THERAPY: CPT

## 2018-11-14 PROCEDURE — 80048 BASIC METABOLIC PNL TOTAL CA: CPT

## 2018-11-14 PROCEDURE — 97535 SELF CARE MNGMENT TRAINING: CPT

## 2018-11-14 RX ORDER — LISINOPRIL 2.5 MG/1
5 TABLET ORAL DAILY
Status: DISCONTINUED | OUTPATIENT
Start: 2018-11-14 | End: 2018-11-14

## 2018-11-14 RX ORDER — LISINOPRIL 2.5 MG/1
5 TABLET ORAL NIGHTLY
Status: DISCONTINUED | OUTPATIENT
Start: 2018-11-14 | End: 2018-11-15

## 2018-11-14 RX ORDER — METFORMIN HYDROCHLORIDE 500 MG/1
1000 TABLET, EXTENDED RELEASE ORAL
Status: DISCONTINUED | OUTPATIENT
Start: 2018-11-14 | End: 2018-11-29 | Stop reason: HOSPADM

## 2018-11-14 RX ADMIN — CLOPIDOGREL BISULFATE 75 MG: 75 TABLET ORAL at 09:11

## 2018-11-14 RX ADMIN — ASPIRIN 81 MG CHEWABLE TABLET 81 MG: 81 TABLET CHEWABLE at 09:11

## 2018-11-14 RX ADMIN — METFORMIN HYDROCHLORIDE 1000 MG: 500 TABLET, EXTENDED RELEASE ORAL at 06:11

## 2018-11-14 RX ADMIN — ENOXAPARIN SODIUM 40 MG: 100 INJECTION SUBCUTANEOUS at 06:11

## 2018-11-14 RX ADMIN — INSULIN ASPART 2 UNITS: 100 INJECTION, SOLUTION INTRAVENOUS; SUBCUTANEOUS at 06:11

## 2018-11-14 RX ADMIN — LISINOPRIL 5 MG: 2.5 TABLET ORAL at 08:11

## 2018-11-14 RX ADMIN — AMLODIPINE BESYLATE 10 MG: 5 TABLET ORAL at 09:11

## 2018-11-14 RX ADMIN — POLYETHYLENE GLYCOL 3350 17 G: 17 POWDER, FOR SOLUTION ORAL at 09:11

## 2018-11-14 RX ADMIN — ATORVASTATIN CALCIUM 80 MG: 40 TABLET, FILM COATED ORAL at 08:11

## 2018-11-14 NOTE — PT/OT/SLP PROGRESS
Occupational Therapy  Treatment    Dennis Patricio   MRN: [id number]   Admitting Diagnosis: New onset right occipital parietal acute ischemic infarct with left hemiparesis.     OT Date of Treatment: 11/14/18   Total Time (min): 85 min      Billable Minutes:  Self Care/Home Management 85  Total Minutes: 85    General Precautions: Standard, diabetic, fall, vision impaired  Orthopedic Precautions: N/A  Braces: N/A    Do you have any cultural, spiritual, Restorationist conflicts, given your current situation?: none    Subjective:  Communicated with nurse prior to session.  Pt's sister stated she thought pt was being discharged tomorrow.  Initial staffing, treatment and discharge planning was explained to pt and her sister. They were advised that family training would occur prior to transfer.   Pt's sister stated she hopes pt is discharged in time for Thanksgiving Day.      Pain Rating 1: 0/10                   Objective:  Patient found with: de la cruz catheter    Functional Mobility:  Transfer Training:  W/C<>toilet with MaxA and verbal instructions and OTR stabilizing the LUE  W/C<>TTB with MaxA of 1 and CGA of second person.    Bathing:  ModA with wall bar, extended shower hose, TTB with cutout and long handle sponge.    UE Dressing:  ModA    LE Dressing:  MaxA    Toilet Training:  MaxA    Balance:   Static Sit: Supervision in w/c and close SBA to CGA on TTB and toilet.  Dynamic Sit:  CGA to maxA on TTB and toilet  Static Stand: MaxA/ModA  Dynamic stand: MaxA        Additional Treatment:  Pt requires step by step instruction for performance of all tasks for safe sequence of performance.  Pt requires ongoing assist with stabilization of the LUE during transfers for safety.  Pt instructed in techniques of incorporating use of the LUE in bathing without use of a wash cloth with visual attention to and slowed point A to point B movements.  She was instructed to move the RUE also in slower movement patterns to increase control  and decrease associated reactions of the LUE.  Pt instructed in safe sit<>stand sequences for body position and hand placement sequence.  Pt's sister present and education to sister and pt provided regarding left inattention, need to work at midline orientation for tasks such as washing her hands and applying lotion, and need for pt to perform tasks as independently as possible but within safety parameters.        Patient left up in chair with call button in reach, chair alarm on and her sister present.    ASSESSMENT:  Dennis Patricio reported no left foot pain today during session.  She demonstrated improvement in performance of bathroom transfers and was able to participate in stand pivot transfer to TTB vrs squat pivot required last time for safety. Two persons however required for safety.   She improved in bathing from MaxA to ModA level and LB dressing needing Assist x 2 to maxA    Rehab potential is good    Activity tolerance: Good    Discharge recommendations: home     Equipment recommendations: (TBD)     GOALS:   Multidisciplinary Problems     Occupational Therapy Goals        Problem: Occupational Therapy Goal    Goal Priority Disciplines Outcome Interventions   Occupational Therapy Goal     OT, PT/OT Ongoing (interventions implemented as appropriate)    Description:  Goals to be met by:     Patient will increase functional independence with ADLs by performing:    Feeding with Modified [place].  UE Dressing with Set-up Assistance.  LE Dressing with Stand-by Assistance.  Grooming while seated with Set-up Assistance.   Shower transfer to TTB or shower chair with Beth and wall bar.  Bathing from  shower chair/bench with Minimal Assistance.  Toileting from bedside commode over toilet with Minimal Assistance for hygiene and clothing management.  Toilet transfer to bedside commode over toilet with Contact Guard Assistance.   Sitting at edge of bed x15 minutes with Stand-by Assistance while participating in  ADL or exercise.                    Plan:  Patient to be seen 6 x/week to address the above listed problems via self-care/home management, community/work re-entry, therapeutic activities, therapeutic exercises, therapeutic groups, neuromuscular re-education, cognitive retraining, sensory integration, wheelchair management/training  Plan of Care expires:    Plan of Care reviewed with: patient, sibling(Her sister)         Caterina Navas OT  11/14/2018

## 2018-11-14 NOTE — PT/OT/SLP PROGRESS
"Speech Language Pathology Treatment          Dennis Patricio   MRN: [id number]     SLP Treatment Date: 11/13/18  Speech Start Time: 1340     Speech Stop Time: 1415     Speech Total (min): 35 min       TREATMENT BILLABLE MINUTES:  Speech Therapy Individual 35 and Total Time 35    General Precautions: Standard, diabetic, fall  Current Respiratory Status: room air       Subjective:  Pt alert and cooperative  "Im gonna be honest. I'm not trying"     Objective:   Patient found upright in wheelchair w/ alarm and belt. 3 item mental manipulations completed with 50% acc. Increased accuracy was not achieved with repetition or cueing as Pt consistently provided semantically related items that were unrelated to task. Instructed Pt on grouping/categorization technique to enhance recall of items. She required MAX A to set up and was able to recall 6/7 items in'dly immediately however in disorganized manner. Throughout 4 trials, Pt unable to demonstrate adequate understanding of strategies or recall target items beyond a 1 min filled delay. Pt noted to again, provide semantically related items however unrelated to task.        Assessment:  Dennis Patricio is a 68 y.o. female with a SLP diagnosis of Cognitive-Linguistic Impairment.       Discharge recommendations: Discharge Facility/Level Of Care Needs: home     Goals:   Multidisciplinary Problems     SLP Goals        Problem: SLP Goal    Goal Priority Disciplines Outcome   SLP Goal     SLP Ongoing (interventions implemented as appropriate)   Description:  1.  Patient will name common objects with SB assist  2.  Patient will complete visuospatial tasks with MOD assist  3.  Patient will recall 3/3 related objects with MOD assist for use of short term memory strategies  4.  Patient will require MIN assist to name 10 objects in a simple category  5.  Patient will perform simple/moderate problem solving with MIN assist                     Plan:   Patient to be seen Therapy Frequency: " 5 x/week   Planned Interventions: Cognitive-Linguistic Therapy  Plan of Care Expires: 11/26/18  Plan of Care reviewed with: patient, daughter  SLP Follow-up?: Yes  SLP - Next Visit Date: 11/14/18           Vasile Cook CCC-SLP 11/14/2018

## 2018-11-14 NOTE — PLAN OF CARE
Problem: Occupational Therapy Goal  Goal: Occupational Therapy Goal  Goals to be met by:     Patient will increase functional independence with ADLs by performing:    Feeding with Modified [place].  UE Dressing with Set-up Assistance.  LE Dressing with Stand-by Assistance.  Grooming while seated with Set-up Assistance.   Shower transfer to TTB or shower chair with Beth and wall bar.  Bathing from  shower chair/bench with Minimal Assistance.  Toileting from bedside commode over toilet with Minimal Assistance for hygiene and clothing management.  Toilet transfer to bedside commode over toilet with Contact Guard Assistance.   Sitting at edge of bed x15 minutes with Stand-by Assistance while participating in ADL or exercise.   Outcome: Ongoing (interventions implemented as appropriate)  OT tx for self care, functional mobility and safety education and family education.

## 2018-11-14 NOTE — PROGRESS NOTES
"REHAB FOLLOWUP NOTE    Dennis Patricio is a 68 y.o. female patient.    Chief Complaint:  Right occipital parietal acute ischemic infarct with left hemiparesis.    History:  68-year-old female initially presented to emergency department with complaints of blurred vision, left-sided weakness, unsteady gait, confusion that began 2 days prior to this hospitalization.  Patient was initially seen in the emergency room 2 days prior, noted to have elevated blood pressures and was discharged home with medications.  Patient was readmitted to hospital this time with  inability to get up and walk to the bathroom, felt she could not move her left leg and walk,patient was also unable to  objects with the left hand and felt heavy and numb.  Patient had a CT scan consistent with right posterior acute ischemic infarct, MRI consistent with  right occipital parietal ischemic infarct, carotids without any significant stenosis, echocardiogram with no thrombus or right to left shunt noted.  Patient has been medically stabilized and is transferred down to us for further rehabilitation.        Past Medical History:   Diagnosis Date    Arthritis     Back pain, chronic     Diabetes mellitus     New in 2018    Hypertension        Temp: 96.9 °F (36.1 °C) (11/14/18 0506)  Pulse: 72 (11/14/18 0506)  Resp: 18 (11/14/18 0506)  BP: (!) 158/74 (11/14/18 0506)  SpO2: 98 % (11/14/18 0506)  Weight: 80.2 kg (176 lb 11.2 oz) (11/09/18 1645)  Height: 5' 8" (172.7 cm) (11/09/18 1645)    Lab Results   Component Value Date    WBC 10.10 11/13/2018    HGB 12.5 11/13/2018    HCT 37.9 11/13/2018     (H) 11/13/2018    ALT 17 11/12/2018    AST 34 11/12/2018     11/14/2018    K 4.6 11/14/2018     11/14/2018    CREATININE 0.8 11/14/2018    BUN 14 11/14/2018    CO2 29 11/14/2018       Physical Examination:  Alert, oriented x3, voices no complaints.    Lungs are clear to auscultation.  Heart with regular rate and rhythm.  Bilateral " lower extremities without any edema or calf tenderness noted.  Left upper and lower extremity with decreased active to passive range of motion decreased motor strength.   Patient with urinary retention with Watkins catheter in place.  Acute renal failure resolved.  Labs reviewed.  Blood pressure and blood sugars monitor and adjust medication.    Impression:  Status post new onset right occipital parietal acute ischemic infarct with left hemiparesis.  Acute renal failure secondary to urinary retention.  Hypertension.  Diabetes mellitus.  Degenerative joint disease.      Recommendations:  Continue current PT, OT, speech therapy and nursing care.  Will resume metformin and lisinopril.        Dominique Brink MD  11/14/2018

## 2018-11-14 NOTE — PLAN OF CARE
Problem: Physical Therapy Goal  Goal: Physical Therapy Goal  Goals to be met by: 2018     Patient will increase functional independence with mobility by performin. Supine to sit with Minimum Assistance  2. Sit to supine with Minimum Assistance  3. Rolling to Left and Right with Minimum Assistance  4. Sit to stand transfer with Minimal Assistance  5. Bed to chair transfer with Minimal Assistance using Rolling Walker  6. Gait  x 150 feet with Minimal Assistance using Rolling Walker.   7. Wheelchair propulsion x 200 feet with Stand-by Assistance using right upper and lower extremities.  8. Ascend/descend 4 steps with bilateral Handrails Minimal Assistance.   Outcome: Ongoing (interventions implemented as appropriate)  PT for gait training, wheelchair mobility, neuromuscular re-education and therapeutic activity.

## 2018-11-14 NOTE — PLAN OF CARE
Problem: Patient Care Overview  Goal: Plan of Care Review  Outcome: Ongoing (interventions implemented as appropriate)  Patient awake/alert. Medicated x 1 this shift for pain. Left sided weakness present. Free feom falls. Plan of care continued

## 2018-11-14 NOTE — PT/OT/SLP PROGRESS
Speech Language Pathology Treatment          Dennis Patricio   MRN: [id number]     SLP Treatment Date: 11/14/18  Speech Start Time: 1445     Speech Stop Time: 1515     Speech Total (min): 30 min       TREATMENT BILLABLE MINUTES:  Speech Therapy Individual 30 and Total Time 30    General Precautions: Standard, fall, vision impaired  Current Respiratory Status: room air       Subjective:  Pt alert and cooperative    Objective:   Patient found upright in wheelchair w/ alarm and belt. Pictured stimuli used to present various safety and judgement scenarios. Pt was able to ID problem in 6/10 opportunities and provide adequate solutions in 7/10 opportunities. Following brief review of grouping/categorization technique to enhance recall of items, Pt required MOD A to set up and MAX A to recall following 5 min filled delay. Cueing warranted for Pt to recall target group in order to provide 3/3 items.      Assessment:  Dennis Patricio is a 68 y.o. female with a SLP diagnosis of Cognitive-Linguistic Impairment.       Discharge recommendations: Discharge Facility/Level Of Care Needs: home     Goals:   Multidisciplinary Problems     SLP Goals        Problem: SLP Goal    Goal Priority Disciplines Outcome   SLP Goal     SLP Ongoing (interventions implemented as appropriate)   Description:  1.  Patient will name common objects with SB assist  2.  Patient will complete visuospatial tasks with MOD assist  3.  Patient will recall 3/3 related objects with MOD assist for use of short term memory strategies  4.  Patient will require MIN assist to name 10 objects in a simple category  5.  Patient will perform simple/moderate problem solving with MIN assist                     Plan:   Patient to be seen Therapy Frequency: 5 x/week   Planned Interventions: Cognitive-Linguistic Therapy  Plan of Care Expires: 11/26/18  Plan of Care reviewed with: patient, daughter  SLP Follow-up?: Yes  SLP - Next Visit Date: 11/15/18           Vasile Cook  CCC-SLP 11/15/2018

## 2018-11-14 NOTE — PT/OT/SLP PROGRESS
Physical Therapy         Treatment        Dennis Patricio   MRN: [id number]     PT Received On: 11/14/18  Total Time (min): 75        Billable Minutes:  Gait Training 10, Therapeutic Activity 15, Neuromuscular Re-education 35 and Train/Wheelchair Management 15  Total Minutes: 75    Treatment Type: Treatment  PT/PTA: PT(with SPT)     PTA Visit Number: 0       General Precautions: Standard, fall, vision impaired  Orthopedic Precautions: Orthopedic Precautions : N/A   Braces: Braces: N/A     Subjective:  Communicated with Rommel MALDONADO, prior to session.   Vitals taken by Rommel MALDONADO, prior to PT session:  SpO2: 94%  HR: 79 bpm  BP: 199/88 mmHg    Pain/Comfort  Pain Rating 1: 0/10  Objective:  Patient found seated in wheelchair, with Patient found with: de la cruz catheter    Transfer Training:  Sit to stand:Moderate Assistance with parallel bars with left knee blocked for safety     Wheelchair Training:  Pt propelled Standard wheelchair x 75 feet on Level tile with  Right upper extremity and Right lower extremity with Moderate Assistance for steering.     Gait Training:  Patient gait trained FWB/WBAT: bilateral lower extremity 8 feet and 6  feet on level tile with Parallel bars with Maximum Assistance for balance.      Additional Treatment:  Vitals:   BP: 155/82 mmHg    Sci Fit x 10 minutes to improve exercise tolerance.      Seated neuromuscular education for L LE:  2# weights bilaterally: Ankle Pumps, LAQ, and Hip Flexion, Hip abd with yellow theraband, hip adduction against a ball, and hamstring curls with yellow theraband 3 x 10     Activity Tolerance:  Patient tolerated treatment well and Patient limited by fatigue    Patient left up in chair with call button in reach and chair alarm on.    Assessment:  Dennis Patricio is a 68 y.o. female with a medical diagnosis ofnew onset right occipital parietal acute ischemic infarct with left hemiparesis. She presents with decreased strength, decreased functional activity tolerance,  impaired balance, and absent sensation to light touch on left upper and lower extremities. Pt exhibits inattention to her left side due to a left visual field cut. Pt to benefit from skilled PT rehab services to address functional impairments. Pt able to inconsistently detect light touch on left upper arm and left upper thigh. During gait training, pt leans posteriorly and is unable to advance her left arm along the parallel bar which impairs her balance. Pt continues to exhibit associative reactions between her left upper and lower limb.     Rehab potential is fair.    Activity tolerance: Fair    Discharge recommendations: Discharge Facility/Level Of Care Needs: (TBD)     Equipment recommendations: Equipment Needed After Discharge: (TBD)     GOALS:   Multidisciplinary Problems     Physical Therapy Goals        Problem: Physical Therapy Goal    Goal Priority Disciplines Outcome Goal Variances Interventions   Physical Therapy Goal     PT, PT/OT Ongoing (interventions implemented as appropriate)     Description:  Goals to be met by: 2018     Patient will increase functional independence with mobility by performin. Supine to sit with Minimum Assistance  2. Sit to supine with Minimum Assistance  3. Rolling to Left and Right with Minimum Assistance  4. Sit to stand transfer with Minimal Assistance  5. Bed to chair transfer with Minimal Assistance using Rolling Walker  6. Gait  x 150 feet with Minimal Assistance using Rolling Walker.   7. Wheelchair propulsion x 200 feet with Stand-by Assistance using right upper and lower extremities.  8. Ascend/descend 4 steps with bilateral Handrails Minimal Assistance.                    PLAN:    Patient to be seen daily  to address the above listed problems via gait training, therapeutic activities, therapeutic exercises, therapeutic groups, wheelchair management/training, neuromuscular re-education  Plan of Care expires: 18  Plan of Care reviewed with: patient        I certify that I was present in the room directing the student, Sugey Swift, in service delivery and guiding them using my skilled judgment. As the co-signing therapist I have reviewed the students documentation and am responsible for the treatment, assessment, and plan.     Sugey Swift, SPT 11/14/2018

## 2018-11-15 LAB — POCT GLUCOSE: 183 MG/DL (ref 70–110)

## 2018-11-15 PROCEDURE — 63600175 PHARM REV CODE 636 W HCPCS: Performed by: PHYSICAL MEDICINE & REHABILITATION

## 2018-11-15 PROCEDURE — 97116 GAIT TRAINING THERAPY: CPT

## 2018-11-15 PROCEDURE — 12800000 HC REHAB SEMI-PRIVATE ROOM

## 2018-11-15 PROCEDURE — 97542 WHEELCHAIR MNGMENT TRAINING: CPT

## 2018-11-15 PROCEDURE — 92507 TX SP LANG VOICE COMM INDIV: CPT

## 2018-11-15 PROCEDURE — 97127 HC THERAPEUTIC INTVTN, COGN FUNCTION - ST: CPT

## 2018-11-15 PROCEDURE — 97112 NEUROMUSCULAR REEDUCATION: CPT

## 2018-11-15 PROCEDURE — 25000003 PHARM REV CODE 250: Performed by: PHYSICAL MEDICINE & REHABILITATION

## 2018-11-15 PROCEDURE — 97802 MEDICAL NUTRITION INDIV IN: CPT

## 2018-11-15 PROCEDURE — 97530 THERAPEUTIC ACTIVITIES: CPT

## 2018-11-15 RX ORDER — LISINOPRIL 10 MG/1
10 TABLET ORAL NIGHTLY
Status: DISCONTINUED | OUTPATIENT
Start: 2018-11-15 | End: 2018-11-21

## 2018-11-15 RX ADMIN — CLOPIDOGREL BISULFATE 75 MG: 75 TABLET ORAL at 09:11

## 2018-11-15 RX ADMIN — ENOXAPARIN SODIUM 40 MG: 100 INJECTION SUBCUTANEOUS at 05:11

## 2018-11-15 RX ADMIN — ASPIRIN 81 MG CHEWABLE TABLET 81 MG: 81 TABLET CHEWABLE at 09:11

## 2018-11-15 RX ADMIN — AMLODIPINE BESYLATE 10 MG: 5 TABLET ORAL at 09:11

## 2018-11-15 RX ADMIN — POLYETHYLENE GLYCOL 3350 17 G: 17 POWDER, FOR SOLUTION ORAL at 09:11

## 2018-11-15 RX ADMIN — LISINOPRIL 10 MG: 10 TABLET ORAL at 09:11

## 2018-11-15 RX ADMIN — METFORMIN HYDROCHLORIDE 1000 MG: 500 TABLET, EXTENDED RELEASE ORAL at 05:11

## 2018-11-15 RX ADMIN — ATORVASTATIN CALCIUM 80 MG: 40 TABLET, FILM COATED ORAL at 09:11

## 2018-11-15 NOTE — PT/OT/SLP PROGRESS
Physical Therapy         Treatment        Dennis Patricio   MRN: [id number]     PT Received On: 11/15/18  Total Time (min): 75        Billable Minutes:  Gait Training 15, Therapeutic Activity 20, Neuromuscular Re-education 25 and Train/Wheelchair Management 15  Total Minutes: 75    Treatment Type: Treatment  PT/PTA: PT(with SPT)     PTA Visit Number: 0       General Precautions: Standard, fall, vision impaired  Orthopedic Precautions: Orthopedic Precautions : N/A   Braces: Braces: N/A     Subjective:  Pt agreeable to PT.     Pain/Comfort  Pain Rating 1: 0/10    Objective:  Patient found seated in wheelchair in pt's room. Patient found with: de la cruz catheter    Functional Mobility:  Bed Mobility:   Supine to sit: Minimal Assistance    Sit to supine: Minimal Assistance   Rolling:  Contact Guard Assistance with Minimal Assistance for  verbal cues    Scooting: Minimal Assistance    Transfer Training:  Sit to stand:Moderate Assistance with Rolling Walker with left hand support.  Bed <> Chair:  Step Transfer with Moderate Assistance with No Assistive Device and verbal cues for safety.    Wheelchair Training:  Pt propelled Standard wheelchair x 170 feet on Level tile with  Right upper extremity and Right lower extremity with Moderate Assistance for steering.     Gait Training:  Patient gait trained FWB/WBAT: bilateral lower extremity 31 feet and 43   feet on level tile with Rolling Walker and with Left hand support with Maximum Assistance and x 2 for safety.  Pt with demonstarting a  3-point gait with decreased weight-shifting ability.Impairments contributing to gait deviations include impaired balance, impaired coordination, decreased sensation and impaired sensory feedback    Additional Treatment:  Seated neuromuscular education for L LE:  2# weights bilaterally: Ankle Pumps, LAQ, and Hip Flexion, and glute squeezes; 3 x 10 each     Activity Tolerance:  Patient tolerated treatment well and Patient limited by  fatigue    Patient left up in chair with call button in reach and chair alarm on.    Assessment:  Dennis Patricio is a 68 y.o. female with a medical diagnosis ofnew onset right occipital parietal acute ischemic infarct with left hemiparesis. She presents with decreased strength, decreased functional activity tolerance, impaired balance, and absent sensation to light touch on left upper and lower extremities. Pt to benefit from skilled PT rehab services to address functional impairments. Pt continues to exhibit associative reactions between her left arm and left leg. Pt coordination with propulsion of wheelchair is improving, however, her vision impairment limits her ability to manage the wheelchair without running into objects. During gait training, pt was able to progress from // bars to a rolling walker with L UE support, however required max assist x 2 for safety due to impaired balance and decreased weight shifting ability.     Rehab potential is fair.    Activity tolerance: Fair    Discharge recommendations: Discharge Facility/Level Of Care Needs: home, outpatient PT     Equipment recommendations: Equipment Needed After Discharge: (TBD)     GOALS:   Multidisciplinary Problems     Physical Therapy Goals        Problem: Physical Therapy Goal    Goal Priority Disciplines Outcome Goal Variances Interventions   Physical Therapy Goal     PT, PT/OT Ongoing (interventions implemented as appropriate)     Description:  Goals to be met by: 2018     Patient will increase functional independence with mobility by performin. Supine to sit with Minimum Assistance  2. Sit to supine with Minimum Assistance  3. Rolling to Left and Right with Minimum Assistance  4. Sit to stand transfer with Minimal Assistance  5. Bed to chair transfer with Minimal Assistance using Rolling Walker  6. Gait  x 150 feet with Minimal Assistance using Rolling Walker.   7. Wheelchair propulsion x 200 feet with Stand-by Assistance using  right upper and lower extremities.  8. Ascend/descend 4 steps with bilateral Handrails Minimal Assistance.                  PLAN:    Patient to be seen daily  to address the above listed problems via gait training, therapeutic activities, therapeutic exercises, therapeutic groups, neuromuscular re-education, wheelchair management/training  Plan of Care expires: 11/29/18  Plan of Care reviewed with: patient     I certify that I was present in the room directing the student, Sugey Swift, in service delivery and guiding them using my skilled judgment. As the co-signing therapist I have reviewed the students documentation and am responsible for the treatment, assessment, and plan.     Sugey Swift, SPT 11/15/2018

## 2018-11-15 NOTE — PLAN OF CARE
Problem: Occupational Therapy Goal  Goal: Occupational Therapy Goal  Goals to be met by:     Patient will increase functional independence with ADLs by performing:    Feeding with Modified [place].  UE Dressing with Set-up Assistance.  LE Dressing with Stand-by Assistance.  Grooming while seated with Set-up Assistance.   Shower transfer to TTB or shower chair with Beth and wall bar.  Bathing from  shower chair/bench with Minimal Assistance.  Toileting from bedside commode over toilet with Minimal Assistance for hygiene and clothing management.  Toilet transfer to bedside commode over toilet with Contact Guard Assistance.   Sitting at edge of bed x15 minutes with Stand-by Assistance while participating in ADL or exercise.   Outcome: Ongoing (interventions implemented as appropriate)  OT tx for NME.

## 2018-11-15 NOTE — CONSULTS
"  [facility]  Adult Nutrition  Consult Note    SUMMARY     Recommendations    Recommendation: 1)  Continue consistent carbohydrate, cardiac diet and recommend adding 1800 calorie 2) Consider checking pt's a1C 2/2 no evidence of a1C per chart review and pt is on metformin with POCT checks  Goals: 1) Pt will consume >=75% meals during admit  Nutrition Goal Status: new  Communication of RD Recs: (POC, sticky note)    Reason for Assessment    Reason for Assessment: consult  Diagnosis: stroke/CVA  Relevant Medical History: DM2, HTN, Chronic back pain, arthritis  Interdisciplinary Rounds: attended  General Information Comments: Admitted with CVA, left hemiparesis.  Pt in therapy. No weight hx >=1 year. NFPE to be completed at follow up.  Meal intake reflects excellent appetite. (mini mental score per SLP is 14/30)  Nutrition Discharge Planning: consisent carbohydrate, cardiac diet     Nutrition Risk Screen    Nutrition Risk Screen: no indicators present    Nutrition/Diet History    Do you have any cultural, spiritual, Lutheran conflicts, given your current situation?: none  Food Allergies: NKFA    Anthropometrics    Temp: 98.3 °F (36.8 °C)  Height Method: Stated  Height: 5' 8"  Height (inches): 68 in  Weight Method: Bed Scale  Weight: 80.2 kg (176 lb 12.9 oz)  Weight (lb): 176.81 lb  Ideal Body Weight (IBW), Female: 140 lb  % Ideal Body Weight, Female (lb): 126.29 lb  BMI (Calculated): 26.9  BMI Grade: 25 - 29.9 - overweight       Lab/Procedures/Meds    Pertinent Labs Reviewed: reviewed  BMP  Lab Results   Component Value Date     11/14/2018    K 4.6 11/14/2018     11/14/2018    CO2 29 11/14/2018    BUN 14 11/14/2018    CREATININE 0.8 11/14/2018    CALCIUM 9.8 11/14/2018    ANIONGAP 6 (L) 11/14/2018    ESTGFRAFRICA >60 11/14/2018    EGFRNONAA >60 11/14/2018   2/2 renal issues  No results found for: LABA1C, HGBA1C  Recent Labs   Lab 11/15/18  0548   POCTGLUCOSE 183*   2/2 DM    Pertinent " Medications Reviewed: reviewed  Pertinent Medications Comments: metformin, asa, statin    Physical Findings/Assessment    Overall Physical Appearance: overweight(per BMI)  Oral/Mouth Cavity: tooth/teeth missing  Skin: (García score 18)    Estimated/Assessed Needs    Weight Used For Calorie Calculations: 80.2 kg (176 lb 12.9 oz)  Energy Calorie Requirements (kcal): 1732  Energy Need Method: [place]-St Jeor  Protein Requirements: 80-96  Weight Used For Protein Calculations: 80.2 kg (176 lb 12.9 oz)     Fluid Need Method: RDA Method(or per MD)  RDA Method (mL): 1732  CHO Requirement: 45-50% EEN      Nutrition Prescription Ordered    Current Diet Order: consistent carbohydrate, cardiac  Nutrition Order Comments: Came in with abnormal renal labs and was on  renal diet, Now resolved and renal diet discontinued.     Evaluation of Received Nutrient/Fluid Intake    Energy Calories Required: meeting needs  Protein Required: meeting needs  Fluid Required: not meeting needs  % Intake of Estimated Energy Needs: 75 - 100 %  % Meal Intake: 75 - 100 %    Nutrition Risk    Level of Risk/Frequency of Follow-up: (2 x wkly (2/2 need for NFPE))     Assessment and Plan     Altered nutrition related lab values r/t altered carbohydrate metabolism as evidenced by glucose of 265 with diagnosis of diabetes.    Monitor and Evaluation    Food and Nutrient Intake: energy intake  Food and Nutrient Adminstration: diet order  Anthropometric Measurements: weight, weight change  Biochemical Data, Medical Tests and Procedures: electrolyte and renal panel, inflammatory profile, glucose/endocrine profile  Nutrition-Focused Physical Findings: overall appearance, skin     Nutrition Follow-Up  yes

## 2018-11-15 NOTE — PLAN OF CARE
Problem: Physical Therapy Goal  Goal: Physical Therapy Goal  Goals to be met by: 2018     Patient will increase functional independence with mobility by performin. Supine to sit with Minimum Assistance  2. Sit to supine with Minimum Assistance  3. Rolling to Left and Right with Minimum Assistance  4. Sit to stand transfer with Minimal Assistance  5. Bed to chair transfer with Minimal Assistance using Rolling Walker  6. Gait  x 150 feet with Minimal Assistance using Rolling Walker.   7. Wheelchair propulsion x 200 feet with Stand-by Assistance using right upper and lower extremities.  8. Ascend/descend 4 steps with bilateral Handrails Minimal Assistance.   Outcome: Ongoing (interventions implemented as appropriate)  PT for gait training, therapeutic activity, wheelchair mobility, and neuromuscular re-education

## 2018-11-15 NOTE — PT/OT/SLP PROGRESS
Occupational Therapy  Treatment    Dennis Patricio   MRN: [id number]   Admitting Diagnosis: New onset right occipital parietal acute ischemic infarct with left hemiparesis.     OT Date of Treatment: 11/15/18   Total Time (min): 75 min      Billable Minutes:  Neuromuscular Re-education 75  Total Minutes: 75    General Precautions: Standard, fall, vision impaired  Orthopedic Precautions: N/A  Braces: N/A    Do you have any cultural, spiritual, Holiness conflicts, given your current situation?: none    Subjective:  Communicated with nurse prior to session.  Pt's status reported and discussed in initial team staffing.  Discharge date set for 11/29/2018.    Pain Rating 1: 0/10                   Objective:  Patient found with: de la cruz catheter(w/c alarm)    Additional Treatment:  OT tx consisted of neuromuscular re-education.    Patient left up in chair with call button in reach, chair alarm on and her daughter present and eating her lunch.    ASSESSMENT:  Dennis Patricio continues to require re-instruction to visually attend to the left side of midline.  Upon OT arrival to room she was sitting with LLE on floor between leg rests and head and body oriented to the left.  In performance of bilateral UE gross motor task performance at table level she required verbal and tactile instructions to perform at midline vrs in right environmental space.  She continues to require step by step instruction for increased efficiency and accuracy of movement and placement of the LUE which improves with attention to task and slowed movements.  Also with instruction to stop movement if adverse movement occurring and then to resume.  The area of most severe impairment during session was in gross motor hand movements to hold and release grasp and pinch on objects and wrist moving into flexion during placement tasks along with forearm pronation.    Rehab potential is good    Activity tolerance: Good    Discharge recommendations: home,  outpatient OT     Equipment recommendations: (TBD)     GOALS:   Multidisciplinary Problems     Occupational Therapy Goals        Problem: Occupational Therapy Goal    Goal Priority Disciplines Outcome Interventions   Occupational Therapy Goal     OT, PT/OT Ongoing (interventions implemented as appropriate)    Description:  Goals to be met by:     Patient will increase functional independence with ADLs by performing:    Feeding with Modified [place].  UE Dressing with Set-up Assistance.  LE Dressing with Stand-by Assistance.  Grooming while seated with Set-up Assistance.   Shower transfer to TTB or shower chair with Beth and wall bar.  Bathing from  shower chair/bench with Minimal Assistance.  Toileting from bedside commode over toilet with Minimal Assistance for hygiene and clothing management.  Toilet transfer to bedside commode over toilet with Contact Guard Assistance.   Sitting at edge of bed x15 minutes with Stand-by Assistance while participating in ADL or exercise.                    Plan:  Patient to be seen 6 x/week to address the above listed problems via self-care/home management, community/work re-entry, therapeutic activities, therapeutic exercises, therapeutic groups, neuromuscular re-education, cognitive retraining, sensory integration, wheelchair management/training  Plan of Care expires: 11/29/18  Plan of Care reviewed with: patient         Caterina Yosef OT  11/15/2018

## 2018-11-15 NOTE — PROGRESS NOTES
"REHAB FOLLOWUP NOTE    Dennis Patricio is a 68 y.o. female patient.    Chief Complaint:  Right occipital parietal acute ischemic infarct with left hemiparesis.    History:  68-year-old female initially presented to emergency department with complaints of blurred vision, left-sided weakness, unsteady gait, confusion that began 2 days prior to this hospitalization.  Patient was initially seen in the emergency room 2 days prior, noted to have elevated blood pressures and was discharged home with medications.  Patient was readmitted to hospital this time with  inability to get up and walk to the bathroom, felt she could not move her left leg and walk,patient was also unable to  objects with the left hand and felt heavy and numb.  Patient had a CT scan consistent with right posterior acute ischemic infarct, MRI consistent with  right occipital parietal ischemic infarct, carotids without any significant stenosis, echocardiogram with no thrombus or right to left shunt noted.  Patient has been medically stabilized and is transferred down to us for further rehabilitation.        Past Medical History:   Diagnosis Date    Arthritis     Back pain, chronic     Diabetes mellitus     New in 2018    Hypertension        Temp: 98.3 °F (36.8 °C) (11/15/18 0529)  Pulse: 80 (11/15/18 0529)  Resp: 18 (11/15/18 0529)  BP: (!) 165/79 (11/15/18 0529)  SpO2: 97 % (11/15/18 0529)  Weight: 80.2 kg (176 lb 11.2 oz) (11/09/18 1645)  Height: 5' 8" (172.7 cm) (11/09/18 1645)    Lab Results   Component Value Date    WBC 10.10 11/13/2018    HGB 12.5 11/13/2018    HCT 37.9 11/13/2018     (H) 11/13/2018    ALT 17 11/12/2018    AST 34 11/12/2018     11/14/2018    K 4.6 11/14/2018     11/14/2018    CREATININE 0.8 11/14/2018    BUN 14 11/14/2018    CO2 29 11/14/2018       Physical Examination:  Alert, oriented x3, voices no complaints.    Lungs are clear to auscultation.  Heart with regular rate and rhythm.  Bilateral " lower extremities without any edema or calf tenderness noted.  Left upper and lower extremity with decreased active to passive range of motion decreased motor strength.   Patient with urinary retention with Watkins catheter in place.  Acute renal failure resolved.    Blood pressure and blood sugars monitor and adjust medication.    Impression:  Status post new onset right occipital parietal acute ischemic infarct with left hemiparesis.  Acute renal failure secondary to urinary retention.  Hypertension.  Diabetes mellitus.  Degenerative joint disease.      Recommendations:  Continue current PT, OT, speech therapy and nursing care.        Dominique Brink MD  11/15/2018

## 2018-11-15 NOTE — PROGRESS NOTES
Team conference attended and patient progress discussed.  Met with patient and her daughter in room to provide update on progress and ELOS.  No questions or concerns voiced at this time.  Will continue to assist with discharge planning.

## 2018-11-15 NOTE — PATIENT CARE CONFERENCE
Weekly Staffing Report      Date Admitted: 11/9/2018 :   Staffing Date: 11/15/2018     Patient Active Problem List   Diagnosis    CVA (cerebral vascular accident)          Team Members Present:       Nursing  Skin: Intact  Bowel: Continent  Bladder:de la cruz catheter  Diet: diabetic, 1800 calorie  Appetite: good   Pain Level: 0/10    Physical Therapy  Supine to Sit:  contact guard  Sit to Stand: moderate assist  Gait: 0-25 feet maximal assist Parallel bars  Wheelchair Mobility: 100-125 feet moderate assist  Stairs: N/A      Occupational Therapy  Feeding: standy by assistance  Grooming:minimal assist  UED: moderate assist  LED: maximal assist  Bathing:moderate assist   Toileting:total assist  Toilet Transfer:  maximal assist  Tub Transfer:  total assist      Speech Therapy  Swallow: WFL.  MMS: 14/30  Memory: minimal assist  Receptive Language: standy by assistance  Expressive Language: modified independent  Problem solving: minimal assist            Goals:  Min.asst.      Tolerates 3 hours of therapy: Yes    Discharge Destination: Home with out pt.    Estimated Length of Stay: 11/29/18.

## 2018-11-15 NOTE — PLAN OF CARE
Problem: Patient Care Overview  Goal: Plan of Care Review  Outcome: Ongoing (interventions implemented as appropriate)  Patient awake/alert. Left sided weakness noted.Free from falls. Plan of care continued

## 2018-11-15 NOTE — PLAN OF CARE
Problem: Patient Care Overview  Goal: Individualization & Mutuality  Recommendation: 1)  Continue consistent carbohydrate, cardiac diet and recommend adding 1800 calorie 2) Consider checking pt's a1C 2/2 no evidence of a1C per chart review and pt is on metformin with POCT checks  Goals: 1) Pt will consume >=75% meals during admit  Nutrition Goal Status: new  Communication of RD Recs: (POC, sticky note)

## 2018-11-16 LAB — POCT GLUCOSE: 199 MG/DL (ref 70–110)

## 2018-11-16 PROCEDURE — 97127 HC THERAPEUTIC INTVTN, COGN FUNCTION - ST: CPT

## 2018-11-16 PROCEDURE — 63600175 PHARM REV CODE 636 W HCPCS: Performed by: PHYSICAL MEDICINE & REHABILITATION

## 2018-11-16 PROCEDURE — 25000003 PHARM REV CODE 250: Performed by: PHYSICAL MEDICINE & REHABILITATION

## 2018-11-16 PROCEDURE — 97110 THERAPEUTIC EXERCISES: CPT

## 2018-11-16 PROCEDURE — 97535 SELF CARE MNGMENT TRAINING: CPT

## 2018-11-16 PROCEDURE — 97116 GAIT TRAINING THERAPY: CPT

## 2018-11-16 PROCEDURE — 97530 THERAPEUTIC ACTIVITIES: CPT

## 2018-11-16 PROCEDURE — 12800000 HC REHAB SEMI-PRIVATE ROOM

## 2018-11-16 RX ADMIN — POLYETHYLENE GLYCOL 3350 17 G: 17 POWDER, FOR SOLUTION ORAL at 09:11

## 2018-11-16 RX ADMIN — AMLODIPINE BESYLATE 10 MG: 5 TABLET ORAL at 09:11

## 2018-11-16 RX ADMIN — ENOXAPARIN SODIUM 40 MG: 100 INJECTION SUBCUTANEOUS at 04:11

## 2018-11-16 RX ADMIN — METFORMIN HYDROCHLORIDE 1000 MG: 500 TABLET, EXTENDED RELEASE ORAL at 04:11

## 2018-11-16 RX ADMIN — CLOPIDOGREL BISULFATE 75 MG: 75 TABLET ORAL at 09:11

## 2018-11-16 RX ADMIN — ATORVASTATIN CALCIUM 80 MG: 40 TABLET, FILM COATED ORAL at 09:11

## 2018-11-16 RX ADMIN — LISINOPRIL 10 MG: 10 TABLET ORAL at 09:11

## 2018-11-16 RX ADMIN — ASPIRIN 81 MG CHEWABLE TABLET 81 MG: 81 TABLET CHEWABLE at 09:11

## 2018-11-16 NOTE — PLAN OF CARE
Problem: Patient Care Overview  Goal: Plan of Care Review  Outcome: Ongoing (interventions implemented as appropriate)  Pt alert and oriented. Mod assist with transfers. Watkins cath in place and draining. Cont of  bowels. Plan of care continued. Call light in reach

## 2018-11-16 NOTE — PLAN OF CARE
Problem: Physical Therapy Goal  Goal: Physical Therapy Goal  Goals to be met by: 2018     Patient will increase functional independence with mobility by performin. Supine to sit with Minimum Assistance  2. Sit to supine with Minimum Assistance  3. Rolling to Left and Right with Minimum Assistance  4. Sit to stand transfer with Minimal Assistance  5. Bed to chair transfer with Minimal Assistance using Rolling Walker  6. Gait  x 150 feet with Minimal Assistance using Rolling Walker.   7. Wheelchair propulsion x 200 feet with Stand-by Assistance using right upper and lower extremities.  8. Ascend/descend 4 steps with bilateral Handrails Minimal Assistance.   Outcome: Ongoing (interventions implemented as appropriate)  Patient participated in gait training, therapeutic exercise and activities to improve functional mobility, improve ADL's and promote safe ambulation to decrease fall risk.

## 2018-11-16 NOTE — PT/OT/SLP PROGRESS
"Speech Language Pathology Treatment          Dennis Patricio   MRN: [id number]     Diet recommendations: Solid Diet Level: Regular  Liquid Diet Level: Thin Standard aspiration precautions    SLP Treatment Date: 11/16/18  Speech Start Time: 1055     Speech Stop Time: 1125     Speech Total (min): 30 min       TREATMENT BILLABLE MINUTES:  Speech Therapy Individual 30 and Total Time 30    General Precautions: Standard, fall  Current Respiratory Status: room air       Subjective:  "I'm focusing more."  "I have to remember the ocean, the [place] and the [place]."    Objective:   Patient found in room sitting in w/c with heating pad to L knee. Pt alert, pleasant and cooperative. Sister at bedside. After review of memory strategies pt able to recall 3/3 related words after 5 minutes independently and 3/3 unrelated words after 5 minutes given extra time and cue for use of memory strategies. She was able to name an average of 11 items in concrete catagory, in 1 minute, across 4 attempts.     Assessment:  Dennis Patricio is a 68 y.o. female with a SLP diagnosis of Cognitive-Linguistic Impairment. She present with good effort.    Discharge recommendations: Discharge Facility/Level Of Care Needs: home     Goals:   Multidisciplinary Problems     SLP Goals        Problem: SLP Goal    Goal Priority Disciplines Outcome   SLP Goal     SLP Ongoing (interventions implemented as appropriate)   Description:  1.  Patient will name common objects with SB assist  2.  Patient will complete visuospatial tasks with MOD assist  3.  Patient will recall 3/3 related objects with MOD assist for use of short term memory strategies  4.  Patient will require MIN assist to name 10 objects in a simple category  5.  Patient will perform simple/moderate problem solving with MIN assist                     Plan:   Patient to be seen Therapy Frequency: 5 x/week   Planned Interventions: Cognitive-Linguistic Therapy  Plan of Care Expires: 11/26/18  Plan of Care " reviewed with: patient, sibling  SLP Follow-up?: Yes  SLP - Next Visit Date: 11/15/18           Sherin Amaral CCC-SLP 11/16/2018

## 2018-11-16 NOTE — PT/OT/SLP PROGRESS
Physical Therapy         Treatment        Dennis Patricio   MRN: [id number]     PT Received On: 11/16/18           Billable Minutes:  Gait Training 25 Therapeutic Activity 28 and Therapeutic Exercise 22  Total Minutes: 75    Treatment Type: Treatment  PT/PTA: PTA     PTA Visit Number: 1       General Precautions: Standard, vision impaired, fall  Orthopedic Precautions: Orthopedic Precautions : N/A   Braces: Braces: N/A         Subjective:  Communicated with nurse Camacho prior to session.    Pain/Comfort  Pain Rating 1: 0/10  Pain Addressed 1: Pre-medicate for activity  Pain Rating Post-Intervention 1: 0/10    Objective:  Patient found sitting up in WC, Patient found with: de la cruz catheter      Balance:   Static Stand: 0: Needs MAXIMAL assist to maintain   Dynamic stand: 0: N/A    Transfer Training:  Sit to stand:Maximum Assistance and x 2 with Rolling Walker and with a  hand splint .    Wheelchair Training:  Pt propelled Standard wheelchair x 125 feet on Level tile with  Bilateral lower extremity with Moderate Assistance.  Patient requires max VC for turns and on straight walls to maintain control of WC. Pt tends to drift into walls.    Gait Training:  Patient gait trained FWB/WBAT: bilateral lower extremity 23'  feet on level tile with Rolling Walker and with hand splint with Maximum Assistance and x 2 persons.  Pt with demonstarting a  3-point gait, swing to and needing assistance controlling RW. with increased time in double stance, decreased velocity of limb motion, decreased step length, decreased swing-to-stance ratio and decreased toe-to-floor clearance.Impairments contributing to gait deviations include impaired balance, impaired coordination, impaired motor control, impaired postural control, decreased sensation and impaired sensory feedback . Has backward lean during gait.    Additional Treatment:  3x10 with 2# BLE exercises: GS, hip flexion, LAQ.  Sit to stand from WC x 4 with MAX A. Partial sit to stand  from  with VC x 10.    Activity Tolerance:  Patient tolerated treatment well and Patient limited by fatigue    Patient left up in chair with all lines intact, chair alarm on and nurse Doug notified.    Assessment:  Dennis Patricio is a 68 y.o. female with a medical diagnosis of <principal problem not specified>. She presents with CVA Right with L shona. She has poor proprioception and gait limitations requiring max assistance of 2.     Rehab potential is fair.    Activity tolerance: Fair    Discharge recommendations: Discharge Facility/Level Of Care Needs: home, outpatient OT     Equipment recommendations:       GOALS:   Multidisciplinary Problems     Physical Therapy Goals        Problem: Physical Therapy Goal    Goal Priority Disciplines Outcome Goal Variances Interventions   Physical Therapy Goal     PT, PT/OT Ongoing (interventions implemented as appropriate)     Description:  Goals to be met by: 2018     Patient will increase functional independence with mobility by performin. Supine to sit with Minimum Assistance  2. Sit to supine with Minimum Assistance  3. Rolling to Left and Right with Minimum Assistance  4. Sit to stand transfer with Minimal Assistance  5. Bed to chair transfer with Minimal Assistance using Rolling Walker  6. Gait  x 150 feet with Minimal Assistance using Rolling Walker.   7. Wheelchair propulsion x 200 feet with Stand-by Assistance using right upper and lower extremities.  8. Ascend/descend 4 steps with bilateral Handrails Minimal Assistance.                    PLAN:    Patient to be seen daily  to address the above listed problems via gait training, therapeutic activities, therapeutic exercises, therapeutic groups, neuromuscular re-education, wheelchair management/training  Plan of Care expires: 18  Plan of Care reviewed with: patient         Melodie FERRERA Royer, PTA 2018

## 2018-11-16 NOTE — PLAN OF CARE
Problem: Patient Care Overview  Goal: Plan of Care Review  Outcome: Ongoing (interventions implemented as appropriate)  Alert. Requested to be turned on her right side. Pt also able to turn self. Has de la cruz catheter. Urine yellow in color. Able to move but weaker on left side. Sleeping on rounds. Bed alarm on. Cont poc

## 2018-11-16 NOTE — PROGRESS NOTES
[facility]  Physical Medicine & Rehab  Progress Note    Patient Name: Dennis Patricio  MRN: [id number]  Patient Class: IP- Rehab   Admission Date: 11/9/2018  Length of Stay: 7 days  Attending Physician: Dominique Brink MD    Subjective:     Principal Problem:  CVA  Interval History: 11/16/2018:  Patient is seen for follow-up rehab evaluation and recommendations: pt is 68 y.o female with dx of acute CVA, participating with therapy    HPI, Past Medical, Family, and Social History remains the same as documented in the initial encounter.    Scheduled Medications:    amLODIPine  10 mg Oral Daily    aspirin  81 mg Oral Daily    atorvastatin  80 mg Oral QHS    clopidogrel  75 mg Oral Daily    enoxaparin  40 mg Subcutaneous Daily    lisinopril  10 mg Oral QHS    metFORMIN  1,000 mg Oral with dinner    polyethylene glycol  17 g Oral Daily       PRN Medications: acetaminophen, dextrose 50%, dextrose 50%, glucagon (human recombinant), glucose, glucose, insulin aspart U-100, lactulose, ondansetron, traMADol    Review of Systems   Constitutional: Negative.    HENT: Negative.    Eyes: Negative.    Respiratory: Negative.    Cardiovascular: Negative.    Gastrointestinal: Negative.    Endocrine: Negative.    Genitourinary: Negative.    Musculoskeletal: Negative.    Skin: Negative.    Allergic/Immunologic: Negative.    Neurological: Negative.    Hematological: Negative.    Psychiatric/Behavioral: Negative.      Objective:     Vital Signs (Most Recent):  Temp: 98.6 °F (37 °C) (11/16/18 0604)  Pulse: 88 (11/16/18 0604)  Resp: 20 (11/16/18 0604)  BP: (!) 145/84 (11/16/18 0604)  SpO2: (!) 94 % (11/16/18 0604)    Vital Signs (24h Range):  Temp:  [98.6 °F (37 °C)-98.7 °F (37.1 °C)] 98.6 °F (37 °C)  Pulse:  [86-89] 88  Resp:  [18-20] 20  SpO2:  [94 %-99 %] 94 %  BP: (145-167)/(80-84) 145/84     Physical Exam   Constitutional: She is oriented to person, place, and time. She appears well-developed and  well-nourished. No distress.   HENT:   Head: Normocephalic and atraumatic.   Right Ear: External ear normal.   Left Ear: External ear normal.   Nose: Nose normal.   Mouth/Throat: Oropharynx is clear and moist. No oropharyngeal exudate.   Eyes: Conjunctivae and EOM are normal. Pupils are equal, round, and reactive to light. Right eye exhibits no discharge. Left eye exhibits no discharge. No scleral icterus.   Neck: Normal range of motion. Neck supple. No JVD present. No tracheal deviation present. No thyromegaly present.   Cardiovascular: Normal rate, regular rhythm, normal heart sounds and intact distal pulses. Exam reveals no gallop and no friction rub.   No murmur heard.  Pulmonary/Chest: Effort normal and breath sounds normal. No stridor. No respiratory distress. She has no wheezes. She has no rales. She exhibits no tenderness.   Abdominal: Soft. Bowel sounds are normal. She exhibits no distension. There is no tenderness. There is no rebound and no guarding.   Genitourinary:   Genitourinary Comments: deferred   Musculoskeletal: Normal range of motion. She exhibits no edema, tenderness or deformity.   Lymphadenopathy:     She has no cervical adenopathy.   Neurological: She is alert and oriented to person, place, and time. No cranial nerve deficit. She exhibits normal muscle tone. Coordination normal.   Skin: No rash noted. She is not diaphoretic. No erythema. No pallor.   Psychiatric: She has a normal mood and affect. Her behavior is normal.     NEUROLOGICAL EXAMINATION:     MENTAL STATUS   Oriented to person, place, and time.     CRANIAL NERVES     CN III, IV, VI   Pupils are equal, round, and reactive to light.  Extraocular motions are normal.       Assessment/Plan:      Dennis Patricio is a 68 y.o. female admitted to inpatient rehabilitation on 11/9/2018 for <principal problem not specified> with impaired mobility and ADLs. Patient remains appropriate for PT, OT, and as required Speech therapy. Patient  continues to require 24 hour nursing care as well as daily Physician assessment.    Active Diagnoses:    Diagnosis Date Noted POA    CVA (cerebral vascular accident) [I63.9] 11/09/2018 Yes      Problems Resolved During this Admission:     CVA, cont therapy  HTN, vitals noted, cont current med  Anticoagulation, cont asa & Plavix  DVT prophylaxis, cont sq Lovenox    DISCHARGE PLANNING:  Tentative Discharge Date:     No future appointments.    Moi Byers MD  Department of Physical Medicine & Rehab  [facility]

## 2018-11-16 NOTE — PT/OT/SLP PROGRESS
Occupational Therapy  Treatment    Dennis Patricio   MRN: [id number]   Admitting Diagnosis: New onset right occipital parietal acute ischemic infarct with left hemiparesis    OT Date of Treatment: 11/16/18   Total Time (min): 75 min    Billable Minutes:  Self Care/Home Management 75  Total Minutes: 75    General Precautions: Standard, fall    Do you have any cultural, spiritual, Oriental orthodox conflicts, given your current situation?: none    Subjective:  Communicated with nurse prior to session.    Pain/Comfort  Pain Rating 1: 0/10    Objective:  Patient found with: de la cruz catheter    Functional Mobility:  Bed Mobility:   Supine to sit: Standby Assistance   Sit to supine: Standby Assistance   Rolling: Standby Assistance   Scooting: Standby Assistance    Transfer Training:  Bed <> chair transfer: max A with verbal cues and [place] A for guiding her L UE  Shower transfer: Max A with verbal cues and [place] A for guiding her L UE    Bathing:  Patient performed bathing with mod A with grab bar, handheld shower head, long handled sponge and shower chair.    UE Dressing:  Mod A with  A pullover shirt    LE Dressing:  Max A    Balance:   Static Sit: GOOD: Takes MODERATE challenges from all directions  Dynamic Sit:  GOOD: Maintains balance through MODERATE excursions of active trunk movement  Static Stand: POOR+: Needs MINIMAL assist to maintain  Dynamic stand: POOR: Needs MOD (moderate) assist during gait    Additional Treatment:     Patient left up in chair with all lines intact, call button in reach and chair alarm on    ASSESSMENT:  Patient requires assistance with guiding her L UE with transfers. Patient presents with self care skills deficits and transfer skills deficits. Patient would benefit from continued skilled occupational therapy services to improve her independence and mobility.    Rehab potential is good    Activity tolerance: good      GOALS:   Multidisciplinary Problems     Occupational Therapy Goals        Problem:  Occupational Therapy Goal    Goal Priority Disciplines Outcome Interventions   Occupational Therapy Goal     OT, PT/OT Ongoing (interventions implemented as appropriate)    Description:  Goals to be met by:     Patient will increase functional independence with ADLs by performing:    Feeding with Modified [place].  UE Dressing with Set-up Assistance.  LE Dressing with Stand-by Assistance.  Grooming while seated with Set-up Assistance.   Shower transfer to TTB or shower chair with [place] and wall bar.  Bathing from  shower chair/bench with Minimal Assistance.  Toileting from bedside commode over toilet with Minimal Assistance for hygiene and clothing management.  Toilet transfer to bedside commode over toilet with Contact Guard Assistance.   Sitting at edge of bed x15 minutes with Stand-by Assistance while participating in ADL or exercise.                    Plan:  Patient to be seen 6 x/week to address the above listed problems via self-care/home management, community/work re-entry, therapeutic activities, therapeutic exercises, therapeutic groups, neuromuscular re-education, cognitive retraining, sensory integration, wheelchair management/training  Plan of Care expires: 11/29/18  Plan of Care reviewed with: patient      ORVILLE Tavera    11/16/2018

## 2018-11-16 NOTE — PT/OT/SLP PROGRESS
"Speech Language Pathology Treatment          Dennis Patricio   MRN: [id number]     SLP Treatment Date: 11/16/18  Speech Start Time: 1320     Speech Stop Time: 1350     Speech Total (min): 30 min       TREATMENT BILLABLE MINUTES:  Speech Therapy Individual 30 and Total Time 30    General Precautions: Standard, vision impaired, fall  Current Respiratory Status: room air       Subjective:  "I missed [place] for the past 2 weeks"  "I can't remember all this. I gotta write it down"  Pt alert and cooperative     Objective:   Patient found upright in wheelchair w/ alarm and belt. MAX A required for basic calculations involving pill and time mgmt. Decreased performance appeared mostly related to Pt's inattention of stimuli. Therefore, task discontinued and category exclusion task introduced. Category exclusion completed with <50% acc increasing minimally with use of verbal cueing to enhance understanding of semantic relations of items. To reinforce categorization skills, provided Pt with 3 similar items and prompted to provide/ID category which she was able to with 100% acc.    Pain/Comfort  Pain Rating 1: 0/10    Assessment:  Dennis Patricio is a 68 y.o. female with a SLP diagnosis of Cognitive-Linguistic Impairment.       Discharge recommendations: Discharge Facility/Level Of Care Needs: home     Goals:   Multidisciplinary Problems     SLP Goals        Problem: SLP Goal    Goal Priority Disciplines Outcome   SLP Goal     SLP Ongoing (interventions implemented as appropriate)   Description:  1.  Patient will name common objects with SB assist  2.  Patient will complete visuospatial tasks with MOD assist  3.  Patient will recall 3/3 related objects with MOD assist for use of short term memory strategies  4.  Patient will require MIN assist to name 10 objects in a simple category  5.  Patient will perform simple/moderate problem solving with MIN assist                     Plan:   Patient to be seen Therapy Frequency: 5 " x/week   Planned Interventions: Cognitive-Linguistic Therapy  Plan of Care Expires: 11/26/18  Plan of Care reviewed with: patient  SLP Follow-up?: Yes  SLP - Next Visit Date: 11/15/18           Vasile Cook CCC-SLP 11/16/2018

## 2018-11-16 NOTE — PLAN OF CARE
Problem: SLP Goal  Goal: SLP Goal  1.  Patient will name common objects with SB assist  2.  Patient will complete visuospatial tasks with MOD assist  3.  Patient will recall 3/3 related objects with MOD assist for use of short term memory strategies  4.  Patient will require MIN assist to name 10 objects in a simple category  5.  Patient will perform simple/moderate problem solving with MIN assist   Progressing towards goals. Continue POC.    Sherin Amaral MS, CCC/SLP 11/16/2018

## 2018-11-16 NOTE — NURSING
Blood glucose 199. Requested to get up in chair. Now up in chair. Mod assist with transfers. Wheel chair alarm on. Slept on and off. Call light within reach

## 2018-11-17 LAB — POCT GLUCOSE: 185 MG/DL (ref 70–110)

## 2018-11-17 PROCEDURE — 97110 THERAPEUTIC EXERCISES: CPT

## 2018-11-17 PROCEDURE — 25000003 PHARM REV CODE 250: Performed by: PHYSICAL MEDICINE & REHABILITATION

## 2018-11-17 PROCEDURE — 63600175 PHARM REV CODE 636 W HCPCS: Performed by: PHYSICAL MEDICINE & REHABILITATION

## 2018-11-17 PROCEDURE — 12800000 HC REHAB SEMI-PRIVATE ROOM

## 2018-11-17 RX ADMIN — ENOXAPARIN SODIUM 40 MG: 100 INJECTION SUBCUTANEOUS at 05:11

## 2018-11-17 RX ADMIN — AMLODIPINE BESYLATE 10 MG: 5 TABLET ORAL at 09:11

## 2018-11-17 RX ADMIN — LACTULOSE 20 G: 20 SOLUTION ORAL at 09:11

## 2018-11-17 RX ADMIN — ATORVASTATIN CALCIUM 80 MG: 40 TABLET, FILM COATED ORAL at 08:11

## 2018-11-17 RX ADMIN — ASPIRIN 81 MG CHEWABLE TABLET 81 MG: 81 TABLET CHEWABLE at 09:11

## 2018-11-17 RX ADMIN — METFORMIN HYDROCHLORIDE 1000 MG: 500 TABLET, EXTENDED RELEASE ORAL at 05:11

## 2018-11-17 RX ADMIN — CLOPIDOGREL BISULFATE 75 MG: 75 TABLET ORAL at 09:11

## 2018-11-17 RX ADMIN — LISINOPRIL 10 MG: 10 TABLET ORAL at 08:11

## 2018-11-17 RX ADMIN — POLYETHYLENE GLYCOL 3350 17 G: 17 POWDER, FOR SOLUTION ORAL at 09:11

## 2018-11-17 NOTE — PLAN OF CARE
Problem: Patient Care Overview  Goal: Plan of Care Review  Outcome: Revised  Pt is AAOx4.  Watkins catheter maintained, care performed, emptied as needed.  BG monitored, SSI given prn as ordered.  VSS, in NAD, pt remains afebrile.  Pt remains free from injury.  Bed in low position, wheels locked, call light within reach.  Pt verbalized understanding of POC.  Will continue to monitor.

## 2018-11-17 NOTE — PLAN OF CARE
Problem: Physical Therapy Goal  Goal: Physical Therapy Goal  Goals to be met by: 2018     Patient will increase functional independence with mobility by performin. Supine to sit with Minimum Assistance  2. Sit to supine with Minimum Assistance  3. Rolling to Left and Right with Minimum Assistance  4. Sit to stand transfer with Minimal Assistance  5. Bed to chair transfer with Minimal Assistance using Rolling Walker  6. Gait  x 150 feet with Minimal Assistance using Rolling Walker.   7. Wheelchair propulsion x 200 feet with Stand-by Assistance using right upper and lower extremities.  8. Ascend/descend 4 steps with bilateral Handrails Minimal Assistance.   Outcome: Ongoing (interventions implemented as appropriate)  Patient participated in therapeutic exercise and activities to improve functional mobility, improve ADL's and promote safe ambulation to decrease fall risk.

## 2018-11-17 NOTE — PROGRESS NOTES
[facility]  Physical Medicine & Rehab  Progress Note    Patient Name: Dennis Patricio  MRN: [id number]  Patient Class: IP- Rehab   Admission Date: 11/9/2018  Length of Stay: 8 days  Attending Physician: Dominique Brink MD    Subjective:     Principal Problem: CVA  Interval History: 11/17/2018:  Patient is seen for follow-up rehab evaluation and recommendations: pt is 68 y.o female with dx of CVA, participating with therapy     HPI, Past Medical, Family, and Social History remains the same as documented in the initial encounter.    Scheduled Medications:    amLODIPine  10 mg Oral Daily    aspirin  81 mg Oral Daily    atorvastatin  80 mg Oral QHS    clopidogrel  75 mg Oral Daily    enoxaparin  40 mg Subcutaneous Daily    lisinopril  10 mg Oral QHS    metFORMIN  1,000 mg Oral with dinner    polyethylene glycol  17 g Oral Daily       PRN Medications: acetaminophen, dextrose 50%, dextrose 50%, glucagon (human recombinant), glucose, glucose, insulin aspart U-100, lactulose, ondansetron, traMADol    Review of Systems   Constitutional: Negative.    HENT: Negative.    Eyes: Negative.    Respiratory: Negative.    Cardiovascular: Negative.    Gastrointestinal: Negative.    Endocrine: Negative.    Genitourinary: Negative.    Musculoskeletal: Negative.    Skin: Negative.    Allergic/Immunologic: Negative.    Neurological: Negative.    Hematological: Negative.    Psychiatric/Behavioral: Negative.      Objective:     Vital Signs (Most Recent):  Temp: 97.8 °F (36.6 °C) (11/17/18 0932)  Pulse: 93 (11/17/18 0932)  Resp: 16 (11/17/18 0932)  BP: (!) 147/70 (11/17/18 0932)  SpO2: 99 % (11/17/18 0932)    Vital Signs (24h Range):  Temp:  [97.8 °F (36.6 °C)-98.7 °F (37.1 °C)] 97.8 °F (36.6 °C)  Pulse:  [80-93] 93  Resp:  [16-20] 16  SpO2:  [95 %-99 %] 99 %  BP: (147-163)/(66-87) 147/70     Physical Exam   Constitutional: She is oriented to person, place, and time. She appears well-developed and  well-nourished. No distress.   HENT:   Head: Normocephalic and atraumatic.   Right Ear: External ear normal.   Left Ear: External ear normal.   Nose: Nose normal.   Mouth/Throat: Oropharynx is clear and moist. No oropharyngeal exudate.   Eyes: Conjunctivae and EOM are normal. Pupils are equal, round, and reactive to light. Right eye exhibits no discharge. Left eye exhibits no discharge. No scleral icterus.   Neck: Normal range of motion. Neck supple. No JVD present. No tracheal deviation present. No thyromegaly present.   Cardiovascular: Normal rate, regular rhythm, normal heart sounds and intact distal pulses. Exam reveals no gallop and no friction rub.   No murmur heard.  Pulmonary/Chest: Effort normal and breath sounds normal. No stridor. No respiratory distress. She has no wheezes. She has no rales. She exhibits no tenderness.   Abdominal: Soft. Bowel sounds are normal. She exhibits no distension. There is no tenderness. There is no rebound and no guarding.   Genitourinary:   Genitourinary Comments: deferred   Musculoskeletal: Normal range of motion. She exhibits no edema, tenderness or deformity.   Lymphadenopathy:     She has no cervical adenopathy.   Neurological: She is alert and oriented to person, place, and time. No cranial nerve deficit or sensory deficit. She exhibits normal muscle tone. Coordination normal.   Skin: No rash noted. She is not diaphoretic. No erythema. No pallor.   Psychiatric: She has a normal mood and affect. Her behavior is normal.     NEUROLOGICAL EXAMINATION:     MENTAL STATUS   Oriented to person, place, and time.     CRANIAL NERVES     CN III, IV, VI   Pupils are equal, round, and reactive to light.  Extraocular motions are normal.       Assessment/Plan:      Dennis Patricio is a 68 y.o. female admitted to inpatient rehabilitation on 11/9/2018 for <principal problem not specified> with impaired mobility and ADLs. Patient remains appropriate for PT, OT, and as required Speech  therapy. Patient continues to require 24 hour nursing care as well as daily Physician assessment.    Active Diagnoses:    Diagnosis Date Noted POA    CVA (cerebral vascular accident) [I63.9] 11/09/2018 Yes      Problems Resolved During this Admission:     CVA, cont therapy  HTN, vitals noted, cont current meds  Anticoagulation, cont asa & Plavix  DVT prophylaxis, cont sq Lovenox  DM, accu check noted, cont current med    DISCHARGE PLANNING:  Tentative Discharge Date:     No future appointments.    Moi Byers MD  Department of Physical Medicine & Rehab  [facility]

## 2018-11-17 NOTE — PLAN OF CARE
Problem: Fall Risk (Adult)  Goal: Absence of Falls  Patient will demonstrate the desired outcomes by discharge/transition of care.  Outcome: Ongoing (interventions implemented as appropriate)  Pt alert and oriented. Mod assist with transfers. Bed/chair alarm at all times. Call light in reach

## 2018-11-17 NOTE — PT/OT/SLP PROGRESS
Physical Therapy         Treatment        Dennis Patricio   MRN: [id number]     PT Received On: 11/17/18  Total Time (min): 30        Billable Minutes:  Therapeutic Exercise 30  Total Minutes: 30    Treatment Type: Treatment  PT/PTA: PTA     PTA Visit Number: 2       General Precautions: Standard, fall  Orthopedic Precautions: Orthopedic Precautions : N/A   Braces: Braces: N/A         Subjective:  Communicated with nurse Doug prior to session. Patient had no complaints.  Pain/Comfort  Pain Rating 1: 0/10  Pain Addressed 1: Pre-medicate for activity  Pain Rating Post-Intervention 1: 0/10    Objective:  Patient found up in WC. Patient found with: de la rcuz catheter. BP taken before treatment. 134/78.      Wheelchair Training:  Pt propelled Standard wheelchair x 125 feet on Level tile with RUE and RLE with Moderate Assistance and TC and VC.       Additional Treatment:  Therex in sitting with 2# 2 x 10: GS/QS with BLE on mat, hip flexion, ball squeeze, hip abd with GTB.  Sci-Fit L-1 x 10 min with BLE and BUE  LUE held onto arm with ace wrap.    Activity Tolerance:  Patient tolerated treatment well    Patient left up in chair with chair alarm on and nurse Doug notified.    Assessment:  Dennis Patricio is a 68 y.o. female with a medical diagnosis of <principal problem not specified>. She presents with R CVA, L shona. Patient is pleasant and not motivated to exercise and not talkative. He requires a lot of VC for form during exercises to complete his range as he can be in a hurry at times. But he is a pleasure to work with.    Rehab potential is good.    Activity tolerance: Good    Discharge recommendations: Discharge Facility/Level Of Care Needs: home     Equipment recommendations:       GOALS:   Multidisciplinary Problems     Physical Therapy Goals        Problem: Physical Therapy Goal    Goal Priority Disciplines Outcome Goal Variances Interventions   Physical Therapy Goal     PT, PT/OT Ongoing (interventions  implemented as appropriate)     Description:  Goals to be met by: 2018     Patient will increase functional independence with mobility by performin. Supine to sit with Minimum Assistance  2. Sit to supine with Minimum Assistance  3. Rolling to Left and Right with Minimum Assistance  4. Sit to stand transfer with Minimal Assistance  5. Bed to chair transfer with Minimal Assistance using Rolling Walker  6. Gait  x 150 feet with Minimal Assistance using Rolling Walker.   7. Wheelchair propulsion x 200 feet with Stand-by Assistance using right upper and lower extremities.  8. Ascend/descend 4 steps with bilateral Handrails Minimal Assistance.                    PLAN:    Patient to be seen daily  to address the above listed problems via gait training, therapeutic activities, therapeutic exercises, therapeutic groups, wheelchair management/training, neuromuscular re-education  Plan of Care expires: 18  Plan of Care reviewed with: patient         Melodie Linton, PTA 2018

## 2018-11-18 LAB — POCT GLUCOSE: 173 MG/DL (ref 70–110)

## 2018-11-18 PROCEDURE — 63600175 PHARM REV CODE 636 W HCPCS: Performed by: PHYSICAL MEDICINE & REHABILITATION

## 2018-11-18 PROCEDURE — 97110 THERAPEUTIC EXERCISES: CPT

## 2018-11-18 PROCEDURE — 25000003 PHARM REV CODE 250: Performed by: PHYSICAL MEDICINE & REHABILITATION

## 2018-11-18 PROCEDURE — 12800000 HC REHAB SEMI-PRIVATE ROOM

## 2018-11-18 PROCEDURE — 97116 GAIT TRAINING THERAPY: CPT

## 2018-11-18 RX ORDER — HYDROCHLOROTHIAZIDE 12.5 MG/1
12.5 CAPSULE ORAL DAILY
Status: DISCONTINUED | OUTPATIENT
Start: 2018-11-19 | End: 2018-11-29 | Stop reason: HOSPADM

## 2018-11-18 RX ORDER — POTASSIUM CHLORIDE 600 MG/1
8 TABLET, FILM COATED, EXTENDED RELEASE ORAL ONCE
Status: DISCONTINUED | OUTPATIENT
Start: 2018-11-18 | End: 2018-11-18

## 2018-11-18 RX ORDER — HYDROCHLOROTHIAZIDE 12.5 MG/1
12.5 TABLET ORAL DAILY
Status: DISCONTINUED | OUTPATIENT
Start: 2018-11-18 | End: 2018-11-18

## 2018-11-18 RX ADMIN — POLYETHYLENE GLYCOL 3350 17 G: 17 POWDER, FOR SOLUTION ORAL at 09:11

## 2018-11-18 RX ADMIN — LISINOPRIL 10 MG: 10 TABLET ORAL at 08:11

## 2018-11-18 RX ADMIN — ATORVASTATIN CALCIUM 80 MG: 40 TABLET, FILM COATED ORAL at 08:11

## 2018-11-18 RX ADMIN — ENOXAPARIN SODIUM 40 MG: 100 INJECTION SUBCUTANEOUS at 04:11

## 2018-11-18 RX ADMIN — METFORMIN HYDROCHLORIDE 1000 MG: 500 TABLET, EXTENDED RELEASE ORAL at 04:11

## 2018-11-18 RX ADMIN — ASPIRIN 81 MG CHEWABLE TABLET 81 MG: 81 TABLET CHEWABLE at 09:11

## 2018-11-18 RX ADMIN — AMLODIPINE BESYLATE 10 MG: 5 TABLET ORAL at 09:11

## 2018-11-18 RX ADMIN — CLOPIDOGREL BISULFATE 75 MG: 75 TABLET ORAL at 09:11

## 2018-11-18 NOTE — PT/OT/SLP PROGRESS
Physical Therapy         Treatment        Dennis Patricio   MRN: [id number]     PT Received On: 18  Total Time (min): 30        Billable Minutes:  Gait [id number] and Therapeutic Exercise 20  Total Minutes: 30    Treatment Type: Treatment  PT/PTA: PTA     PTA Visit Number: 3       General Precautions: Standard, fall  Orthopedic Precautions: Orthopedic Precautions : N/A   Braces: Braces: N/A         Subjective:  Communicated with nurse Camacho prior to session.    Pain/Comfort  Pain Rating 1: 0/10  Pain Rating Post-Intervention 1: 0/10    Objective:  Patient found up in WC. Patient found with: de la cruz catheter and WC alarm    Functional Mobility:  Bed Mobility:   Supine to sit: Activity did not occur   Sit to supine: Activity did not occur   Rolling: Moderate Assistance   Scooting: Activity did not occur      Wheelchair Training:  Pt propelled Standard wheelchair x 125 feet on Level tile with  Right upper extremity and Right lower extremity with Moderate Assistance.     Additional Treatment:  Sitting exercises as follows to strengthen BLE for safe and functional mobility: 2 x 10 ea. 2# marching, LAQ R, QS/GS with BLE on mat, Ball squeeze, Hip abd with GTB, AP  Gait trainin' with RW with hand paddle on L. Patient required max assistance and WC pushed behind close due to strong left lean and backward lean with no control of RW and total lack of proprioception.    Activity Tolerance:  Patient tolerated treatment well    Patient left up in chair with call button in reach and chair alarm on.    Assessment:  Dennis Patricio is a 68 y.o. female with a medical diagnosis of <principal problem not specified>. She presents with R CVA L shona. Patient lacks safety awareness and does not have proprioception with ambulation or with WC mobility.    Rehab potential is fair.    Activity tolerance: Good    Discharge recommendations: Discharge Facility/Level Of Care Needs: home     Equipment recommendations:       GOALS:    Multidisciplinary Problems     Physical Therapy Goals        Problem: Physical Therapy Goal    Goal Priority Disciplines Outcome Goal Variances Interventions   Physical Therapy Goal     PT, PT/OT Ongoing (interventions implemented as appropriate)     Description:  Goals to be met by: 2018     Patient will increase functional independence with mobility by performin. Supine to sit with Minimum Assistance  2. Sit to supine with Minimum Assistance  3. Rolling to Left and Right with Minimum Assistance  4. Sit to stand transfer with Minimal Assistance  5. Bed to chair transfer with Minimal Assistance using Rolling Walker  6. Gait  x 150 feet with Minimal Assistance using Rolling Walker.   7. Wheelchair propulsion x 200 feet with Stand-by Assistance using right upper and lower extremities.  8. Ascend/descend 4 steps with bilateral Handrails Minimal Assistance.                    PLAN:    Patient to be seen daily  to address the above listed problems via gait training, therapeutic activities, therapeutic exercises, therapeutic groups, neuromuscular re-education, wheelchair management/training  Plan of Care expires: 18  Plan of Care reviewed with: patient         Melodie IBAN Linton, PTA 2018

## 2018-11-18 NOTE — PLAN OF CARE
Problem: Patient Care Overview  Goal: Plan of Care Review  Outcome: Ongoing (interventions implemented as appropriate)  Patient alert and oriented, compliant with plan of care.  Turns and readjusts self in bed with minimal assist.  Denies pain or complaint, NAD noted, free of falls.

## 2018-11-18 NOTE — PLAN OF CARE
Problem: Patient Care Overview  Goal: Plan of Care Review  Outcome: Ongoing (interventions implemented as appropriate)  Pt alert and oriented. Watkins cath draining. Mod assist with transfers. Left arm has spastic movements. Pt in w/c in room plan of care continued. Call light in reach

## 2018-11-18 NOTE — PROGRESS NOTES
[facility]  Physical Medicine & Rehab  Progress Note    Patient Name: Dennis Patricio  MRN: [id number]  Patient Class: IP- Rehab   Admission Date: 11/9/2018  Length of Stay: 9 days  Attending Physician: Dominique Brink MD    Subjective:     Principal Problem:  CVA    Interval History: 11/18/2018:  Patient is seen for follow-up rehab evaluation and recommendations: pt is 68 y.o  Female with dx of CVA, participating with therapy    HPI, Past Medical, Family, and Social History remains the same as documented in the initial encounter.    Scheduled Medications:    amLODIPine  10 mg Oral Daily    aspirin  81 mg Oral Daily    atorvastatin  80 mg Oral QHS    clopidogrel  75 mg Oral Daily    enoxaparin  40 mg Subcutaneous Daily    hydrochlorothiazide  12.5 mg Oral Daily    lisinopril  10 mg Oral QHS    metFORMIN  1,000 mg Oral with dinner    polyethylene glycol  17 g Oral Daily    potassium chloride  8 mEq Oral Once       PRN Medications: acetaminophen, dextrose 50%, dextrose 50%, glucagon (human recombinant), glucose, glucose, insulin aspart U-100, lactulose, ondansetron, traMADol    Review of Systems   Constitutional: Negative.    HENT: Negative.    Eyes: Negative.    Respiratory: Negative.    Cardiovascular: Negative.    Gastrointestinal: Negative.    Endocrine: Negative.    Genitourinary: Negative.    Musculoskeletal: Negative.    Skin: Negative.    Allergic/Immunologic: Negative.    Neurological: Negative.    Hematological: Negative.    Psychiatric/Behavioral: Negative.      Objective:     Vital Signs (Most Recent):  Temp: 98.3 °F (36.8 °C) (11/18/18 0554)  Pulse: 72 (11/18/18 0554)  Resp: 18 (11/18/18 0554)  BP: (!) 169/78 (11/18/18 0554)  SpO2: 100 % (11/18/18 0554)    Vital Signs (24h Range):  Temp:  [98.3 °F (36.8 °C)-98.5 °F (36.9 °C)] 98.3 °F (36.8 °C)  Pulse:  [72-97] 72  Resp:  [18] 18  SpO2:  [100 %] 100 %  BP: (131-169)/(66-78) 169/78     Physical Exam   Constitutional: She is  oriented to person, place, and time. She appears well-developed and well-nourished. No distress.   HENT:   Head: Normocephalic and atraumatic.   Right Ear: External ear normal.   Left Ear: External ear normal.   Nose: Nose normal.   Mouth/Throat: Oropharynx is clear and moist. No oropharyngeal exudate.   Eyes: Conjunctivae and EOM are normal. Pupils are equal, round, and reactive to light. Right eye exhibits no discharge. Left eye exhibits no discharge. No scleral icterus.   Neck: Normal range of motion. Neck supple. No JVD present. No tracheal deviation present. No thyromegaly present.   Cardiovascular: Normal rate, regular rhythm, normal heart sounds and intact distal pulses. Exam reveals no gallop and no friction rub.   No murmur heard.  Pulmonary/Chest: Effort normal and breath sounds normal. No stridor. No respiratory distress. She has no wheezes. She has no rales. She exhibits no tenderness.   Abdominal: Soft. Bowel sounds are normal. She exhibits no distension. There is no tenderness. There is no rebound and no guarding.   Genitourinary:   Genitourinary Comments: deferred   Musculoskeletal: Normal range of motion. She exhibits no edema, tenderness or deformity.   Lymphadenopathy:     She has no cervical adenopathy.   Neurological: She is alert and oriented to person, place, and time. No cranial nerve deficit. She exhibits normal muscle tone. Coordination normal.   Skin: No rash noted. She is not diaphoretic. No erythema. No pallor.   Psychiatric: She has a normal mood and affect. Her behavior is normal.     NEUROLOGICAL EXAMINATION:     MENTAL STATUS   Oriented to person, place, and time.     CRANIAL NERVES     CN III, IV, VI   Pupils are equal, round, and reactive to light.  Extraocular motions are normal.       Assessment/Plan:      Dennis Patricio is a 68 y.o. female admitted to inpatient rehabilitation on 11/9/2018 for <principal problem not specified> with impaired mobility and ADLs. Patient remains  appropriate for PT, OT, and as required Speech therapy. Patient continues to require 24 hour nursing care as well as daily Physician assessment.    Active Diagnoses:    Diagnosis Date Noted POA    CVA (cerebral vascular accident) [I63.9] 11/09/2018 Yes      Problems Resolved During this Admission:   CVA, cont therapy  HTN, vitals noted, cont current med, add HCTZ  Anticoagulation, cont asa & plavix  DM, accu check noted, cont current med      DISCHARGE PLANNING:  Tentative Discharge Date:     No future appointments.    Moi Byers MD  Department of Physical Medicine & Rehab  [facility]

## 2018-11-19 LAB — POCT GLUCOSE: 169 MG/DL (ref 70–110)

## 2018-11-19 PROCEDURE — 92507 TX SP LANG VOICE COMM INDIV: CPT

## 2018-11-19 PROCEDURE — 97535 SELF CARE MNGMENT TRAINING: CPT

## 2018-11-19 PROCEDURE — 12800000 HC REHAB SEMI-PRIVATE ROOM

## 2018-11-19 PROCEDURE — 97112 NEUROMUSCULAR REEDUCATION: CPT

## 2018-11-19 PROCEDURE — 97127 HC THERAPEUTIC INTVTN, COGN FUNCTION - ST: CPT

## 2018-11-19 PROCEDURE — 97542 WHEELCHAIR MNGMENT TRAINING: CPT

## 2018-11-19 PROCEDURE — 25000003 PHARM REV CODE 250: Performed by: PHYSICAL MEDICINE & REHABILITATION

## 2018-11-19 PROCEDURE — 97803 MED NUTRITION INDIV SUBSEQ: CPT

## 2018-11-19 PROCEDURE — 97530 THERAPEUTIC ACTIVITIES: CPT

## 2018-11-19 PROCEDURE — 63600175 PHARM REV CODE 636 W HCPCS: Performed by: PHYSICAL MEDICINE & REHABILITATION

## 2018-11-19 RX ADMIN — METFORMIN HYDROCHLORIDE 1000 MG: 500 TABLET, EXTENDED RELEASE ORAL at 05:11

## 2018-11-19 RX ADMIN — HYDROCHLOROTHIAZIDE 12.5 MG: 12.5 CAPSULE ORAL at 09:11

## 2018-11-19 RX ADMIN — CLOPIDOGREL BISULFATE 75 MG: 75 TABLET ORAL at 09:11

## 2018-11-19 RX ADMIN — ATORVASTATIN CALCIUM 80 MG: 40 TABLET, FILM COATED ORAL at 09:11

## 2018-11-19 RX ADMIN — ENOXAPARIN SODIUM 40 MG: 100 INJECTION SUBCUTANEOUS at 05:11

## 2018-11-19 RX ADMIN — LISINOPRIL 10 MG: 10 TABLET ORAL at 09:11

## 2018-11-19 RX ADMIN — ASPIRIN 81 MG CHEWABLE TABLET 81 MG: 81 TABLET CHEWABLE at 09:11

## 2018-11-19 RX ADMIN — POLYETHYLENE GLYCOL 3350 17 G: 17 POWDER, FOR SOLUTION ORAL at 09:11

## 2018-11-19 RX ADMIN — AMLODIPINE BESYLATE 10 MG: 5 TABLET ORAL at 09:11

## 2018-11-19 NOTE — PLAN OF CARE
Problem: Nutrition, Imbalanced: Inadequate Oral Intake (Adult)  Goal: Improved Oral Intake  Patient will demonstrate the desired outcomes by discharge/transition of care.  Outcome: Ongoing (interventions implemented as appropriate)  Pt encouraged to drink more water. Watkins removed today. Will continue to monitor.

## 2018-11-19 NOTE — PLAN OF CARE
Problem: SLP Goal  Goal: SLP Goal  1.  Patient will name common objects with SB assist  2.  Patient will complete visuospatial tasks with MOD assist  3.  Patient will recall 3/3 related objects with MOD assist for use of short term memory strategies  4.  Patient will require MIN assist to name 10 objects in a simple category  5.  Patient will perform simple/moderate problem solving with MIN assist   Outcome: Ongoing (interventions implemented as appropriate)  1. Continues to struggle to identify pictures, even with reading glasses & cues to close one eye  3. After review of memory strategies, recalled 4/5 unrelated words immediately but only 1/5 (MoCA administration.  Given 3 unrelated words, pt immediately recalled all, & recalled 3/3 given one cue after filled delay  4. Pt named 15 personal care items, 7 refrigerator items,  & 7 pieces of clothing indep in one minute.

## 2018-11-19 NOTE — PLAN OF CARE
Problem: Occupational Therapy Goal  Goal: Occupational Therapy Goal  Goals to be met by:     Patient will increase functional independence with ADLs by performing:    Feeding with Modified [place].  UE Dressing with Set-up Assistance.  LE Dressing with Stand-by Assistance.  Grooming while seated with Set-up Assistance.   Shower transfer to TTB or shower chair with Beth and wall bar.  Bathing from  shower chair/bench with Minimal Assistance.  Toileting from bedside commode over toilet with Minimal Assistance for hygiene and clothing management.  Toilet transfer to bedside commode over toilet with Contact Guard Assistance.   Sitting at edge of bed x15 minutes with Stand-by Assistance while participating in ADL or exercise.   Outcome: Ongoing (interventions implemented as appropriate)  OT for self care and NME.

## 2018-11-19 NOTE — PLAN OF CARE
Problem: Patient Care Overview  Goal: Plan of Care Review  Outcome: Ongoing (interventions implemented as appropriate)  Patient alert and oriented.  Compliant with plan of care, noted strengthening.  Denies pain or complaint, NAD noted, free of falls.

## 2018-11-19 NOTE — PT/OT/SLP PROGRESS
Occupational Therapy  Treatment    Dennis Patricio   MRN: [id number]   Admitting Diagnosis: New onset right occipital parietal acute ischemic infarct with left hemiparesis.     OT Date of Treatment: 11/19/18   Total Time (min): 82 min      Billable Minutes:  Self Care/Home Management 35 and Neuromuscular Re-education 47  Total Minutes: 82    General Precautions: Standard, diabetic, fall, vision impaired  Orthopedic Precautions: N/A  Braces: N/A    Do you have any cultural, spiritual, Jewish conflicts, given your current situation?: none    Subjective:  Communicated with nurse prior to session.  Nurse reported de la cruz removed today.    Pain Rating 1: 3/10  Location - Side 1: Left  Location - Orientation 1: generalized  Location 1: groin  Pain Addressed 1: Cessation of Activity  Pain Rating Post-Intervention 1: 0/10(Pt reported L groin pain when RLE was crossed over the L during LB dressing.)    Objective:   Feeding: SBA with pt using the RUE to cut foods with her right hand.  She was unable to safely engage the LUE in bilateral tasks.    Additional Treatment:  Pt seen for NME.  She was positioned with a long mirror at midline sitting in w/c and followed instructions to move the RUE and view the image moving in the mirror and think of the mirror image as her LUE moving simultaneously with her RUE.  Pt engaged in placement tasks with LUE unilaterally and bilaterally.   Pt engaged in R and L UE and LE discrimination exercises.                                Patient left up in chair with call button in reach and chair alarm on.    ASSESSMENT:  Dennis Patricio required repeated instructions and performance to understand the concept of mirror therapy as a tx technique to facilitate controlled movement of the LUE.  Following tx, she was able to then move the LUE with greater accuracy in simultaneous movement into bilateral UE flexion and horizontal add and abduction by visual assessment. Pt completed R and L  discrimination exercises with 80% accuracy.  She was hesitant at times in responding and self corrected at times.      Rehab potential is good    Activity tolerance: Good    Discharge recommendations: (TBD)     Equipment recommendations: (TBD)     GOALS:   Multidisciplinary Problems     Occupational Therapy Goals        Problem: Occupational Therapy Goal    Goal Priority Disciplines Outcome Interventions   Occupational Therapy Goal     OT, PT/OT Ongoing (interventions implemented as appropriate)    Description:  Goals to be met by:     Patient will increase functional independence with ADLs by performing:    Feeding with Modified [place].  UE Dressing with Set-up Assistance.  LE Dressing with Stand-by Assistance.  Grooming while seated with Set-up Assistance.   Shower transfer to TTB or shower chair with Beth and wall bar.  Bathing from  shower chair/bench with Minimal Assistance.  Toileting from bedside commode over toilet with Minimal Assistance for hygiene and clothing management.  Toilet transfer to bedside commode over toilet with Contact Guard Assistance.   Sitting at edge of bed x15 minutes with Stand-by Assistance while participating in ADL or exercise.                    Plan:  Patient to be seen 6 x/week to address the above listed problems via community/work re-entry, self-care/home management, therapeutic activities, therapeutic exercises, therapeutic groups, neuromuscular re-education, cognitive retraining, sensory integration, wheelchair management/training  Plan of Care expires: 11/29/18  Plan of Care reviewed with: patient         Caterina Navas OT  11/19/2018

## 2018-11-19 NOTE — PT/OT/SLP PROGRESS
Physical Therapy         Treatment        Dennis Patricio   MRN: [id number]     PT Received On: 11/19/18  Total Time (min): 75        Billable Minutes:  Therapeutic Activity 10, Neuromuscular Re-education 52 and Train/Wheelchair Management 13  Total Minutes: 75    Treatment Type: Treatment  PT/PTA: PT     PTA Visit Number: 3       General Precautions: Standard, diabetic, fall, vision impaired  Orthopedic Precautions: Orthopedic Precautions : N/A   Braces: Braces: N/A         Subjective:  Communicated with nurse Camacho prior to session.    Pain/Comfort  Pain Rating 1: 0/10  Pain Rating Post-Intervention 1: 0/10    Objective:  Patient found sitting up in w/c in room.  Patient found with: de la cruz catheter    Functional Mobility:  Balance:   Static Sit: GOOD-: Takes MODERATE challenges from all directions but inconsistently  Dynamic Sit:  FAIR+: Maintains balance through MINIMAL excursions of active trunk motion  Static Stand: POOR: Needs MODERATE assist to maintain  Dynamic stand: 0: N/A    Transfer Training:  Sit to stand:Moderate Assistance with // bars plus vc for transition of COM forward over her feet.    Wheelchair Training:  Pt propelled Standard wheelchair x 190 feet on Level tile with  Right upper extremity and Right lower extremity with Minimal Assistance for steering/to avoid obstacles.     Additional Treatment:  SciFit stepper x 7 min on level 1    Neuro Re-ed: Static standing balance and wt shifting on level floor and on foam with use of mirror for visual input with Moderate A for balance and max vc in attempt to achieve midline orientation and control.  AROM B LE with 3# wt for strengthening R LE & for improved motor control L LE with vc for proper execution and joint protection: DF/PF, hip flex, knee ext, hip abd/add, ham curls GTB, Eriberto's L LE for towel slides and ball slides.    Activity Tolerance:  Patient tolerated treatment well    Patient left up in chair with call button in reach, chair alarm on  and nurse notified.    Assessment:  Dennis Patricio is a 68 y.o. female with a medical diagnosis of new onset right occipital parietal acute ischemic infarct with left hemiparesis. She presents with decreased strength, decreased functional activity tolerance, impaired balance, and absent sensation to light touch on left upper and lower extremities.. Pt exhibits inattention to her left side due to a left visual field cut. Pt also demonstrates impaired righting reactions; pt is aware of midline in sitting and begins to correct herself, but is not aware of midline when standing. Pt required maximum level of assistance mainly due to balance impairments because pt unable to feel her limb on the ground when standing. Pt to benefit from skilled PT rehab services to address functional impairments.        Rehab potential is fair.    Activity tolerance: Fair    Discharge recommendations:       Equipment recommendations:       GOALS:   Multidisciplinary Problems     Physical Therapy Goals        Problem: Physical Therapy Goal    Goal Priority Disciplines Outcome Goal Variances Interventions   Physical Therapy Goal     PT, PT/OT Ongoing (interventions implemented as appropriate)     Description:  Goals to be met by: 2018     Patient will increase functional independence with mobility by performin. Supine to sit with Minimum Assistance  2. Sit to supine with Minimum Assistance  3. Rolling to Left and Right with Minimum Assistance  4. Sit to stand transfer with Minimal Assistance  5. Bed to chair transfer with Minimal Assistance using Rolling Walker  6. Gait  x 150 feet with Minimal Assistance using Rolling Walker.   7. Wheelchair propulsion x 200 feet with Stand-by Assistance using right upper and lower extremities.  8. Ascend/descend 4 steps with bilateral Handrails Minimal Assistance.                    PLAN:    Patient to be seen daily  to address the above listed problems via gait training, therapeutic  activities, therapeutic exercises, therapeutic groups, neuromuscular re-education, wheelchair management/training  Plan of Care expires: 11/29/18  Plan of Care reviewed with: patient    I certify that I was present in the room directing the student JORDI Peacock in service delivery and guiding them using my skilled judgment.          Raquel Almaguer, PT 11/19/2018

## 2018-11-19 NOTE — PT/OT/SLP PROGRESS
Speech Language Pathology Treatment          Dennis Patricio   MRN: [id number]     Diet recommendations: Solid Diet Level: Regular  Liquid Diet Level: Thin     SLP Treatment Date: 11/19/18  Speech Start Time: 1330     Speech Stop Time: 1400     Speech Total (min): 30 min       TREATMENT BILLABLE MINUTES:  Speech Therapy Individual 30    General Precautions: Standard, diabetic, fall, vision impaired  Current Respiratory Status: room air       Subjective:  My date to go home is the 29th.      Objective:   Patient found in wheelchair, just finished OT session. Alert & cooperative, continues to struggle with vision.  Patient found with: (chair alarm).  MoCA administered with essentially the same score as last week.     Assessment:  Dennis Patricio is a 68 y.o. female with a SLP diagnosis of Cognitive-Linguistic Impairment. She continues to be cooperative & pleasant, but MoCA scores indicate no significant change from moderate cognitive-linguistic deficit.    Discharge recommendations: Discharge Facility/Level Of Care Needs: home     Goals:   Multidisciplinary Problems     SLP Goals        Problem: SLP Goal    Goal Priority Disciplines Outcome   SLP Goal     SLP Ongoing (interventions implemented as appropriate)   Description:  1.  Patient will name common objects with SB assist  2.  Patient will complete visuospatial tasks with MOD assist  3.  Patient will recall 3/3 related objects with MOD assist for use of short term memory strategies  4.  Patient will require MIN assist to name 10 objects in a simple category  5.  Patient will perform simple/moderate problem solving with MIN assist                     Plan:   Patient to be seen Therapy Frequency: 5 x/week   Planned Interventions: Cognitive-Linguistic Therapy  Plan of Care Expires: 11/26/18  Plan of Care reviewed with: patient  SLP Follow-up?: Yes  SLP - Next Visit Date: 11/20/18           [place] Jarrett CCC-SLP/A 11/19/2018

## 2018-11-19 NOTE — PLAN OF CARE
Problem: Physical Therapy Goal  Goal: Physical Therapy Goal  Goals to be met by: 2018     Patient will increase functional independence with mobility by performin. Supine to sit with Minimum Assistance  2. Sit to supine with Minimum Assistance  3. Rolling to Left and Right with Minimum Assistance  4. Sit to stand transfer with Minimal Assistance  5. Bed to chair transfer with Minimal Assistance using Rolling Walker  6. Gait  x 150 feet with Minimal Assistance using Rolling Walker.   7. Wheelchair propulsion x 200 feet with Stand-by Assistance using right upper and lower extremities.  8. Ascend/descend 4 steps with bilateral Handrails Minimal Assistance.   Outcome: Ongoing (interventions implemented as appropriate)    PT for neuro re-ed, w/c and activity.

## 2018-11-19 NOTE — PLAN OF CARE
Problem: Nutrition, Imbalanced: Inadequate Oral Intake (Adult)  Goal: Improved Oral Intake  Patient will demonstrate the desired outcomes by discharge/transition of care.  Recommendation: 1)  Continue consistent carbohydrate, cardiac diet and recommend adding 1800 calorie 2) Consider checking pt's a1C 2/2 no evidence of a1C per chart review and pt is on metformin with POCT checks 3) Add Boost Glucose Control, BID  Intervention:  Modified carbohydrate diet   Goals: 1) Pt will consume >=75% meals during admit  Nutrition Goal Status: continues  Communication of RD Recs: (POC, sticky note, reviewed with patient food services)

## 2018-11-19 NOTE — CONSULTS
"  [facility]  Adult Nutrition  Consult Note    SUMMARY     Recommendations    Recommendation: 1)  Continue consistent carbohydrate, cardiac diet and recommend adding 1800 calorie 2) Consider checking pt's a1C 2/2 no evidence of a1C per chart review and pt is on metformin with POCT checks 3) Add Boost Glucose Control, BID  Intervention:  Modified carbohydrate diet   Goals: 1) Pt will consume >=75% meals during admit  Nutrition Goal Status: continues  Communication of RD Recs: (POC, sticky note, reviewed with patient food services)    Reason for Assessment    Reason for Assessment: RD follow up  Diagnosis: stroke/CVA  Relevant Medical History: DM2, HTN, Chronic back pain, arthritis  Interdisciplinary Rounds: attended  General Information Comments: Admitted with CVA, left hemiparesis.  Pt in therapy. No weight hx >=1 year. NFPE to be completed at follow up.  Meal intake reflects excellent appetite. (mini mental score per SLP is 14/30).  11/19/18: Pt in therapy. Spoke with nursing. Reports pt's appetite is not as good.  Noted pt ate an egg salad sandwich well last night and family brings some food "here and there".  Weight loss noted since admit, but from observation of pt in therapy, has layers of clothing including a hat.  Requesting another weight.    Nutrition Discharge Planning: consisent carbohydrate, cardiac diet     Nutrition Risk Screen    Nutrition Risk Screen: no indicators present    Nutrition/Diet History    Do you have any cultural, spiritual, [employer]ar[employer] conflicts, given your current situation?: none  Food Allergies: NKFA    Anthropometrics    Temp: 99.2 °F (37.3 °C)  Height Method: Stated  Height: 5' 8"  Height (inches): 68 in  Weight Method: Bed Scale  Weight: 78.5 kg (173 lb)  Weight (lb): 173 lb  Ideal Body Weight (IBW), Female: 140 lb  % Ideal Body Weight, Female (lb): 126.29 lb  BMI (Calculated): 26.9  BMI Grade: 25 - 29.9 - overweight       Lab/Procedures/Meds    Pertinent Labs " Reviewed: reviewed  BMP  Lab Results   Component Value Date     11/14/2018    K 4.6 11/14/2018     11/14/2018    CO2 29 11/14/2018    BUN 14 11/14/2018    CREATININE 0.8 11/14/2018    CALCIUM 9.8 11/14/2018    ANIONGAP 6 (L) 11/14/2018    ESTGFRAFRICA >60 11/14/2018    EGFRNONAA >60 11/14/2018   2/2 renal issues  No results found for: LABA1C, HGBA1C  Recent Labs   Lab 11/19/18  0521   POCTGLUCOSE 169*   2/2 DM    Pertinent Medications Reviewed: reviewed  Pertinent Medications Comments: metformin, asa, statin    Physical Findings/Assessment    Overall Physical Appearance: overweight(per BMI)  Oral/Mouth Cavity: tooth/teeth missing  Skin: (García score 18)    Estimated/Assessed Needs    Weight Used For Calorie Calculations: 80.2 kg (176 lb 12.9 oz)  Energy Calorie Requirements (kcal): 1732  Energy Need Method: [place]-St Jeor  Protein Requirements: 80-96  Weight Used For Protein Calculations: 80.2 kg (176 lb 12.9 oz)     Fluid Need Method: RDA Method(or per MD)  RDA Method (mL): 1732  CHO Requirement: 45-50% EEN      Nutrition Prescription Ordered    Current Diet Order: consistent carbohydrate, cardiac  Nutrition Order Comments: Came in with abnormal renal labs and was on  renal diet, Now resolved and renal diet discontinued.     Evaluation of Received Nutrient/Fluid Intake    Energy Calories Required: not meeting needs  Protein Required: not meeting needs  Fluid Required: not meeting needs  % Intake of Estimated Energy Needs: 50-75%  % Meal Intake: 50-75%    Nutrition Risk    Level of Risk/Frequency of Follow-up: (2 x wkly (2/2 need for NFPE))     Assessment and Plan     Altered nutrition related lab values r/t altered carbohydrate metabolism as evidenced by glucose of 265 with diagnosis of diabetes.    Monitor and Evaluation    Food and Nutrient Intake: energy intake  Food and Nutrient Adminstration: diet order  Anthropometric Measurements: weight, weight change  Biochemical Data, Medical Tests and  Procedures: electrolyte and renal panel, inflammatory profile, glucose/endocrine profile  Nutrition-Focused Physical Findings: overall appearance, skin     Nutrition Follow-Up  yes  RD Follow-up?: Yes

## 2018-11-20 LAB
BACTERIA #/AREA URNS HPF: ABNORMAL /HPF
BILIRUB UR QL STRIP: NEGATIVE
CLARITY UR: ABNORMAL
COLOR UR: YELLOW
GLUCOSE UR QL STRIP: NEGATIVE
HGB UR QL STRIP: ABNORMAL
HYALINE CASTS #/AREA URNS LPF: 0 /LPF
KETONES UR QL STRIP: NEGATIVE
LEUKOCYTE ESTERASE UR QL STRIP: ABNORMAL
MICROSCOPIC COMMENT: ABNORMAL
NITRITE UR QL STRIP: NEGATIVE
PH UR STRIP: 6 [PH] (ref 5–8)
POCT GLUCOSE: 200 MG/DL (ref 70–110)
PROT UR QL STRIP: ABNORMAL
RBC #/AREA URNS HPF: 17 /HPF (ref 0–4)
SP GR UR STRIP: 1.02 (ref 1–1.03)
SQUAMOUS #/AREA URNS HPF: 6 /HPF
URN SPEC COLLECT METH UR: ABNORMAL
UROBILINOGEN UR STRIP-ACNC: NEGATIVE EU/DL
WBC #/AREA URNS HPF: >100 /HPF (ref 0–5)
YEAST URNS QL MICRO: ABNORMAL

## 2018-11-20 PROCEDURE — 97127 HC THERAPEUTIC INTVTN, COGN FUNCTION - ST: CPT

## 2018-11-20 PROCEDURE — 81000 URINALYSIS NONAUTO W/SCOPE: CPT

## 2018-11-20 PROCEDURE — 97112 NEUROMUSCULAR REEDUCATION: CPT

## 2018-11-20 PROCEDURE — 97116 GAIT TRAINING THERAPY: CPT

## 2018-11-20 PROCEDURE — 97110 THERAPEUTIC EXERCISES: CPT

## 2018-11-20 PROCEDURE — 97530 THERAPEUTIC ACTIVITIES: CPT

## 2018-11-20 PROCEDURE — 25000003 PHARM REV CODE 250: Performed by: PHYSICAL MEDICINE & REHABILITATION

## 2018-11-20 PROCEDURE — 12800000 HC REHAB SEMI-PRIVATE ROOM

## 2018-11-20 PROCEDURE — 63600175 PHARM REV CODE 636 W HCPCS: Performed by: PHYSICAL MEDICINE & REHABILITATION

## 2018-11-20 PROCEDURE — 97542 WHEELCHAIR MNGMENT TRAINING: CPT

## 2018-11-20 PROCEDURE — 87086 URINE CULTURE/COLONY COUNT: CPT

## 2018-11-20 PROCEDURE — 97535 SELF CARE MNGMENT TRAINING: CPT

## 2018-11-20 PROCEDURE — 92507 TX SP LANG VOICE COMM INDIV: CPT

## 2018-11-20 RX ORDER — CIPROFLOXACIN 250 MG/1
500 TABLET, FILM COATED ORAL EVERY 12 HOURS
Status: DISCONTINUED | OUTPATIENT
Start: 2018-11-20 | End: 2018-11-23

## 2018-11-20 RX ORDER — BETHANECHOL CHLORIDE 25 MG/1
25 TABLET ORAL 3 TIMES DAILY
Status: DISCONTINUED | OUTPATIENT
Start: 2018-11-20 | End: 2018-11-21

## 2018-11-20 RX ORDER — GLIPIZIDE 2.5 MG/1
2.5 TABLET, EXTENDED RELEASE ORAL
Status: DISCONTINUED | OUTPATIENT
Start: 2018-11-20 | End: 2018-11-29

## 2018-11-20 RX ADMIN — LISINOPRIL 10 MG: 10 TABLET ORAL at 08:11

## 2018-11-20 RX ADMIN — AMLODIPINE BESYLATE 10 MG: 5 TABLET ORAL at 09:11

## 2018-11-20 RX ADMIN — ENOXAPARIN SODIUM 40 MG: 100 INJECTION SUBCUTANEOUS at 04:11

## 2018-11-20 RX ADMIN — BETHANECHOL CHLORIDE 25 MG: 25 TABLET ORAL at 09:11

## 2018-11-20 RX ADMIN — ASPIRIN 81 MG CHEWABLE TABLET 81 MG: 81 TABLET CHEWABLE at 09:11

## 2018-11-20 RX ADMIN — CIPROFLOXACIN HYDROCHLORIDE 500 MG: 250 TABLET, FILM COATED ORAL at 08:11

## 2018-11-20 RX ADMIN — ATORVASTATIN CALCIUM 80 MG: 40 TABLET, FILM COATED ORAL at 08:11

## 2018-11-20 RX ADMIN — BETHANECHOL CHLORIDE 25 MG: 25 TABLET ORAL at 03:11

## 2018-11-20 RX ADMIN — POLYETHYLENE GLYCOL 3350 17 G: 17 POWDER, FOR SOLUTION ORAL at 09:11

## 2018-11-20 RX ADMIN — HYDROCHLOROTHIAZIDE 12.5 MG: 12.5 CAPSULE ORAL at 09:11

## 2018-11-20 RX ADMIN — CLOPIDOGREL BISULFATE 75 MG: 75 TABLET ORAL at 09:11

## 2018-11-20 RX ADMIN — METFORMIN HYDROCHLORIDE 1000 MG: 500 TABLET, EXTENDED RELEASE ORAL at 04:11

## 2018-11-20 RX ADMIN — BETHANECHOL CHLORIDE 25 MG: 25 TABLET ORAL at 08:11

## 2018-11-20 NOTE — PT/OT/SLP PROGRESS
Physical Therapy         Treatment        Dennis Patricio   MRN: [id number]     PT Received On: 11/20/18  Total Time (min): 75        Billable Minutes:  Gait [id number], Therapeutic Activity 5, Therapeutic Exercise 35, Neuromuscular Re-education 15 and Train/Wheelchair Management 10  Total Minutes: 75    Treatment Type: Treatment  PT/PTA: PT(with SPT)     PTA Visit Number: 3       General Precautions: Standard, fall, vision impaired  Orthopedic Precautions: Orthopedic Precautions : N/A   Braces: Braces: N/A     Subjective:  Communicated with RNDoug, prior to session.    Pain/Comfort  Pain Rating 1: 0/10    Objective:  Patient found seated in wheelchair, with  chair alarm.    Balance:   Static Stand: POOR: Needs MODERATE assist to maintain  Dynamic stand: POOR: Needs MOD (moderate) assist during gait    Transfer Training:  Sit to stand: Minimal Assistance in // bars and Minimum to Moderate Assistance with Raza-walker.     Wheelchair Training:  Pt propelled Standard wheelchair x 190 feet on Level tile with  Right upper extremity and Right lower extremity with Minimal Assistance for steering/to avoid obstacles.     Gait Training:  Patient gait trained FWB/WBAT: bilateral lower extremity 2 trials of 8 feet in parallel bars with Moderate Assistance and 35  feet on level tile with Hemiwalker with Moderate assistance of 1 for balance and Minimum Assistance/CGA of 1 for safety.  Pt demonstrating a  3-point gait with increased time in double stance and decreased weight-shifting ability. Impairments contributing to gait deviations include impaired balance, impaired postural control and impaired sensory feedback.    Additional Treatment:  SciFit stepper x 5 min on level 1     Neuro Re-ed: Static standing balance and wt shifting on foam with use of mirror for visual input with Minimum to Moderate Assistance for balance and moderate vc in attempt to achieve midline orientation and control.    AROM B LE with 3# wt for  strengthening R LE & for improved motor control L LE with vc for proper execution and joint protection: DF/PF, hip flex, knee ext, hip abd with RTB, hip add against ball, ham curls with RTB, Eriberto's L LE for ball slides.     Activity Tolerance:  Patient tolerated treatment well and Patient limited by fatigue    Patient left up in chair with call button in reach and chair alarm on.    Assessment:  Dennis Patricio is a 68 y.o. female with a medical diagnosis of new onset right occipital parietal acute ischemic infarct with left hemiparesis. She presents with decreased strength, decreased functional activity tolerance, impaired balance, and absent sensation to light touch on left upper and lower extremities. Pt exhibits inattention to her left side due to a left visual field cut. Pt also demonstrates impaired righting reactions; pt is aware of midline in sitting and begins to correct herself, but is not aware of midline when standing. Pt required maximum level of assistance mainly due to balance impairments because pt unable to feel her limb on the ground when standing. Pt to benefit from skilled PT rehab services to address functional impairments.   Pt demonstrated greater control of shona-walker using R UE during gait training compared to rolling walker which required use of both UE's.       Rehab potential is fair.    Activity tolerance: Fair    Discharge recommendations: Discharge Facility/Level Of Care Needs: home, outpatient PT     Equipment recommendations: Equipment Needed After Discharge: (TBD)     GOALS:   Multidisciplinary Problems     Physical Therapy Goals        Problem: Physical Therapy Goal    Goal Priority Disciplines Outcome Goal Variances Interventions   Physical Therapy Goal     PT, PT/OT Ongoing (interventions implemented as appropriate)     Description:  Goals to be met by: 2018     Patient will increase functional independence with mobility by performin. Supine to sit with Minimum  Assistance  2. Sit to supine with Minimum Assistance  3. Rolling to Left and Right with Minimum Assistance  4. Sit to stand transfer with Minimal Assistance  5. Bed to chair transfer with Minimal Assistance using Rolling Walker  6. Gait  x 150 feet with Minimal Assistance using Rolling Walker.   7. Wheelchair propulsion x 200 feet with Stand-by Assistance using right upper and lower extremities.  8. Ascend/descend 4 steps with bilateral Handrails Minimal Assistance.                    PLAN:    Patient to be seen daily  to address the above listed problems via gait training, therapeutic activities, therapeutic exercises, therapeutic groups, neuromuscular re-education, wheelchair management/training  Plan of Care expires: 11/29/18  Plan of Care reviewed with: patient       I certify that I was present in the room directing the student, Sugey Swift, in service delivery and guiding them using my skilled judgment. As the co-signing therapist I have reviewed the students documentation and am responsible for the treatment, assessment, and plan.       Sugey Swift, SPT 11/20/2018

## 2018-11-20 NOTE — PT/OT/SLP PROGRESS
Speech Language Pathology Treatment          Dennis Patricio   MRN: [id number]     SLP Treatment Date: 11/20/18  Speech Start Time: 1100     Speech Stop Time: 1130     Speech Total (min): 30 min       TREATMENT BILLABLE MINUTES:  Speech Therapy Individual 30 and Total Time 30    General Precautions: Standard, fall  Current Respiratory Status: room air       Subjective:  Pt alert and cooperative     Objective:   Patient found upright in wheelchair w/ alarm and belt. Assisted into SLP treatment room. Reviewed goals w/in POC and introduced functional calculations presented verbally 2/2 Pt vision impairment. MAX A required for basic calculations (repetitions and cueing provided) to achieve 70% acc. Pictured steps of various ADLs; Pt required MIN A to describe pictured scenes/actions and MAX A to place items in logical sequence.      Pain/Comfort  Pain Rating 1: 0/10    Assessment:  Dennis Patricio is a 68 y.o. female with a SLP diagnosis of Cognitive-Linguistic Impairment.       Discharge recommendations: Discharge Facility/Level Of Care Needs: (TBD)     Goals:   Multidisciplinary Problems     SLP Goals        Problem: SLP Goal    Goal Priority Disciplines Outcome   SLP Goal     SLP Ongoing (interventions implemented as appropriate)   Description:  1.  Patient will name common objects with SB assist  2.  Patient will complete visuospatial tasks with MOD assist  3.  Patient will recall 3/3 related objects with MOD assist for use of short term memory strategies  4.  Patient will require MIN assist to name 10 objects in a simple category  5.  Patient will perform simple/moderate problem solving with MIN assist                     Plan:   Patient to be seen Therapy Frequency: 5 x/week   Planned Interventions: Cognitive-Linguistic Therapy  Plan of Care Expires: 11/26/18  Plan of Care reviewed with: patient  SLP Follow-up?: Yes  SLP - Next Visit Date: 11/21/18           Vasile Cook CCC-SLP 11/20/2018

## 2018-11-20 NOTE — PLAN OF CARE
Problem: Patient Care Overview  Goal: Plan of Care Review  Outcome: Ongoing (interventions implemented as appropriate)  Patient alert and oriented, compliant with care plan.  Max assist to transfer.  Moderate assist with shower.  Denies pain or complaint, NAD noted, free of falls.

## 2018-11-20 NOTE — PROGRESS NOTES
"REHAB FOLLOWUP NOTE    Dennis Patricio is a 68 y.o. female patient.    Chief Complaint:  Right occipital parietal acute ischemic infarct with left hemiparesis.    History:  68-year-old female initially presented to emergency department with complaints of blurred vision, left-sided weakness, unsteady gait, confusion that began 2 days prior to this hospitalization.  Patient was initially seen in the emergency room 2 days prior, noted to have elevated blood pressures and was discharged home with medications.  Patient was readmitted to hospital this time with  inability to get up and walk to the bathroom, felt she could not move her left leg and walk,patient was also unable to  objects with the left hand and felt heavy and numb.  Patient had a CT scan consistent with right posterior acute ischemic infarct, MRI consistent with  right occipital parietal ischemic infarct, carotids without any significant stenosis, echocardiogram with no thrombus or right to left shunt noted.  Patient has been medically stabilized and is transferred down to us for further rehabilitation.        Past Medical History:   Diagnosis Date    Arthritis     Back pain, chronic     Diabetes mellitus     New in 2018    Hypertension        Temp: 99 °F (37.2 °C) (11/20/18 0529)  Pulse: 83 (11/20/18 0529)  Resp: 20 (11/20/18 0529)  BP: 132/77 (11/20/18 0529)  SpO2: 98 % (11/20/18 0529)  Weight: 78.5 kg (173 lb) (11/18/18 0554)  Height: 5' 8" (172.7 cm) (11/15/18 1103)    Lab Results   Component Value Date    WBC 10.10 11/13/2018    HGB 12.5 11/13/2018    HCT 37.9 11/13/2018     (H) 11/13/2018    ALT 17 11/12/2018    AST 34 11/12/2018     11/14/2018    K 4.6 11/14/2018     11/14/2018    CREATININE 0.8 11/14/2018    BUN 14 11/14/2018    CO2 29 11/14/2018       Physical Examination:  Alert, oriented x3, voices no complaints.    Lungs are clear to auscultation.  Heart with regular rate and rhythm.  Bilateral lower extremities " without any edema or calf tenderness noted.  Left upper and lower extremity with decreased active to passive range of motion decreased motor strength.   Patient with urinary retention with Watkins catheter in place.  Will give a voiding trial.   Blood pressure controlled will monitor.  Blood sugars elevated would adjust medication.  Voiding on her own will check postvoid residuals.    Impression:  Status post new onset right occipital parietal acute ischemic infarct with left hemiparesis.  Acute renal failure secondary to urinary retention.  Hypertension.  Diabetes mellitus.  Degenerative joint disease.      Recommendations:  Continue current PT, OT, speech therapy and nursing care.  DC Watkins catheter and a voiding trial.      Dominique Brink MD  11/20/2018

## 2018-11-20 NOTE — PLAN OF CARE
Problem: Physical Therapy Goal  Goal: Physical Therapy Goal  Goals to be met by: 2018     Patient will increase functional independence with mobility by performin. Supine to sit with Minimum Assistance  2. Sit to supine with Minimum Assistance  3. Rolling to Left and Right with Minimum Assistance  4. Sit to stand transfer with Minimal Assistance  5. Bed to chair transfer with Minimal Assistance using Rolling Walker  6. Gait  x 150 feet with Minimal Assistance using Rolling Walker.   7. Wheelchair propulsion x 200 feet with Stand-by Assistance using right upper and lower extremities.  8. Ascend/descend 4 steps with bilateral Handrails Minimal Assistance.   Outcome: Ongoing (interventions implemented as appropriate)  PT for gait training, wheelchair mobility, therapeutic activity, therapeutic exercise and neuromuscular re-education.

## 2018-11-20 NOTE — PT/OT/SLP PROGRESS
Occupational Therapy  Treatment    Dennis Patricio   MRN: [id number]   Admitting Diagnosis:  CVA    OT Date of Treatment: 11/20/18   Total Time (min): 75 min      Billable Minutes:  Self Care/Home Management 30, Therapeutic Activity 20 and Therapeutic Exercise 25  Total Minutes: 75    General Precautions: Standard, fall  Braces: N/A    Do you have any cultural, spiritual, [employer] conflicts, given your current situation?: none    Subjective:  Communicated with nursing prior to session.    Pain/Comfort  Pain Rating 1: 0/10    Objective:   Patient was found supine in bed, transfer from bed to wheelchair completed.  Patient was supervised with toilet use, nursing present.    She was directed with self propelling her wheelchair from her room to the main gym with escort and verbal cues.  She was positioned at seated at table side for various table top activities as follows:  BILAT FRANCISCO completed in 4 sets of 5 mins each using 4 lbs, rest breaks given.  Reach and grab done with all graded clothes pins with emphasis on LUE use.    Transfer Training:   Bed <> Chair:  Stand Pivot with Stand-by Assistance with Grab bars .  Toilet Transfer:  Pt Stand Pivot with Stand-by Assistance with Grab bars .    Grooming:  Patient peformed hand washing with Supervision or Set-up Assistance at sitting at sink.    Patient left up in chair with all lines intact, call button in reach, chair alarm on and nursing notified    ASSESSMENT:  Dennis Patricio is a 68 y.o. female with a medical diagnosis of  CVA and presents with decreased LUE use/control.    Rehab potential is excellent    Activity tolerance: Good      GOALS:   Multidisciplinary Problems     Occupational Therapy Goals        Problem: Occupational Therapy Goal    Goal Priority Disciplines Outcome Interventions   Occupational Therapy Goal     OT, PT/OT Ongoing (interventions implemented as appropriate)    Description:  Goals to be met by:     Patient will increase functional  independence with ADLs by performing:    Feeding with Modified [place].  UE Dressing with Set-up Assistance.  LE Dressing with Stand-by Assistance.  Grooming while seated with Set-up Assistance.   Shower transfer to TTB or shower chair with Beth and wall bar.  Bathing from  shower chair/bench with Minimal Assistance.  Toileting from bedside commode over toilet with Minimal Assistance for hygiene and clothing management.  Toilet transfer to bedside commode over toilet with Contact Guard Assistance.   Sitting at edge of bed x15 minutes with Stand-by Assistance while participating in ADL or exercise.                    Plan:  Patient to be seen 6 x/week to address the above listed problems via community/work re-entry, self-care/home management, therapeutic activities, therapeutic exercises, therapeutic groups, neuromuscular re-education, cognitive retraining, sensory integration, wheelchair management/training  Plan of Care expires: 11/29/18  Plan of Care reviewed with: patient         Rc THOMPSON Villegas  11/20/2018

## 2018-11-21 LAB
ANION GAP SERPL CALC-SCNC: 10 MMOL/L
BUN SERPL-MCNC: 12 MG/DL
CALCIUM SERPL-MCNC: 9.7 MG/DL
CHLORIDE SERPL-SCNC: 103 MMOL/L
CO2 SERPL-SCNC: 24 MMOL/L
CREAT SERPL-MCNC: 0.8 MG/DL
EST. GFR  (AFRICAN AMERICAN): >60 ML/MIN/1.73 M^2
EST. GFR  (NON AFRICAN AMERICAN): >60 ML/MIN/1.73 M^2
GLUCOSE SERPL-MCNC: 139 MG/DL
POCT GLUCOSE: 145 MG/DL (ref 70–110)
POTASSIUM SERPL-SCNC: 3.9 MMOL/L
SODIUM SERPL-SCNC: 137 MMOL/L

## 2018-11-21 PROCEDURE — 63600175 PHARM REV CODE 636 W HCPCS: Performed by: PHYSICAL MEDICINE & REHABILITATION

## 2018-11-21 PROCEDURE — 97127 HC THERAPEUTIC INTVTN, COGN FUNCTION - ST: CPT

## 2018-11-21 PROCEDURE — 36415 COLL VENOUS BLD VENIPUNCTURE: CPT

## 2018-11-21 PROCEDURE — 12800000 HC REHAB SEMI-PRIVATE ROOM

## 2018-11-21 PROCEDURE — 92507 TX SP LANG VOICE COMM INDIV: CPT

## 2018-11-21 PROCEDURE — 97112 NEUROMUSCULAR REEDUCATION: CPT

## 2018-11-21 PROCEDURE — 97803 MED NUTRITION INDIV SUBSEQ: CPT

## 2018-11-21 PROCEDURE — 25000003 PHARM REV CODE 250: Performed by: PHYSICAL MEDICINE & REHABILITATION

## 2018-11-21 PROCEDURE — 80048 BASIC METABOLIC PNL TOTAL CA: CPT

## 2018-11-21 PROCEDURE — 97116 GAIT TRAINING THERAPY: CPT

## 2018-11-21 PROCEDURE — 97530 THERAPEUTIC ACTIVITIES: CPT

## 2018-11-21 PROCEDURE — 97535 SELF CARE MNGMENT TRAINING: CPT

## 2018-11-21 PROCEDURE — 97542 WHEELCHAIR MNGMENT TRAINING: CPT

## 2018-11-21 RX ORDER — BETHANECHOL CHLORIDE 25 MG/1
50 TABLET ORAL 3 TIMES DAILY
Status: DISCONTINUED | OUTPATIENT
Start: 2018-11-21 | End: 2018-11-28

## 2018-11-21 RX ORDER — LISINOPRIL 10 MG/1
20 TABLET ORAL NIGHTLY
Status: DISCONTINUED | OUTPATIENT
Start: 2018-11-21 | End: 2018-11-29 | Stop reason: HOSPADM

## 2018-11-21 RX ADMIN — HYDROCHLOROTHIAZIDE 12.5 MG: 12.5 CAPSULE ORAL at 08:11

## 2018-11-21 RX ADMIN — GLIPIZIDE 2.5 MG: 2.5 TABLET, FILM COATED, EXTENDED RELEASE ORAL at 08:11

## 2018-11-21 RX ADMIN — POLYETHYLENE GLYCOL 3350 17 G: 17 POWDER, FOR SOLUTION ORAL at 08:11

## 2018-11-21 RX ADMIN — CLOPIDOGREL BISULFATE 75 MG: 75 TABLET ORAL at 08:11

## 2018-11-21 RX ADMIN — CIPROFLOXACIN HYDROCHLORIDE 500 MG: 250 TABLET, FILM COATED ORAL at 08:11

## 2018-11-21 RX ADMIN — ATORVASTATIN CALCIUM 80 MG: 40 TABLET, FILM COATED ORAL at 08:11

## 2018-11-21 RX ADMIN — BETHANECHOL CHLORIDE 50 MG: 25 TABLET ORAL at 04:11

## 2018-11-21 RX ADMIN — ENOXAPARIN SODIUM 40 MG: 100 INJECTION SUBCUTANEOUS at 04:11

## 2018-11-21 RX ADMIN — ONDANSETRON 4 MG: 4 TABLET, ORALLY DISINTEGRATING ORAL at 09:11

## 2018-11-21 RX ADMIN — LISINOPRIL 20 MG: 10 TABLET ORAL at 08:11

## 2018-11-21 RX ADMIN — METFORMIN HYDROCHLORIDE 1000 MG: 500 TABLET, EXTENDED RELEASE ORAL at 04:11

## 2018-11-21 RX ADMIN — BETHANECHOL CHLORIDE 50 MG: 25 TABLET ORAL at 08:11

## 2018-11-21 RX ADMIN — ASPIRIN 81 MG CHEWABLE TABLET 81 MG: 81 TABLET CHEWABLE at 08:11

## 2018-11-21 RX ADMIN — TRAMADOL HYDROCHLORIDE 50 MG: 50 TABLET, FILM COATED ORAL at 08:11

## 2018-11-21 RX ADMIN — BETHANECHOL CHLORIDE 25 MG: 25 TABLET ORAL at 08:11

## 2018-11-21 RX ADMIN — AMLODIPINE BESYLATE 10 MG: 5 TABLET ORAL at 08:11

## 2018-11-21 NOTE — PLAN OF CARE
Problem: Occupational Therapy Goal  Goal: Occupational Therapy Goal  Goals to be met by: 11/29/2018  Patient will increase functional independence with ADLs by performing:    Feeding with Modified [place].  UE Dressing with Set-up Assistance.  LE Dressing with Stand-by Assistance.  Grooming while seated with Set-up Assistance.   Shower transfer to TTB or shower chair with Beth and wall bar.  Bathing from  shower chair/bench with Minimal Assistance.  Toileting from bedside commode over toilet with Minimal Assistance for hygiene and clothing management.  Toilet transfer to bedside commode over toilet with Contact Guard Assistance.   Sitting at edge of bed x15 minutes with Stand-by Assistance while participating in ADL or exercise.   Outcome: Ongoing (interventions implemented as appropriate)  OT tx for SC and NME.

## 2018-11-21 NOTE — PT/OT/SLP PROGRESS
Speech Language Pathology Treatment          Dennis Patricio   MRN: [id number]     SLP Treatment Date: 11/21/18  Speech Start Time: 1430     Speech Stop Time: 1500     Speech Total (min): 30 min       TREATMENT BILLABLE MINUTES:  Speech Therapy Individual 30 and Total Time 30    General Precautions: Standard, diabetic, fall, vision impaired  Current Respiratory Status: room air       Subjective:  Pt alert and cooperative     Objective:   Patient found upright in wheelchair w/ alarm and belt. Reviewed strategies to enhance recall. Provided with semantically related 5 item list, Pt required MIN A to setup and ind'ly recalled immediately following establishment. 5/5 items recalled at 2 min filled delay given categorical cue and 5/5 items recalled at 10 min delay ind'ly.         Assessment:  Dennis Patricoi is a 68 y.o. female with a SLP diagnosis of Cognitive-Linguistic Impairment.       Discharge recommendations: Discharge Facility/Level Of Care Needs: home     Goals:   Multidisciplinary Problems     SLP Goals        Problem: SLP Goal    Goal Priority Disciplines Outcome   SLP Goal     SLP Ongoing (interventions implemented as appropriate)   Description:  1.  Patient will name common objects with SB assist  2.  Patient will complete visuospatial tasks with MOD assist  3.  Patient will recall 3/3 related objects with MOD assist for use of short term memory strategies  4.  Patient will require MIN assist to name 10 objects in a simple category  5.  Patient will perform simple/moderate problem solving with MIN assist                     Plan:   Patient to be seen Therapy Frequency: 5 x/week   Planned Interventions: Cognitive-Linguistic Therapy  Plan of Care Expires: 11/26/18  Plan of Care reviewed with: patient  SLP Follow-up?: Yes  SLP - Next Visit Date: 11/22/18           Vasile Cook CCC-SLP 11/21/2018

## 2018-11-21 NOTE — PLAN OF CARE
Problem: Physical Therapy Goal  Goal: Physical Therapy Goal  Goals to be met by: 2018     Patient will increase functional independence with mobility by performin. Supine to sit with Minimum Assistance  2. Sit to supine with Minimum Assistance  3. Rolling to Left and Right with Minimum Assistance  4. Sit to stand transfer with Minimal Assistance  5. Bed to chair transfer with Minimal Assistance using Rolling Walker  6. Gait  x 150 feet with Minimal Assistance using Rolling Walker.   7. Wheelchair propulsion x 200 feet with Stand-by Assistance using right upper and lower extremities.  8. Ascend/descend 4 steps with bilateral Handrails Minimal Assistance.   Outcome: Ongoing (interventions implemented as appropriate)    PT for transfers, w/c, gait and neuro re-ed.

## 2018-11-21 NOTE — PROGRESS NOTES
" [facility]  Adult Nutrition  Progress Note    SUMMARY     Intervention: carbohydrate/fat/and sodium modified diet + Nutrition supplement therapy-commercial beverage    Recommendation:   1)  Continue consistent carbohydrate, cardiac diet and (3-4 carb servings per meal)   2) Consider checking pt's a1C 2/2   3) Continue Boost Glucose Control, BID    Intervention:  Modified carbohydrate diet   Goals: 1) Pt will consume >=75% meals during admit  Nutrition Goal Status: Continues  Communication of RD Recs: (POC, sticky note, reviewed with RN)     Reason for Assessment     Reason for Assessment: RD follow up  Diagnosis: stroke/CVA  Relevant Medical History: DM2, HTN, Chronic back pain, arthritis  Interdisciplinary Rounds: attended  General Information Comments: Admitted with CVA, left hemiparesis.  Pt in therapy. No weight hx >=1 year. NFPE to be completed at follow up.  Meal intake reflects excellent appetite. (mini mental score per SLP is 14/30).  11/19/18: Pt in therapy. Spoke with nursing. Reports pt's appetite is not as good.  Noted pt ate an egg salad sandwich well last night and family brings some food "here and there".  Weight loss noted since admit, but from observation of pt in therapy, has layers of clothing including a hat.  Requesting another weight.    11/21/18 Pt continues with variable appetite but noted by RN to be drinking Boost Glucose control BID. Calorie restriction not added as pt with variable appetite, rec. 3-4 carbohydrate servings per meal. No new wt taken, reviewed with RN. Pt not available for NFPE at time of visit with perform at follow up.    Nutrition Discharge Planning: consisent carbohydrate, cardiac diet + Boost Glucose control as needed     Nutrition Risk Screen     Nutrition Risk Screen: no indicators present     Nutrition/Diet History     Do you have any cultural, spiritual, Evangelical conflicts, given your current situation?: none  Food Allergies: " "[facility]     Anthropometrics     Temp: 99.2 °F (37.3 °C)  Height Method: Stated  Height: 5' 8"  Height (inches): 68 in  Weight Method: Bed Scale  Weight: 78.5 kg (11/18 no new)  Weight (lb): 173 lb  Ideal Body Weight (IBW), Female: 140 lb  % Ideal Body Weight, Female (lb): 126.29 lb  BMI (Calculated): 26.9 Admission  BMI Grade: 25 - 29.9 - overweight     Lab/Procedures/Meds     Pertinent Labs Reviewed: reviewed  BMP  Lab Results   Component Value Date     11/21/2018    K 3.9 11/21/2018     11/21/2018    CO2 24 11/21/2018    BUN 12 11/21/2018    CREATININE 0.8 11/21/2018    CALCIUM 9.7 11/21/2018    ANIONGAP 10 11/21/2018    ESTGFRAFRICA >60 11/21/2018    EGFRNONAA >60 11/21/2018     POCT Glucose   Date Value Ref Range Status   11/21/2018 145 (H) 70 - 110 mg/dL Final   11/20/2018 200 (H) 70 - 110 mg/dL Final   11/19/2018 169 (H) 70 - 110 mg/dL Final     No results found for: LABA1C, HGBA1C             No results found for: CRP    Pertinent Medications Reviewed: reviewed  Pertinent Medications Comments: metformin, statin, hydrochlorothiazide, zofran, insulin     Physical Findings/Assessment     Overall Physical Appearance: overweight(per BMI)  Oral/Mouth Cavity: tooth/teeth missing  Skin: (García score 16)     Estimated/Assessed Needs   Admission  Weight Used For Calorie Calculations: 80.2 kg (176 lb 12.9 oz)  Energy Calorie Requirements (kcal): 1732  Energy Need Method: Carnegie-St Jeor  Protein Requirements: 80-96  Weight Used For Protein Calculations: 80.2 kg (176 lb 12.9 oz)  Fluid Need Method: RDA Method(or per MD)  RDA Method (mL): 1732  CHO Requirement: 45-50% EEN        Nutrition Prescription Ordered     Current Diet Order: consistent carbohydrate, cardiac  Nutrition Order Comments: Came in with abnormal renal labs and was on  renal diet, Now resolved and renal diet discontinued.      Evaluation of Received Nutrient/Fluid Intake     Energy Calories Required: not meeting needs  Protein Required: not " meeting needs  Fluid Required: not meeting needs  % Intake of Estimated Energy Needs: Average 50-75%  % Meal Intake: %     Nutrition Risk     Level of Risk/Frequency of Follow-up: (2 x wkly (2/2 need for NFPE))      Assessment and Plan      Altered nutrition related lab values r/t altered carbohydrate metabolism as evidenced by glucose of 265 with diagnosis of diabetes.  Continues     Monitor and Evaluation     Food and Nutrient Intake: energy intake  Food and Nutrient Adminstration: diet order  Anthropometric Measurements: weight, weight change  Biochemical Data, Medical Tests and Procedures: electrolyte and renal panel, inflammatory profile, glucose/endocrine profile  Nutrition-Focused Physical Findings: overall appearance, skin      Nutrition Follow-Up  yes  RD Follow-up?: Yes

## 2018-11-21 NOTE — PATIENT CARE CONFERENCE
Weekly Staffing Report      Date Admitted: 11/9/2018 :   Staffing Date: 11/21/2018     Patient Active Problem List   Diagnosis    CVA (cerebral vascular accident)          Team Members Present:  Physician Team Member: Dr Brink  Nursing Team Member: Alek Muro RN  Case Management Team Member: Kiesha Shah RN  PT Team Member: Raquel Almaguer PT  OT Team Member: Caterina Navas OT  SLP Team Member: Vasile OK  Other (Discipline and Name): Clare Bradford RD    Nursing  Skin: Intact  Bowel: Continent  Bladder:continent and in/out catheter  Diet: diabetic, 1800 calorie  Appetite: good   Pain Level: 0/10    Physical Therapy  Supine to Sit:  contact guard  Sit to Stand: moderate assist  Gait:  feet moderate assist Raza walker  Wheelchair Mobility: 200 feet minimal assist  Stairs: N/A      Occupational Therapy  Feeding: set up  Grooming:minimal assist  UED: minimal assist  LED: moderate assist  Bathing:moderate assist   Toileting:maximal assist  Toilet Transfer:  maximal assist  Tub Transfer:  total assist      Speech Therapy  Swallow: WFL.  MMS: 15/30  Memory: standy by assistance  Receptive Language: modified independent  Expressive Language: modified independent  Problem solving: minimal assist            Goals:  SBA to [place].      Tolerates 3 hours of therapy: Yes    Discharge Destination: Home with H/H.    Estimated Length of Stay: 11/29/18.

## 2018-11-21 NOTE — PLAN OF CARE
Problem: Patient Care Overview  Goal: Plan of Care Review  Outcome: Ongoing (interventions implemented as appropriate)  Patient encouraged to drink more water.  Remains unable to void, straight in/out caths required.  Denies pain or complaint.

## 2018-11-21 NOTE — PLAN OF CARE
Problem: Patient Care Overview  Goal: Plan of Care Review  Plan of Care Review    Comments: A&OX4. Urinary retention, In & Out caths done. Continent of bowel. Mod assist. Bed and chair alarms utilized at all times. Remained free from falls. Standard precautions maintained.

## 2018-11-21 NOTE — PT/OT/SLP PROGRESS
Occupational Therapy  Treatment    Dennis Patricio   MRN: [id number]   Admitting Diagnosis: New onset right occipital parietal acute ischemic infarct with left hemiparesis.     OT Date of Treatment: 11/21/18   Total Time (min): 85 min      Billable Minutes:  Self Care/Home Management 60 and Neuromuscular Re-education 25  Total Minutes: 85    General Precautions: Standard, diabetic, fall, vision impaired  Orthopedic Precautions: N/A  Braces:      Do you have any cultural, spiritual, Bahai conflicts, given your current situation?: none    Subjective:  Communicated with nurse prior to session.                        Objective:       Functional Mobility:  Transfer Training:   With OTR assist via gait belt and with use of the wall bar, transfer <>toilet and TTB in shower performed with modA.    Bathing:  ModA with use of wall bar for support at times in sitting.  Pt able to use the extended shower hose with Beth to SBA.  Pt provided and instructed in use of a long handle sponge for LB and back bathing.  Sponge adapted by flexing the handle to allow greater sponge contact with the back.  It was also wrapped with a towel to assist pt in self drying.  Bathing completed with modA.    UE Dressing:  Beth .  Pt requires assist to start garment over the LUE and then is able to follow instructions to don the garment with SBA.  She needs assist to identify parts of the top and place hand in correct place and verbal to visually attend to the L side to insure completeness of the task performance.    LE Dressing:  ModA .  Instruction in use of dressing stick and sock cone provided and tactile assist required at times.    Toilet Training:  ModA.  Pt required instruction and assist to reposition self to the front of the toilet and she was then able to reach for self cleaning following a bowel movement.    Balance:   Static Sit: Vik in the w/c and SBA to close supervision on TTB  Dynamic Sit:  Beth to SBA  Static Stand:  Beth  Dynamic stand:Beth to MaxA        Additional Treatment:  Pt assisted in weight bearing to the LUE while flexing forward to LEs during bathing and dressing.  Pt provided verbal and tactile instructions to manage LUE and engage it in performance of self care and functional mobility tasks.    Patient left up in chair with call button in reach and chair alarm on.    ASSESSMENT:  Dennis Patricio demonstrated improved motor control of the LUE today and was able to use it to wash and dry herself .  .    Rehab potential is good    Activity tolerance: Good    Discharge recommendations: home, outpatient OT     Equipment recommendations: (TBD)     GOALS:   Multidisciplinary Problems     Occupational Therapy Goals        Problem: Occupational Therapy Goal    Goal Priority Disciplines Outcome Interventions   Occupational Therapy Goal     OT, PT/OT Ongoing (interventions implemented as appropriate)    Description:  Goals to be met by: 11/29/2018  Patient will increase functional independence with ADLs by performing:    Feeding with Modified [place].  UE Dressing with Set-up Assistance.  LE Dressing with Stand-by Assistance.  Grooming while seated with Set-up Assistance.   Shower transfer to TTB or shower chair with Beth and wall bar.  Bathing from  shower chair/bench with Minimal Assistance.  Toileting from bedside commode over toilet with Minimal Assistance for hygiene and clothing management.  Toilet transfer to bedside commode over toilet with Contact Guard Assistance.   Sitting at edge of bed x15 minutes with Stand-by Assistance while participating in ADL or exercise.                     Plan:  Patient to be seen 6 x/week to address the above listed problems via self-care/home management, community/work re-entry, therapeutic activities, therapeutic exercises, therapeutic groups, neuromuscular re-education, cognitive retraining, sensory integration, wheelchair management/training  Plan of Care expires:  11/29/18  Plan of Care reviewed with: patient         Caterina Yosef, OT  11/22/2018

## 2018-11-21 NOTE — PROGRESS NOTES
Team conference attended and patient progress discussed.  Met with patient in room to provide an update on progress and ELOS.  Voice message left for daughter Yenni.  No questions or concerns voiced.  Will continue to assist with discharge planning.

## 2018-11-21 NOTE — PROGRESS NOTES
"REHAB FOLLOWUP NOTE    Dennis Patricio is a 68 y.o. female patient.    Chief Complaint:  Right occipital parietal acute ischemic infarct with left hemiparesis.    History:  68-year-old female initially presented to emergency department with complaints of blurred vision, left-sided weakness, unsteady gait, confusion that began 2 days prior to this hospitalization.  Patient was initially seen in the emergency room 2 days prior, noted to have elevated blood pressures and was discharged home with medications.  Patient was readmitted to hospital this time with  inability to get up and walk to the bathroom, felt she could not move her left leg and walk,patient was also unable to  objects with the left hand and felt heavy and numb.  Patient had a CT scan consistent with right posterior acute ischemic infarct, MRI consistent with  right occipital parietal ischemic infarct, carotids without any significant stenosis, echocardiogram with no thrombus or right to left shunt noted.  Patient has been medically stabilized and is transferred down to us for further rehabilitation.        Past Medical History:   Diagnosis Date    Arthritis     Back pain, chronic     Diabetes mellitus     New in 2018    Hypertension        Temp: 98.6 °F (37 °C) (11/21/18 0801)  Pulse: 86 (11/21/18 0801)  Resp: 18 (11/21/18 0801)  BP: (!) 179/84 (11/21/18 0801)  SpO2: 98 % (11/21/18 0801)  Weight: 78.5 kg (173 lb) (11/18/18 0554)  Height: 5' 8" (172.7 cm) (11/15/18 1103)    Lab Results   Component Value Date    WBC 10.10 11/13/2018    HGB 12.5 11/13/2018    HCT 37.9 11/13/2018     (H) 11/13/2018    ALT 17 11/12/2018    AST 34 11/12/2018     11/21/2018    K 3.9 11/21/2018     11/21/2018    CREATININE 0.8 11/21/2018    BUN 12 11/21/2018    CO2 24 11/21/2018       Physical Examination:  Alert, oriented x3, voices no complaints.    Lungs are clear to auscultation.  Heart with regular rate and rhythm.  Bilateral lower " extremities without any edema or calf tenderness noted.  Left upper and lower extremity with decreased active to passive range of motion decreased motor strength.   Patient with urinary retention with Watkins catheter in place.  Voided on her own last night.  Blood pressure adjust medication..  Blood sugars controlled monitor.  UTI on antibiotics.  Await culture and sensitivity.    Impression:  Status post new onset right occipital parietal acute ischemic infarct with left hemiparesis.  Acute renal failure secondary to urinary retention.  Hypertension.  Diabetes mellitus.  Degenerative joint disease.      Recommendations:  Continue current PT, OT, speech therapy and nursing care.      Dominique Brink MD  11/21/2018

## 2018-11-21 NOTE — PT/OT/SLP PROGRESS
Physical Therapy         Treatment        Dennis Patricio   MRN: [id number]     PT Received On: 11/21/18  Total Time (min): 75        Billable Minutes:  Gait [id number], Therapeutic Activity 20, Neuromuscular Re-education 30 and Train/Wheelchair Management 10  Total Minutes: 75    Treatment Type: Treatment  PT/PTA: PT     PTA Visit Number: 3       General Precautions: Standard, diabetic, fall, vision impaired  Orthopedic Precautions: Orthopedic Precautions : N/A   Braces: Braces: N/A         Subjective:  Communicated with JOEL Oliver prior to and during session.  At end of 1st session Melody asked PT to get pt into bed for bladder scan.          Objective:  Patient found sitting on toilet in am & in w/c at bedside in pm.   Patient found with: (In bathroom with pull cord available)    Functional Mobility:  Bed Mobility:   Supine to sit: Contact Guard Assistance > SBA   Sit to supine: Contact Guard Assistance > SBA   Rolling: Contact Guard Assistance   Scooting: Standby Assistance    Transfer Training:  Sit to stand:Moderate Assistance with No Assistive Device and Hemiwalker & vc for technique  Bed <> Chair:  Scoot Pivot with Minimal Assistance and Moderate Assistance with No Assistive Device & vc for technique  Toilet Transfer:  Pt Step Transfer with Moderate Assistance with Grab bars & vc for technique    Wheelchair Training:  Pt propelled Standard wheelchair x 190 feet on Level tile with  Right upper extremity and Right lower extremity with Minimal Assistance and for steering and vc for avoiding obstacles and technique.     Gait Training:  Gait training on level tile with Hemiwalker x 95 feet with Moderate Assistance for balance and vc for sequencing and direction/avoiding obstacles.     Additional Treatment:  Pt on toilet at start of session and needed Dep A for hygiene and clothing management plus assist with transfers as noted above.    Pt performed B LE there ex sitting x 30 reps with 3# wt and red TB, with vc for  proper execution and joint protection: ap, laq, marching, hip abd/add, ham curls.        Activity Tolerance:  Patient tolerated treatment well    Patient left HOB elevated in am per RN's request with call button in reach, bed alarm on and nurse notified.    Assessment:  Dennis Patricio is a 68 y.o. female with a medical diagnosis of <principal problem not specified>. She presents with new onset right occipital parietal acute ischemic infarct with left hemiparesis. She presents with decreased strength, decreased functional activity tolerance, impaired balance, and absent sensation to light touch on left upper and lower extremities.. Pt exhibits inattention to her left side due to a left visual field cut. Pt also demonstrates impaired righting reactions; pt is aware of midline in sitting and begins to correct herself, but is not aware of midline when standing. Pt required maximum level of assistance mainly due to balance impairments because pt unable to feel her limb on the ground when standing. Pt to benefit from skilled PT rehab services to address functional impairments.        Rehab potential is fair.    Activity tolerance: Fair    Discharge recommendations:       Equipment recommendations:       GOALS:   Multidisciplinary Problems     Physical Therapy Goals        Problem: Physical Therapy Goal    Goal Priority Disciplines Outcome Goal Variances Interventions   Physical Therapy Goal     PT, PT/OT Ongoing (interventions implemented as appropriate)     Description:  Goals to be met by: 2018     Patient will increase functional independence with mobility by performin. Supine to sit with Minimum Assistance  2. Sit to supine with Minimum Assistance  3. Rolling to Left and Right with Minimum Assistance  4. Sit to stand transfer with Minimal Assistance  5. Bed to chair transfer with Minimal Assistance using Rolling Walker  6. Gait  x 150 feet with Minimal Assistance using Rolling Walker.   7. Wheelchair  propulsion x 200 feet with Stand-by Assistance using right upper and lower extremities.  8. Ascend/descend 4 steps with bilateral Handrails Minimal Assistance.                    PLAN:    Patient to be seen daily  to address the above listed problems via gait training, therapeutic activities, therapeutic exercises, therapeutic groups, neuromuscular re-education, wheelchair management/training  Plan of Care expires: 11/29/18  Plan of Care reviewed with: patient    I certify that I was present in the room directing the student JORDI Peacock in service delivery and guiding them using my skilled judgment.          Raquel Almaguer, PT 11/21/2018

## 2018-11-22 LAB
BACTERIA UR CULT: NO GROWTH
POCT GLUCOSE: 115 MG/DL (ref 70–110)

## 2018-11-22 PROCEDURE — 97112 NEUROMUSCULAR REEDUCATION: CPT

## 2018-11-22 PROCEDURE — 97116 GAIT TRAINING THERAPY: CPT

## 2018-11-22 PROCEDURE — 97530 THERAPEUTIC ACTIVITIES: CPT

## 2018-11-22 PROCEDURE — 97110 THERAPEUTIC EXERCISES: CPT

## 2018-11-22 PROCEDURE — 92507 TX SP LANG VOICE COMM INDIV: CPT

## 2018-11-22 PROCEDURE — 97127 HC THERAPEUTIC INTVTN, COGN FUNCTION - ST: CPT

## 2018-11-22 PROCEDURE — 97542 WHEELCHAIR MNGMENT TRAINING: CPT

## 2018-11-22 PROCEDURE — 25000003 PHARM REV CODE 250: Performed by: PHYSICAL MEDICINE & REHABILITATION

## 2018-11-22 PROCEDURE — 63600175 PHARM REV CODE 636 W HCPCS: Performed by: PHYSICAL MEDICINE & REHABILITATION

## 2018-11-22 PROCEDURE — 12800000 HC REHAB SEMI-PRIVATE ROOM

## 2018-11-22 RX ADMIN — GLIPIZIDE 2.5 MG: 2.5 TABLET, FILM COATED, EXTENDED RELEASE ORAL at 08:11

## 2018-11-22 RX ADMIN — AMLODIPINE BESYLATE 10 MG: 5 TABLET ORAL at 08:11

## 2018-11-22 RX ADMIN — HYDROCHLOROTHIAZIDE 12.5 MG: 12.5 CAPSULE ORAL at 08:11

## 2018-11-22 RX ADMIN — BETHANECHOL CHLORIDE 50 MG: 25 TABLET ORAL at 08:11

## 2018-11-22 RX ADMIN — CLOPIDOGREL BISULFATE 75 MG: 75 TABLET ORAL at 08:11

## 2018-11-22 RX ADMIN — LISINOPRIL 20 MG: 10 TABLET ORAL at 08:11

## 2018-11-22 RX ADMIN — ASPIRIN 81 MG CHEWABLE TABLET 81 MG: 81 TABLET CHEWABLE at 08:11

## 2018-11-22 RX ADMIN — CIPROFLOXACIN HYDROCHLORIDE 500 MG: 250 TABLET, FILM COATED ORAL at 08:11

## 2018-11-22 RX ADMIN — ENOXAPARIN SODIUM 40 MG: 100 INJECTION SUBCUTANEOUS at 05:11

## 2018-11-22 RX ADMIN — METFORMIN HYDROCHLORIDE 1000 MG: 500 TABLET, EXTENDED RELEASE ORAL at 05:11

## 2018-11-22 RX ADMIN — BETHANECHOL CHLORIDE 50 MG: 25 TABLET ORAL at 03:11

## 2018-11-22 RX ADMIN — ATORVASTATIN CALCIUM 80 MG: 40 TABLET, FILM COATED ORAL at 08:11

## 2018-11-22 NOTE — PLAN OF CARE
Problem: Occupational Therapy Goal  Goal: Occupational Therapy Goal  Goals to be met by: 11/29/2018  Patient will increase functional independence with ADLs by performing:    Feeding with Modified [place].  UE Dressing with Set-up Assistance.  LE Dressing with Stand-by Assistance.  Grooming while seated with Set-up Assistance.   Shower transfer to TTB or shower chair with Beth and wall bar.  Bathing from  shower chair/bench with Minimal Assistance.  Toileting from bedside commode over toilet with Minimal Assistance for hygiene and clothing management.  Toilet transfer to bedside commode over toilet with Contact Guard Assistance.   Sitting at edge of bed x15 minutes with Stand-by Assistance while participating in ADL or exercise.    Outcome: Ongoing (interventions implemented as appropriate)  OT tx for NME.

## 2018-11-22 NOTE — PLAN OF CARE
Problem: Patient Care Overview  Goal: Plan of Care Review  Bladder scan done at 2130, 11/21/2018.  Patient had not voided in 12 hours.  Last in & out catheterization was done at 0900.  Bladder scan showed >560 ml.  600 ml clear, yellow urine returned from in & out catheter.

## 2018-11-22 NOTE — PROGRESS NOTES
"REHAB FOLLOWUP NOTE    Dennis Patricio is a 68 y.o. female patient.    Chief Complaint:  Right occipital parietal acute ischemic infarct with left hemiparesis.    History:  68-year-old female initially presented to emergency department with complaints of blurred vision, left-sided weakness, unsteady gait, confusion that began 2 days prior to this hospitalization.  Patient was initially seen in the emergency room 2 days prior, noted to have elevated blood pressures and was discharged home with medications.  Patient was readmitted to hospital this time with  inability to get up and walk to the bathroom, felt she could not move her left leg and walk,patient was also unable to  objects with the left hand and felt heavy and numb.  Patient had a CT scan consistent with right posterior acute ischemic infarct, MRI consistent with  right occipital parietal ischemic infarct, carotids without any significant stenosis, echocardiogram with no thrombus or right to left shunt noted.  Patient has been medically stabilized and is transferred down to us for further rehabilitation.        Past Medical History:   Diagnosis Date    Arthritis     Back pain, chronic     Diabetes mellitus     New in 2018    Hypertension        Temp: 98.3 °F (36.8 °C) (11/22/18 0855)  Pulse: 96(In gym exercising) (11/22/18 0855)  Resp: 20 (11/22/18 0855)  BP: 133/78 (11/22/18 0855)  SpO2: 97 % (11/22/18 0855)  Weight: 78.5 kg (173 lb) (11/18/18 0554)  Height: 5' 8" (172.7 cm) (11/15/18 1103)    Lab Results   Component Value Date    WBC 10.10 11/13/2018    HGB 12.5 11/13/2018    HCT 37.9 11/13/2018     (H) 11/13/2018    ALT 17 11/12/2018    AST 34 11/12/2018     11/21/2018    K 3.9 11/21/2018     11/21/2018    CREATININE 0.8 11/21/2018    BUN 12 11/21/2018    CO2 24 11/21/2018       Physical Examination:  Alert, oriented x3, voices no complaints.    Lungs are clear to auscultation.  Heart with regular rate and " rhythm.  Bilateral lower extremities without any edema or calf tenderness noted.  Left upper and lower extremity with decreased active to passive range of motion decreased motor strength.   Patient with urinary retention with Watkins catheter in place.  Voided on her own this morning.  Blood pressure adjust medication..  Blood sugars controlled monitor.  UTI on antibiotics.  Await culture and sensitivity.    Impression:  Status post new onset right occipital parietal acute ischemic infarct with left hemiparesis.  Acute renal failure secondary to urinary retention.  Hypertension.  Diabetes mellitus.  Degenerative joint disease.      Recommendations:  Continue current PT, OT, speech therapy and nursing care.      Dominique Brink MD  11/22/2018

## 2018-11-22 NOTE — PLAN OF CARE
Problem: Physical Therapy Goal  Goal: Physical Therapy Goal  Goals to be met by: 2018     Patient will increase functional independence with mobility by performin. Supine to sit with Minimum Assistance  2. Sit to supine with Minimum Assistance  3. Rolling to Left and Right with Minimum Assistance  4. Sit to stand transfer with Minimal Assistance  5. Bed to chair transfer with Minimal Assistance using Rolling Walker  6. Gait  x 150 feet with Minimal Assistance using Rolling Walker.   7. Wheelchair propulsion x 200 feet with Stand-by Assistance using right upper and lower extremities.  8. Ascend/descend 4 steps with bilateral Handrails Minimal Assistance.   Outcome: Ongoing (interventions implemented as appropriate)  Patient performed therex, GT, and transfers to meet goal set in POC

## 2018-11-22 NOTE — PT/OT/SLP PROGRESS
Speech Language Pathology Treatment          Dennis Patricio   MRN: [id number]     SLP Treatment Date: 11/22/18  Speech Start Time: 1306     Speech Stop Time: 1336     Speech Total (min): 30 min       TREATMENT BILLABLE MINUTES:  Speech Therapy Individual 30 and Total Time 30    General Precautions: Standard, diabetic, fall, vision impaired  Current Respiratory Status: room air       Subjective:  Pt alert and cooperative     Objective:   Patient found upright in wheelchair w/ alarm and belt. Assisted into SLP treatment room. Reviewed strategies to enhance recall that were introduced 11/21. Pt with excellent recall and application of strategies within demonstration item. STBY A required for Pt to setup strategy for 6 item list. 6/6 items recalled immediately and at 5 min filled delay ind'ly. Provided with a variety of functional calculation word problems, Pt completed with 80% acc inc to 90% w/ repetition.      Pain/Comfort  Pain Rating 1: 0/10  Assessment:  Dennis Patricio is a 68 y.o. female with a SLP diagnosis of Cognitive-Linguistic Impairment.       Discharge recommendations: Discharge Facility/Level Of Care Needs: home     Goals:   Multidisciplinary Problems     SLP Goals        Problem: SLP Goal    Goal Priority Disciplines Outcome   SLP Goal     SLP Ongoing (interventions implemented as appropriate)   Description:  1.  Patient will name common objects with SB assist  2.  Patient will complete visuospatial tasks with MOD assist  3.  Patient will recall 3/3 related objects with MOD assist for use of short term memory strategies  4.  Patient will require MIN assist to name 10 objects in a simple category  5.  Patient will perform simple/moderate problem solving with MIN assist                     Plan:   Patient to be seen Therapy Frequency: 5 x/week   Planned Interventions: Cognitive-Linguistic Therapy  Plan of Care Expires: 11/26/18  Plan of Care reviewed with: patient  SLP Follow-up?: Yes  SLP - Next Visit  Date: 11/23/18           Vasile Cook CCC-SLP 11/22/2018

## 2018-11-22 NOTE — PT/OT/SLP PROGRESS
Occupational Therapy  Treatment    Dennis Patricio   MRN: [id number]   Admitting Diagnosis: New onset right occipital parietal acute ischemic infarct with left hemiparesis.     OT Date of Treatment: 11/22/18   Total Time (min): 60 min      Billable Minutes:  Neuromuscular Re-education 60  Total Minutes: 60      General Precautions: Standard, diabetic, fall, vision impaired  Orthopedic Precautions: N/A  Braces: N/A    Do you have any cultural, spiritual, Islam conflicts, given your current situation?: none    Subjective:  Communicated with nurse prior to session.    Pain Rating 1: 0/10            Additional Treatment:  Pt engaged in weight bearing task to the LUE in forward and up position while performing placement tasks with the RUE.  She required min to MaxA to maintain the LUE position.  Pt engaged in gross motor tasks for placement of the LUE in varying positions.  Pt instructed in eye contact with the LUE to increase awareness of the extremity position and movement.  Pt engaged in performance of bilateral nirali with 8# weight for proprioceptive input to the LUE and instructions to maintain the L hand in digital flexion and wrist extension throughout the movements.  She was also engaged in movement of a light weight object on the table top adapted for minimally resistive movement.    Patient left up in chair with call button in reach and chair alarm on.    ASSESSMENT:  Dennis Patricio demonstrated the ability for first time today to place and hold her L hand in digital flexion with wrist extension while performing 10 reps x 2 bj nirali scapular protraction and retraction and elbow flexion and extension exercises.  She attended to the Left side throughout session with minimal instruction to do so.  She placed object with the LUE/hand at varying heights on R and L sides with good accuracy and release of movement.  Pt is following through with recommendation for positive attitude toward the function of her  NAVINE and to focus on her strengths vrs weaknesses in the extremity and to build upon the strengths.    Rehab potential is good    Activity tolerance: Good with rest periods.    Discharge recommendations: home, outpatient OT     Equipment recommendations: (TBD)     GOALS:   Multidisciplinary Problems     Occupational Therapy Goals        Problem: Occupational Therapy Goal    Goal Priority Disciplines Outcome Interventions   Occupational Therapy Goal     OT, PT/OT Ongoing (interventions implemented as appropriate)    Description:  Goals to be met by: 11/29/2018  Patient will increase functional independence with ADLs by performing:    Feeding with Modified [place].  UE Dressing with Set-up Assistance.  LE Dressing with Stand-by Assistance.  Grooming while seated with Set-up Assistance.   Shower transfer to TTB or shower chair with Beth and wall bar.  Bathing from  shower chair/bench with Minimal Assistance.  Toileting from bedside commode over toilet with Minimal Assistance for hygiene and clothing management.  Toilet transfer to bedside commode over toilet with Contact Guard Assistance.   Sitting at edge of bed x15 minutes with Stand-by Assistance while participating in ADL or exercise.                     Plan:  Patient to be seen 6 x/week to address the above listed problems via self-care/home management, community/work re-entry, therapeutic activities, therapeutic exercises, therapeutic groups, neuromuscular re-education, sensory integration, wheelchair management/training  Plan of Care expires: 11/29/18  Plan of Care reviewed with: patient         Caterina Navas, OT  11/22/2018

## 2018-11-22 NOTE — PT/OT/SLP PROGRESS
Physical Therapy         Treatment        Dennis Patricio   MRN: [id number]     PT Received On: 11/22/18  Total Time (min): 90        Billable Minutes:  Gait [id number], Therapeutic Activity 15, Therapeutic Exercise 45 and Train/Wheelchair Management 15  Total Minutes: 90    Treatment Type: Treatment  PT/PTA: PTA     PTA Visit Number: 1       General Precautions: Standard, diabetic, vision impaired, fall  Orthopedic Precautions: Orthopedic Precautions : N/A   Braces:           Subjective:  Communicated with patient and nursing prior to session.    Pain/Comfort  Pain Rating 1: 0/10  Pain Rating Post-Intervention 1: 0/10    Objective:  Patient found sitting in WC, with      Functional Mobility:  Bed Mobility:   Supine to sit: Activity did not occur   Sit to supine: Activity did not occur   Rolling: Activity did not occur   Scooting: Activity did not occur    Balance:   Static Sit: FAIR-: Maintains without assist but inconsistent   Dynamic Sit:  FAIR: Cannot move trunk without losing balance  Static Stand: POOR+: Needs MINIMAL assist to maintain  Dynamic stand: POOR: Needs MOD (moderate) assist during gait    Transfer Training:  Sit to stand:Contact Guard Assistance with No Assistive Device x10    Wheelchair Training:  Pt propelled Standard wheelchair x 200 feet on Level tile with  Bilateral upper extremity and Bilateral lower extremity with Moderate Assistance. Increased time required for L side assistance, assistance needed for steering, and assistance required for locating and locking wheel locks on both sides consistently throughout session.    Gait Training:  Patient gait trained FWB/WBAT: bilateral lower extremity 42  feet on level tile with Hemiwalker with Moderate Assistance.  Pt with demonstarting a  swing to with decreased mery, increased time in double stance, decreased velocity of limb motion, decreased step length, decreased stride length, decreased swing-to-stance ratio, decreased toe-to-floor clearance  and decreased weight-shifting ability.Impairments contributing to gait deviations include impaired balance, impaired coordination, decreased flexibility, impaired motor control, abnormal muscle tone, impaired sensory feedback and decreased strength      Additional Treatment:  Max A with donning pants, boots, mod A with donning sweater  SciFit x 10' B LE/UE with L UE secured  3# 3/10: LAQ, marching, hip abd/add, ball squeeze, seated clam with GTB   AP x 60  // bars: 3/10: HR, hip flexion, marching, hip abd    Activity Tolerance:  Patient tolerated treatment well    Patient left sitting in WC with chair alarm on and with OT in OT GYM notified.    Assessment:  Dennis Patricio is a 68 y.o. female with a medical diagnosis of <principal problem not specified>. She presents with decreased awareness of L side, decreased vision in L eye, decreased functional strength and stability, as well as decreased safety awareness.    Rehab potential is good.    Activity tolerance: Good    Discharge recommendations:       Equipment recommendations:       GOALS:   Multidisciplinary Problems     Physical Therapy Goals        Problem: Physical Therapy Goal    Goal Priority Disciplines Outcome Goal Variances Interventions   Physical Therapy Goal     PT, PT/OT Ongoing (interventions implemented as appropriate)     Description:  Goals to be met by: 2018     Patient will increase functional independence with mobility by performin. Supine to sit with Minimum Assistance  2. Sit to supine with Minimum Assistance  3. Rolling to Left and Right with Minimum Assistance  4. Sit to stand transfer with Minimal Assistance  5. Bed to chair transfer with Minimal Assistance using Rolling Walker  6. Gait  x 150 feet with Minimal Assistance using Rolling Walker.   7. Wheelchair propulsion x 200 feet with Stand-by Assistance using right upper and lower extremities.  8. Ascend/descend 4 steps with bilateral Handrails Minimal Assistance.                     PLAN:    Patient to be seen daily  to address the above listed problems via gait training, therapeutic activities, therapeutic exercises, therapeutic groups, neuromuscular re-education, wheelchair management/training  Plan of Care expires: 11/29/18  Plan of Care reviewed with: patient         Melodie Cardona, PTA 11/22/2018

## 2018-11-23 LAB — POCT GLUCOSE: 141 MG/DL (ref 70–110)

## 2018-11-23 PROCEDURE — 97110 THERAPEUTIC EXERCISES: CPT

## 2018-11-23 PROCEDURE — 97116 GAIT TRAINING THERAPY: CPT

## 2018-11-23 PROCEDURE — 97127 HC THERAPEUTIC INTVTN, COGN FUNCTION - ST: CPT

## 2018-11-23 PROCEDURE — 25000003 PHARM REV CODE 250

## 2018-11-23 PROCEDURE — 97112 NEUROMUSCULAR REEDUCATION: CPT

## 2018-11-23 PROCEDURE — 25000003 PHARM REV CODE 250: Performed by: PHYSICAL MEDICINE & REHABILITATION

## 2018-11-23 PROCEDURE — 63600175 PHARM REV CODE 636 W HCPCS: Performed by: PHYSICAL MEDICINE & REHABILITATION

## 2018-11-23 PROCEDURE — 12800000 HC REHAB SEMI-PRIVATE ROOM

## 2018-11-23 PROCEDURE — 97542 WHEELCHAIR MNGMENT TRAINING: CPT

## 2018-11-23 PROCEDURE — 97535 SELF CARE MNGMENT TRAINING: CPT

## 2018-11-23 PROCEDURE — 97530 THERAPEUTIC ACTIVITIES: CPT

## 2018-11-23 RX ORDER — FLUCONAZOLE 50 MG/1
50 TABLET ORAL DAILY
Status: COMPLETED | OUTPATIENT
Start: 2018-11-23 | End: 2018-11-27

## 2018-11-23 RX ADMIN — BETHANECHOL CHLORIDE 50 MG: 25 TABLET ORAL at 08:11

## 2018-11-23 RX ADMIN — ATORVASTATIN CALCIUM 80 MG: 40 TABLET, FILM COATED ORAL at 08:11

## 2018-11-23 RX ADMIN — FLUCONAZOLE 50 MG: 50 TABLET ORAL at 12:11

## 2018-11-23 RX ADMIN — ENOXAPARIN SODIUM 40 MG: 100 INJECTION SUBCUTANEOUS at 05:11

## 2018-11-23 RX ADMIN — AMLODIPINE BESYLATE 10 MG: 5 TABLET ORAL at 08:11

## 2018-11-23 RX ADMIN — LISINOPRIL 20 MG: 10 TABLET ORAL at 08:11

## 2018-11-23 RX ADMIN — BETHANECHOL CHLORIDE 50 MG: 25 TABLET ORAL at 03:11

## 2018-11-23 RX ADMIN — CLOPIDOGREL BISULFATE 75 MG: 75 TABLET ORAL at 08:11

## 2018-11-23 RX ADMIN — HYDROCHLOROTHIAZIDE 12.5 MG: 12.5 CAPSULE ORAL at 08:11

## 2018-11-23 RX ADMIN — CIPROFLOXACIN HYDROCHLORIDE 500 MG: 250 TABLET, FILM COATED ORAL at 08:11

## 2018-11-23 RX ADMIN — METFORMIN HYDROCHLORIDE 1000 MG: 500 TABLET, EXTENDED RELEASE ORAL at 05:11

## 2018-11-23 RX ADMIN — ASPIRIN 81 MG CHEWABLE TABLET 81 MG: 81 TABLET CHEWABLE at 08:11

## 2018-11-23 RX ADMIN — GLIPIZIDE 2.5 MG: 2.5 TABLET, FILM COATED, EXTENDED RELEASE ORAL at 08:11

## 2018-11-23 NOTE — PLAN OF CARE
Problem: SLP Goal  Goal: SLP Goal  1.  Patient will name common objects with SB assist  2.  Patient will complete visuospatial tasks with MOD assist  3.  Patient will recall 3/3 related objects with MOD assist for use of short term memory strategies  4.  Patient will require MIN assist to name 10 objects in a simple category  5.  Patient will perform simple/moderate problem solving with MIN assist     Progressing towards goals. Goals remain appropriate. Continue current POC.    Sherin Amaral MS, CCC/SLP 11/23/2018

## 2018-11-23 NOTE — PT/OT/SLP PROGRESS
Speech Language Pathology Treatment          Dennis Patricio   MRN: [id number]     Diet recommendations: Solid Diet Level: Regular  Liquid Diet Level: Thin Standard aspiration precautions    SLP Treatment Date: 11/23/18  Speech Start Time: 1500     Speech Stop Time: 1530     Speech Total (min): 30 min       TREATMENT BILLABLE MINUTES:  Speech Therapy Individual 30 and Total Time 30    General Precautions: Standard, diabetic, fall, vision impaired  Current Respiratory Status: room air       Subjective:  Shares she reads ~8 novels per week but has been unable since CVA 2/2 visual impairment.     Objective:   Patient found sitting upright in bed conversing with sister. She is very pleasant and cooperative. She completed math word problems, presented verbally, with MOD A and extra time.      Assessment:  Dennis Patricio is a 68 y.o. female with a SLP diagnosis of Cognitive-Linguistic Impairment.     Discharge recommendations: Discharge Facility/Level Of Care Needs: home     Goals:   Multidisciplinary Problems     SLP Goals        Problem: SLP Goal    Goal Priority Disciplines Outcome   SLP Goal     SLP Ongoing (interventions implemented as appropriate)   Description:  1.  Patient will name common objects with SB assist  2.  Patient will complete visuospatial tasks with MOD assist  3.  Patient will recall 3/3 related objects with MOD assist for use of short term memory strategies  4.  Patient will require MIN assist to name 10 objects in a simple category  5.  Patient will perform simple/moderate problem solving with MIN assist                     Plan:   Patient to be seen Therapy Frequency: 5 x/week   Planned Interventions: Cognitive-Linguistic Therapy  Plan of Care Expires: 11/26/18  Plan of Care reviewed with: patient, sibling  SLP Follow-up?: Yes  SLP - Next Visit Date: 11/24/18           Sherin Amaral CCC-SLP 11/23/2018

## 2018-11-23 NOTE — PROGRESS NOTES
"Nutrition Update:    NFPE done today, pt appears well-nourished, no wasting noticeable. Pt states she thinks Boost Glucose control is causing her to "run to the bathroom," more often and does not want to keep taking it. Suggested Boost Pudding, will change supplement. Will also add pt food preferences.      "

## 2018-11-23 NOTE — PROGRESS NOTES
"REHAB FOLLOWUP NOTE    Dennis Patricio is a 68 y.o. female patient.    Chief Complaint:  Right occipital parietal acute ischemic infarct with left hemiparesis.    History:  68-year-old female initially presented to emergency department with complaints of blurred vision, left-sided weakness, unsteady gait, confusion that began 2 days prior to this hospitalization.  Patient was initially seen in the emergency room 2 days prior, noted to have elevated blood pressures and was discharged home with medications.  Patient was readmitted to hospital this time with  inability to get up and walk to the bathroom, felt she could not move her left leg and walk,patient was also unable to  objects with the left hand and felt heavy and numb.  Patient had a CT scan consistent with right posterior acute ischemic infarct, MRI consistent with  right occipital parietal ischemic infarct, carotids without any significant stenosis, echocardiogram with no thrombus or right to left shunt noted.  Patient has been medically stabilized and is transferred down to us for further rehabilitation.        Past Medical History:   Diagnosis Date    Arthritis     Back pain, chronic     Diabetes mellitus     New in 2018    Hypertension        Temp: 98.5 °F (36.9 °C) (11/23/18 0437)  Pulse: 75 (11/23/18 0437)  Resp: 18 (11/23/18 0437)  BP: (!) 145/80 (11/23/18 0437)  SpO2: 98 % (11/23/18 0437)  Weight: 78.5 kg (173 lb) (11/18/18 0554)  Height: 5' 8" (172.7 cm) (11/15/18 1103)    Lab Results   Component Value Date    WBC 10.10 11/13/2018    HGB 12.5 11/13/2018    HCT 37.9 11/13/2018     (H) 11/13/2018    ALT 17 11/12/2018    AST 34 11/12/2018     11/21/2018    K 3.9 11/21/2018     11/21/2018    CREATININE 0.8 11/21/2018    BUN 12 11/21/2018    CO2 24 11/21/2018       Physical Examination:  Alert, oriented x3, voices no complaints.    Lungs are clear to auscultation.  Heart with regular rate and rhythm.  Bilateral lower " extremities without any edema or calf tenderness noted.  Left upper and lower extremity with decreased active to passive range of motion decreased motor strength.   Patient with urinary retention with intermittent voiding and high postvoid residuals.  Blood pressure controlled.  Blood sugars controlled monitor.  Urine no growth.  Yeast infection.  Will start Diflucan.    Impression:  Status post new onset right occipital parietal acute ischemic infarct with left hemiparesis.  Acute renal failure secondary to urinary retention.  Hypertension.  Diabetes mellitus.  Degenerative joint disease.      Recommendations:  Continue current PT, OT, speech therapy and nursing care.      Dominique Brink MD  11/23/2018

## 2018-11-23 NOTE — PLAN OF CARE
Problem: Occupational Therapy Goal  Goal: Occupational Therapy Goal  Goals to be met by: 11/29/2018  Patient will increase functional independence with ADLs by performing:    Feeding with Modified [place].  UE Dressing with Set-up Assistance.  LE Dressing with Stand-by Assistance.  Grooming while seated with Set-up Assistance.   Shower transfer to TTB or shower chair with Beth and wall bar.  Bathing from  shower chair/bench with Minimal Assistance.  Toileting from bedside commode over toilet with Minimal Assistance for hygiene and clothing management.  Toilet transfer to bedside commode over toilet with Contact Guard Assistance.   Sitting at edge of bed x15 minutes with Stand-by Assistance while participating in ADL or exercise.    Outcome: Ongoing (interventions implemented as appropriate)  OT tx for selfcare and NME

## 2018-11-23 NOTE — PT/OT/SLP PROGRESS
Occupational Therapy  Treatment    Dennis Patricio   MRN: [id number]   Admitting Diagnosis: New onset right occipital parietal acute ischemic infarct with left hemiparesis.     OT Date of Treatment: 11/23/18   Total Time (min): 76 min      Billable Minutes:  Self Care/Home Management 61 and Neuromuscular Re-education 15   Total Minutes: 76    General Precautions: Standard, diabetic, fall, vision impaired  Orthopedic Precautions: N/A  Braces: N/A    Do you have any cultural, spiritual, Adventist conflicts, given your current situation?: none    Subjective:  Communicated with nurse prior to session.    Pain Rating 1: 0/10                   Objective:  Patient found with: (w/c alarm)    Functional Mobility:  Transfer Training:  W/C<>toilet with 3N1 and TTB with modA    Feeding:  Set up    Bathing:  Beth sitting on TTB with cut out, LHS, wall bar and extended shower hose.    UE Dressing:  Beth    LE Dressing:  ModA    Toilet Training:  Mod/Beth    Balance:   Static Sit: Vik in the w/c and SBA to supervision on TTB  Dynamic Sit:  CGA to SBA  Static Stand: Beth  Dynamic stand:Beth         Additional Treatment:  Pt provided maxA to support self on the bench with the LUE in weight bearing while flexing forward to bathe and dress the LEs.  Pt was instructed in techniques to engage the LUE in bathing and was able to do so ~50% with use of the cloth today. She followed instructions to visually attend to the LUE and was able to perform with effort task of wringing out wash cloth.  She was also instructed in technique to move segmentaly to open hand and grasp clothing for donning.  She was unable to adequately align clothing to don and after presented with clothing in correct orientation and assisted to start over the L side , she was able to engage the LUE/hand with improved accuracy to don clothing.    Patient left up in chair with call button in reach, chair alarm on and eating lunch.    ASSESSMENT:  Dennis Patricio  demonstrated improved motor control and eye hand coordination in performance of self care and functional transfers today.  She improved in level of independence in bathing and balance in sitting on the TTB.      Rehab potential is good    Activity tolerance: Good    Discharge recommendations: home, outpatient OT     Equipment recommendations: (TBD)     GOALS:   Multidisciplinary Problems     Occupational Therapy Goals        Problem: Occupational Therapy Goal    Goal Priority Disciplines Outcome Interventions   Occupational Therapy Goal     OT, PT/OT Ongoing (interventions implemented as appropriate)    Description:  Goals to be met by: 11/29/2018  Patient will increase functional independence with ADLs by performing:    Feeding with Modified [place].  UE Dressing with Set-up Assistance.  LE Dressing with Stand-by Assistance.  Grooming while seated with Set-up Assistance.   Shower transfer to TTB or shower chair with Beth and wall bar.  Bathing from  shower chair/bench with Minimal Assistance.  Toileting from bedside commode over toilet with Minimal Assistance for hygiene and clothing management.  Toilet transfer to bedside commode over toilet with Contact Guard Assistance.   Sitting at edge of bed x15 minutes with Stand-by Assistance while participating in ADL or exercise.                     Plan:  Patient to be seen 6 x/week to address the above listed problems via self-care/home management, community/work re-entry, therapeutic activities, therapeutic exercises, therapeutic groups, neuromuscular re-education, sensory integration, wheelchair management/training  Plan of Care expires: 11/29/18  Plan of Care reviewed with: patient         Caterina Navas, OT  11/23/2018

## 2018-11-23 NOTE — PROGRESS NOTES
Spoke with daughter Yenni and provided her with an update on patient progress.  Also confirmed family training for Tuesday 11/27 at 9:30.  Case management will continue to assist with discharge planning.

## 2018-11-23 NOTE — PLAN OF CARE
Problem: Patient Care Overview  Goal: Plan of Care Review  Outcome: Ongoing (interventions implemented as appropriate)  Patient resting, calls for assist to get OOB.  Compliant with care plan.  Demonstrates improvement.

## 2018-11-23 NOTE — PLAN OF CARE
Problem: Physical Therapy Goal  Goal: Physical Therapy Goal  Goals to be met by: 2018     Patient will increase functional independence with mobility by performin. Supine to sit with Minimum Assistance  2. Sit to supine with Minimum Assistance  3. Rolling to Left and Right with Minimum Assistance  4. Sit to stand transfer with Minimal Assistance  5. Bed to chair transfer with Minimal Assistance using Rolling Walker  6. Gait  x 150 feet with Minimal Assistance using Rolling Walker.   7. Wheelchair propulsion x 200 feet with Stand-by Assistance using right upper and lower extremities.  8. Ascend/descend 4 steps with bilateral Handrails Minimal Assistance.   Outcome: Ongoing (interventions implemented as appropriate)  PT for gait training, wheelchair mobility, therapeutic activity, neuromuscular re-education and therapeutic exercise

## 2018-11-23 NOTE — PT/OT/SLP PROGRESS
Physical Therapy         Treatment        Dennis Patricio   MRN: [id number]     PT Received On: 11/23/18  Total Time (min): 75        Billable Minutes:  Gait [id number], Therapeutic Activity 10, Therapeutic Exercise 20, Neuro Re-education 15 and Train/Wheelchair Management 15  Total Minutes: 75    Treatment Type: Treatment  PT/PTA: PT(with SPT)     PTA Visit Number: 1       General Precautions: Standard, fall, vision impaired  Orthopedic Precautions: Orthopedic Precautions : N/A   Braces: Braces: N/A     Subjective:  Pt agreeable to PT. Pt reports being unable to feel if her left foot is on the ground.    Pain/Comfort  Pain Rating 1: 0/10    Objective:  Patient found seated in wheelchair in room. Patient found with: (chair alarm)    Functional Mobility:  Transfer Training:  Sit to stand:Minimal Assistance with Hemiwalker with left foot blocked for safety.    Wheelchair Training:  Pt propelled Standard wheelchair x 190 feet on Level tile with  Right upper extremity and Right lower extremity with Minimal Assistance for steering and vc for avoiding obstacles and technique.      Gait Training:  Patient gait trained FWB/WBAT: bilateral lower extremity 55  feet on level tile with Hemiwalker with Moderate Assistance for balance and vc for sequencing and direction/avoiding obstacles.     Additional Treatment:  Pt performed B LE there ex sitting x 30 reps with 3# wt and red TB, with vc for proper execution and joint protection: ap, laq, marching, hip abd/add, ham curls.     SciFit x 10 minutes     Neuro Re-ed: Static standing balance and wt shifting on foam with use of mirror for visual input with Minimum Assistance for balance and moderate vc in attempt to achieve midline orientation and control.     Activity Tolerance:  Patient tolerated treatment well    Patient left up in chair with chair alarm on and OTCaterina, present.    Assessment:  Dennis Patricio is a 68 y.o. female with a medical diagnosis of new onset right  occipital parietal acute ischemic infarct with left hemiparesis. She presents with decreased strength, decreased functional activity tolerance, impaired balance, and absent sensation to light touch on left upper and lower extremities.Pt to benefit from skilled PT rehab services to address functional impairments. Pt exhibited L knee hyperextension during gait.       Rehab potential is fair.    Activity tolerance: Good    Discharge recommendations: Discharge Facility/Level Of Care Needs: home, outpatient PT     Equipment recommendations: Equipment Needed After Discharge: (TBD)     GOALS:   Multidisciplinary Problems     Physical Therapy Goals        Problem: Physical Therapy Goal    Goal Priority Disciplines Outcome Goal Variances Interventions   Physical Therapy Goal     PT, PT/OT Ongoing (interventions implemented as appropriate)     Description:  Goals to be met by: 2018     Patient will increase functional independence with mobility by performin. Supine to sit with Minimum Assistance  2. Sit to supine with Minimum Assistance  3. Rolling to Left and Right with Minimum Assistance  4. Sit to stand transfer with Minimal Assistance  5. Bed to chair transfer with Minimal Assistance using Rolling Walker  6. Gait  x 150 feet with Minimal Assistance using Rolling Walker.   7. Wheelchair propulsion x 200 feet with Stand-by Assistance using right upper and lower extremities.  8. Ascend/descend 4 steps with bilateral Handrails Minimal Assistance.                    PLAN:    Patient to be seen daily  to address the above listed problems via gait training, therapeutic activities, therapeutic exercises, neuromuscular re-education, therapeutic groups, wheelchair management/training  Plan of Care expires: 18  Plan of Care reviewed with: patient     I certify that I was present in the room directing the student, Sugey Swift, in service delivery and guiding them using my skilled judgment. As the co-signing  therapist I have reviewed the students documentation and am responsible for the treatment, assessment, and plan.       Sugey Swift, SPT 11/23/2018

## 2018-11-24 LAB — POCT GLUCOSE: 139 MG/DL (ref 70–110)

## 2018-11-24 PROCEDURE — 63600175 PHARM REV CODE 636 W HCPCS: Performed by: PHYSICAL MEDICINE & REHABILITATION

## 2018-11-24 PROCEDURE — 12800000 HC REHAB SEMI-PRIVATE ROOM

## 2018-11-24 PROCEDURE — 97110 THERAPEUTIC EXERCISES: CPT

## 2018-11-24 PROCEDURE — 25000003 PHARM REV CODE 250: Performed by: PHYSICAL MEDICINE & REHABILITATION

## 2018-11-24 PROCEDURE — 25000003 PHARM REV CODE 250

## 2018-11-24 RX ORDER — TAMSULOSIN HYDROCHLORIDE 0.4 MG/1
0.4 CAPSULE ORAL DAILY
Status: DISCONTINUED | OUTPATIENT
Start: 2018-11-24 | End: 2018-11-29 | Stop reason: HOSPADM

## 2018-11-24 RX ADMIN — CLOPIDOGREL BISULFATE 75 MG: 75 TABLET ORAL at 08:11

## 2018-11-24 RX ADMIN — BETHANECHOL CHLORIDE 50 MG: 25 TABLET ORAL at 08:11

## 2018-11-24 RX ADMIN — GLIPIZIDE 2.5 MG: 2.5 TABLET, FILM COATED, EXTENDED RELEASE ORAL at 08:11

## 2018-11-24 RX ADMIN — ATORVASTATIN CALCIUM 80 MG: 40 TABLET, FILM COATED ORAL at 08:11

## 2018-11-24 RX ADMIN — ASPIRIN 81 MG CHEWABLE TABLET 81 MG: 81 TABLET CHEWABLE at 08:11

## 2018-11-24 RX ADMIN — HYDROCHLOROTHIAZIDE 12.5 MG: 12.5 CAPSULE ORAL at 09:11

## 2018-11-24 RX ADMIN — ONDANSETRON 4 MG: 4 TABLET, ORALLY DISINTEGRATING ORAL at 08:11

## 2018-11-24 RX ADMIN — METFORMIN HYDROCHLORIDE 1000 MG: 500 TABLET, EXTENDED RELEASE ORAL at 04:11

## 2018-11-24 RX ADMIN — ENOXAPARIN SODIUM 40 MG: 100 INJECTION SUBCUTANEOUS at 04:11

## 2018-11-24 RX ADMIN — POLYETHYLENE GLYCOL 3350 17 G: 17 POWDER, FOR SOLUTION ORAL at 08:11

## 2018-11-24 RX ADMIN — LISINOPRIL 20 MG: 10 TABLET ORAL at 08:11

## 2018-11-24 RX ADMIN — FLUCONAZOLE 50 MG: 50 TABLET ORAL at 08:11

## 2018-11-24 RX ADMIN — TAMSULOSIN HYDROCHLORIDE 0.4 MG: 0.4 CAPSULE ORAL at 11:11

## 2018-11-24 RX ADMIN — AMLODIPINE BESYLATE 10 MG: 5 TABLET ORAL at 08:11

## 2018-11-24 RX ADMIN — BETHANECHOL CHLORIDE 50 MG: 25 TABLET ORAL at 02:11

## 2018-11-24 NOTE — PT/OT/SLP PROGRESS
Physical Therapy         Treatment        Dennis Patricio   MRN: [id number]     PT Received On: 11/24/18  Total Time (min): 30        Billable Minutes:  Gait [id number] and Therapeutic Exercise 15  Total Minutes: 30    Treatment Type: Treatment  PT/PTA: PT     PTA Visit Number: 0       General Precautions: Standard, diabetic, fall, vision impaired  Orthopedic Precautions: Orthopedic Precautions : N/A   Braces: Braces: N/A         Subjective:  Communicated with RN prior to session. Pt. Agreed to do PT     Pain/Comfort  Pain Rating 1: 0/10  Pain Rating Post-Intervention 1: 0/10    Objective:  Patient found seated in WC in room.    Functional Mobility:  Bed Mobility:   Supine to sit: Activity did not occur   Sit to supine: Activity did not occur   Rolling: Activity did not occur   Scooting: Activity did not occur    Balance:   Static Sit: GOOD: Takes MODERATE challenges from all directions  Dynamic Sit:  GOOD: Maintains balance through MODERATE excursions of active trunk movement  Static Stand: FAIR+: Takes MINIMAL challenges from all directions  Dynamic stand: POOR: Needs MOD (moderate) assist during gait          Gait Training:  Not today          Additional Treatment:  B LE there-ex and SCI Fit See flow sheet     Activity Tolerance:  Patient tolerated treatment well    Patient left up in chair with RN notified.    Assessment:  Dennis Patricio is a 68 y.o. female with a medical diagnosis of <principal problem not specified>. She presents with L sided hemiplegia, abnormal gait, balance deficits.    Rehab potential is good.    Activity tolerance: Good    Discharge recommendations: Discharge Facility/Level Of Care Needs: home, outpatient OT, outpatient PT     Equipment recommendations:       GOALS:   Multidisciplinary Problems     Physical Therapy Goals        Problem: Physical Therapy Goal    Goal Priority Disciplines Outcome Goal Variances Interventions   Physical Therapy Goal     PT, PT/OT Ongoing (interventions  implemented as appropriate)     Description:  Goals to be met by: 2018     Patient will increase functional independence with mobility by performin. Supine to sit with Minimum Assistance  2. Sit to supine with Minimum Assistance  3. Rolling to Left and Right with Minimum Assistance  4. Sit to stand transfer with Minimal Assistance  5. Bed to chair transfer with Minimal Assistance using Rolling Walker  6. Gait  x 150 feet with Minimal Assistance using Rolling Walker.   7. Wheelchair propulsion x 200 feet with Stand-by Assistance using right upper and lower extremities.  8. Ascend/descend 4 steps with bilateral Handrails Minimal Assistance.                    PLAN:    Patient to be seen daily  to address the above listed problems via gait training, therapeutic activities, therapeutic exercises, therapeutic groups, neuromuscular re-education  Plan of Care expires: 18  Plan of Care reviewed with: patient         Champ Carmen, PT 2018

## 2018-11-24 NOTE — PT/OT/SLP PROGRESS
Occupational Therapy  Treatment    Dennis Patricio   MRN: [id number]   Admitting Diagnosis: CVA    OT Date of Treatment: 11/24/18   Total Time (min): 20 min      Billable Minutes:  Therapeutic Exercise 20  Total Minutes: 20    General Precautions: Standard, fall  Orthopedic Precautions: N/A  Braces: N/A    Do you have any cultural, spiritual, Denominational conflicts, given your current situation?: none    Subjective:  Communicated with nurse prior to session.    Pain/Comfort  Pain Rating 1: 0/10  Pain Rating Post-Intervention 1: 0/10        Additional Treatment:  Session included instructing/reviewing self ROM    Patient left up in chair with call button in reach    ASSESSMENT:  Dennis Patricio is a 68 y.o. female with a medical diagnosis of CVA and presents with performance deficits of physical skills including impaired balance, mobility, strength, dexterity, fine motor coordination, gross motor coordination and endurance.  These performance deficits have resulted in activity limitations including, but not limited to: bed mobility, transfers, ambulating short distances, navigating around obstacles during ambulation, transitional movement patterns ( kneeling, bending, reaching), upper body dressing, lower body dressing, grooming, toileting, bathing and carrying objects.   Pt's role as mod I adult has been significantly affected.  Patient will benefit from skilled OT services to maximize level of independence with deficits listed above.  .    Rehab potential is good    Activity tolerance: Good    Discharge recommendations: home with home health     Equipment recommendations: (TBA)     GOALS:   Multidisciplinary Problems     Occupational Therapy Goals        Problem: Occupational Therapy Goal    Goal Priority Disciplines Outcome Interventions   Occupational Therapy Goal     OT, PT/OT Ongoing (interventions implemented as appropriate)    Description:  Goals to be met by: 11/29/2018  Patient will increase functional  independence with ADLs by performing:    Feeding with Modified [place].  UE Dressing with Set-up Assistance.  LE Dressing with Stand-by Assistance.  Grooming while seated with Set-up Assistance.   Shower transfer to TTB or shower chair with Beth and wall bar.  Bathing from  shower chair/bench with Minimal Assistance.  Toileting from bedside commode over toilet with Minimal Assistance for hygiene and clothing management.  Toilet transfer to bedside commode over toilet with Contact Guard Assistance.   Sitting at edge of bed x15 minutes with Stand-by Assistance while participating in ADL or exercise.                     Plan:  Patient to be seen 6 x/week to address the above listed problems via self-care/home management, community/work re-entry, therapeutic activities, neuromuscular re-education, therapeutic groups, therapeutic exercises, sensory integration, wheelchair management/training  Plan of Care expires: 11/29/18  Plan of Care reviewed with: patient         ORVILLE Dozier  11/24/2018

## 2018-11-24 NOTE — PLAN OF CARE
Problem: Patient Care Overview  Goal: Plan of Care Review  Outcome: Ongoing (interventions implemented as appropriate)  Patient states she is getting stronger.  She is able to transfer with the assistance of 1 person.

## 2018-11-24 NOTE — PROGRESS NOTES
"REHAB FOLLOWUP NOTE    Dennis Patricio is a 68 y.o. female patient.    Chief Complaint:  Right occipital parietal acute ischemic infarct with left hemiparesis.    History:  68-year-old female initially presented to emergency department with complaints of blurred vision, left-sided weakness, unsteady gait, confusion that began 2 days prior to this hospitalization.  Patient was initially seen in the emergency room 2 days prior, noted to have elevated blood pressures and was discharged home with medications.  Patient was readmitted to hospital this time with  inability to get up and walk to the bathroom, felt she could not move her left leg and walk,patient was also unable to  objects with the left hand and felt heavy and numb.  Patient had a CT scan consistent with right posterior acute ischemic infarct, MRI consistent with  right occipital parietal ischemic infarct, carotids without any significant stenosis, echocardiogram with no thrombus or right to left shunt noted.  Patient has been medically stabilized and is transferred down to us for further rehabilitation.        Past Medical History:   Diagnosis Date    Arthritis     Back pain, chronic     Diabetes mellitus     New in 2018    Hypertension        Temp: 97.8 °F (36.6 °C) (11/24/18 0530)  Pulse: 82 (11/24/18 0856)  Resp: 20 (11/24/18 0856)  BP: (!) 138/94 (11/24/18 0856)  SpO2: 98 % (11/24/18 0856)  Weight: 78.5 kg (173 lb) (11/18/18 0554)  Height: 5' 8" (172.7 cm) (11/15/18 1103)    Lab Results   Component Value Date    WBC 10.10 11/13/2018    HGB 12.5 11/13/2018    HCT 37.9 11/13/2018     (H) 11/13/2018    ALT 17 11/12/2018    AST 34 11/12/2018     11/21/2018    K 3.9 11/21/2018     11/21/2018    CREATININE 0.8 11/21/2018    BUN 12 11/21/2018    CO2 24 11/21/2018       Physical Examination:  Alert, oriented x3, voices no complaints.    Lungs are clear to auscultation.  Heart with regular rate and rhythm.  Bilateral lower " extremities without any edema or calf tenderness noted.  Left upper and lower extremity with decreased active to passive range of motion decreased motor strength.   Patient with urinary retention with intermittent voiding and high postvoid residuals.  Blood pressure controlled.  Blood sugars controlled monitor.   Yeast infection on Diflucan.    Impression:  Status post new onset right occipital parietal acute ischemic infarct with left hemiparesis.  Acute renal failure secondary to urinary retention.  Hypertension.  Diabetes mellitus.  Degenerative joint disease.      Recommendations:  Continue current PT, OT, speech therapy and nursing care.      Dominique Brink MD  11/24/2018

## 2018-11-24 NOTE — PLAN OF CARE
Problem: Occupational Therapy Goal  Goal: Occupational Therapy Goal  Goals to be met by: 11/29/2018  Patient will increase functional independence with ADLs by performing:    Feeding with Modified [place].  UE Dressing with Set-up Assistance.  LE Dressing with Stand-by Assistance.  Grooming while seated with Set-up Assistance.   Shower transfer to TTB or shower chair with Beth and wall bar.  Bathing from  shower chair/bench with Minimal Assistance.  Toileting from bedside commode over toilet with Minimal Assistance for hygiene and clothing management.  Toilet transfer to bedside commode over toilet with Contact Guard Assistance.   Sitting at edge of bed x15 minutes with Stand-by Assistance while participating in ADL or exercise.    Outcome: Ongoing (interventions implemented as appropriate)  Goals remain appropriate.

## 2018-11-25 LAB — POCT GLUCOSE: 136 MG/DL (ref 70–110)

## 2018-11-25 PROCEDURE — 97116 GAIT TRAINING THERAPY: CPT | Performed by: PHYSICAL THERAPIST

## 2018-11-25 PROCEDURE — 63600175 PHARM REV CODE 636 W HCPCS: Performed by: PHYSICAL MEDICINE & REHABILITATION

## 2018-11-25 PROCEDURE — 97110 THERAPEUTIC EXERCISES: CPT | Performed by: PHYSICAL THERAPIST

## 2018-11-25 PROCEDURE — 25000003 PHARM REV CODE 250: Performed by: PHYSICAL MEDICINE & REHABILITATION

## 2018-11-25 PROCEDURE — 12800000 HC REHAB SEMI-PRIVATE ROOM

## 2018-11-25 RX ADMIN — AMLODIPINE BESYLATE 10 MG: 5 TABLET ORAL at 09:11

## 2018-11-25 RX ADMIN — BETHANECHOL CHLORIDE 50 MG: 25 TABLET ORAL at 09:11

## 2018-11-25 RX ADMIN — TAMSULOSIN HYDROCHLORIDE 0.4 MG: 0.4 CAPSULE ORAL at 09:11

## 2018-11-25 RX ADMIN — LISINOPRIL 20 MG: 10 TABLET ORAL at 08:11

## 2018-11-25 RX ADMIN — METFORMIN HYDROCHLORIDE 1000 MG: 500 TABLET, EXTENDED RELEASE ORAL at 04:11

## 2018-11-25 RX ADMIN — ATORVASTATIN CALCIUM 80 MG: 40 TABLET, FILM COATED ORAL at 08:11

## 2018-11-25 RX ADMIN — GLIPIZIDE 2.5 MG: 2.5 TABLET, FILM COATED, EXTENDED RELEASE ORAL at 09:11

## 2018-11-25 RX ADMIN — BETHANECHOL CHLORIDE 50 MG: 25 TABLET ORAL at 08:11

## 2018-11-25 RX ADMIN — HYDROCHLOROTHIAZIDE 12.5 MG: 12.5 CAPSULE ORAL at 09:11

## 2018-11-25 RX ADMIN — ENOXAPARIN SODIUM 40 MG: 100 INJECTION SUBCUTANEOUS at 04:11

## 2018-11-25 RX ADMIN — CLOPIDOGREL BISULFATE 75 MG: 75 TABLET ORAL at 09:11

## 2018-11-25 RX ADMIN — POLYETHYLENE GLYCOL 3350 17 G: 17 POWDER, FOR SOLUTION ORAL at 09:11

## 2018-11-25 RX ADMIN — FLUCONAZOLE 50 MG: 50 TABLET ORAL at 09:11

## 2018-11-25 RX ADMIN — ASPIRIN 81 MG CHEWABLE TABLET 81 MG: 81 TABLET CHEWABLE at 09:11

## 2018-11-25 RX ADMIN — BETHANECHOL CHLORIDE 50 MG: 25 TABLET ORAL at 04:11

## 2018-11-25 NOTE — PT/OT/SLP PROGRESS
Physical Therapy         Treatment        Dennis Patricio   MRN: [id number]     PT Received On: 11/25/18  Total Time (min): 30        Billable Minutes:  Gait [id number] and Therapeutic Exercise 20  Total Minutes: 30    Treatment Type: Treatment  PT/PTA: PT     PTA Visit Number: 0       General Precautions: Standard, fall  Orthopedic Precautions: Orthopedic Precautions : N/A   Braces:           Subjective:  Communicated with nursing prior to session.         Objective:  Patient found seated in w/c, with  chair alarm    Functional Mobility:  Bed Mobility:   Supine to sit: Activity did not occur   Sit to supine: Activity did not occur   Rolling: Activity did not occur   Scooting: Activity did not occur      Transfer Training:  Sit to stand:Minimal Assistance with Standard Walker .    Wheelchair Training:  Pt propelled Standard wheelchair x 170 feet on Level tile with  Right upper extremity and Right lower extremity with Minimal Assistance.     Gait Training:  Patient ambulated 80 feet with SW and CGA with w/c in tow        Additional Treatment:  Seated marching 3 x 10 B  LAQ 3 x 10 B  HR/TR 3 x 10 B  Adductor squeeze 3 x 10  Seated hip abduction 3 x 10 GTB      Activity Tolerance:  Patient tolerated treatment well    Patient left up in chair with call button in reach and chair alarm on.    Assessment:  Dennis Patricio is a 68 y.o. female with a medical diagnosis of <principal problem not specified>. She presents with decreased strength and endurance.    Rehab potential is good.    Activity tolerance: Good    Discharge recommendations:       Equipment recommendations:       GOALS:   Multidisciplinary Problems     Physical Therapy Goals        Problem: Physical Therapy Goal    Goal Priority Disciplines Outcome Goal Variances Interventions   Physical Therapy Goal     PT, PT/OT Ongoing (interventions implemented as appropriate)     Description:  Goals to be met by: 12/7/2018     Patient will increase functional  independence with mobility by performin. Supine to sit with Minimum Assistance  2. Sit to supine with Minimum Assistance  3. Rolling to Left and Right with Minimum Assistance  4. Sit to stand transfer with Minimal Assistance  5. Bed to chair transfer with Minimal Assistance using Rolling Walker  6. Gait  x 150 feet with Minimal Assistance using Rolling Walker.   7. Wheelchair propulsion x 200 feet with Stand-by Assistance using right upper and lower extremities.  8. Ascend/descend 4 steps with bilateral Handrails Minimal Assistance.                    PLAN:    Patient to be seen daily  to address the above listed problems via gait training, therapeutic activities, therapeutic exercises, therapeutic groups, neuromuscular re-education  Plan of Care expires: 18  Plan of Care reviewed with: patient         Mike RUSSEL Arcenio, PT 2018

## 2018-11-25 NOTE — PLAN OF CARE
Problem: Patient Care Overview  Goal: Plan of Care Review  Outcome: Ongoing (interventions implemented as appropriate)  Patient continues with inability to void.  In and out catheterizations continue about every 12 hours.  Patient is able to transfer with the help of 1 person.

## 2018-11-25 NOTE — PLAN OF CARE
Problem: Physical Therapy Goal  Goal: Physical Therapy Goal  Goals to be met by: 2018     Patient will increase functional independence with mobility by performin. Supine to sit with Minimum Assistance  2. Sit to supine with Minimum Assistance  3. Rolling to Left and Right with Minimum Assistance  4. Sit to stand transfer with Minimal Assistance  5. Bed to chair transfer with Minimal Assistance using Rolling Walker  6. Gait  x 150 feet with Minimal Assistance using Rolling Walker.   7. Wheelchair propulsion x 200 feet with Stand-by Assistance using right upper and lower extremities.  8. Ascend/descend 4 steps with bilateral Handrails Minimal Assistance.   Outcome: Ongoing (interventions implemented as appropriate)  Patient participating in PT as per POC. Patient ambulated 80 feet with SW and CGA with w/c in tow.

## 2018-11-26 LAB
ANION GAP SERPL CALC-SCNC: 10 MMOL/L
BASOPHILS # BLD AUTO: 0 K/UL
BASOPHILS NFR BLD: 0.6 %
BUN SERPL-MCNC: 16 MG/DL
CALCIUM SERPL-MCNC: 9.8 MG/DL
CHLORIDE SERPL-SCNC: 103 MMOL/L
CO2 SERPL-SCNC: 26 MMOL/L
CREAT SERPL-MCNC: 1 MG/DL
DIFFERENTIAL METHOD: ABNORMAL
EOSINOPHIL # BLD AUTO: 0.2 K/UL
EOSINOPHIL NFR BLD: 2.8 %
ERYTHROCYTE [DISTWIDTH] IN BLOOD BY AUTOMATED COUNT: 12.7 %
EST. GFR  (AFRICAN AMERICAN): >60 ML/MIN/1.73 M^2
EST. GFR  (NON AFRICAN AMERICAN): 58 ML/MIN/1.73 M^2
GLUCOSE SERPL-MCNC: 163 MG/DL
HCT VFR BLD AUTO: 34.2 %
HGB BLD-MCNC: 11.4 G/DL
LYMPHOCYTES # BLD AUTO: 2.1 K/UL
LYMPHOCYTES NFR BLD: 31.3 %
MCH RBC QN AUTO: 28.5 PG
MCHC RBC AUTO-ENTMCNC: 33.2 G/DL
MCV RBC AUTO: 86 FL
MONOCYTES # BLD AUTO: 0.7 K/UL
MONOCYTES NFR BLD: 10.8 %
NEUTROPHILS # BLD AUTO: 3.6 K/UL
NEUTROPHILS NFR BLD: 54.5 %
PLATELET # BLD AUTO: 400 K/UL
PMV BLD AUTO: 7.7 FL
POCT GLUCOSE: 165 MG/DL (ref 70–110)
POTASSIUM SERPL-SCNC: 3.9 MMOL/L
RBC # BLD AUTO: 3.98 M/UL
SODIUM SERPL-SCNC: 139 MMOL/L
WBC # BLD AUTO: 6.7 K/UL

## 2018-11-26 PROCEDURE — 97110 THERAPEUTIC EXERCISES: CPT

## 2018-11-26 PROCEDURE — 63600175 PHARM REV CODE 636 W HCPCS: Performed by: PHYSICAL MEDICINE & REHABILITATION

## 2018-11-26 PROCEDURE — 97127 HC THERAPEUTIC INTVTN, COGN FUNCTION - ST: CPT

## 2018-11-26 PROCEDURE — 12800000 HC REHAB SEMI-PRIVATE ROOM

## 2018-11-26 PROCEDURE — 97112 NEUROMUSCULAR REEDUCATION: CPT

## 2018-11-26 PROCEDURE — 85025 COMPLETE CBC W/AUTO DIFF WBC: CPT

## 2018-11-26 PROCEDURE — 97535 SELF CARE MNGMENT TRAINING: CPT

## 2018-11-26 PROCEDURE — 36415 COLL VENOUS BLD VENIPUNCTURE: CPT

## 2018-11-26 PROCEDURE — 97116 GAIT TRAINING THERAPY: CPT

## 2018-11-26 PROCEDURE — 25000003 PHARM REV CODE 250: Performed by: PHYSICAL MEDICINE & REHABILITATION

## 2018-11-26 PROCEDURE — 80048 BASIC METABOLIC PNL TOTAL CA: CPT

## 2018-11-26 RX ADMIN — AMLODIPINE BESYLATE 10 MG: 5 TABLET ORAL at 09:11

## 2018-11-26 RX ADMIN — ONDANSETRON 4 MG: 4 TABLET, ORALLY DISINTEGRATING ORAL at 09:11

## 2018-11-26 RX ADMIN — FLUCONAZOLE 50 MG: 50 TABLET ORAL at 09:11

## 2018-11-26 RX ADMIN — METFORMIN HYDROCHLORIDE 1000 MG: 500 TABLET, EXTENDED RELEASE ORAL at 06:11

## 2018-11-26 RX ADMIN — TAMSULOSIN HYDROCHLORIDE 0.4 MG: 0.4 CAPSULE ORAL at 09:11

## 2018-11-26 RX ADMIN — BETHANECHOL CHLORIDE 50 MG: 25 TABLET ORAL at 08:11

## 2018-11-26 RX ADMIN — CLOPIDOGREL BISULFATE 75 MG: 75 TABLET ORAL at 09:11

## 2018-11-26 RX ADMIN — GLIPIZIDE 2.5 MG: 2.5 TABLET, FILM COATED, EXTENDED RELEASE ORAL at 09:11

## 2018-11-26 RX ADMIN — BETHANECHOL CHLORIDE 50 MG: 25 TABLET ORAL at 03:11

## 2018-11-26 RX ADMIN — BETHANECHOL CHLORIDE 50 MG: 25 TABLET ORAL at 09:11

## 2018-11-26 RX ADMIN — ASPIRIN 81 MG CHEWABLE TABLET 81 MG: 81 TABLET CHEWABLE at 09:11

## 2018-11-26 RX ADMIN — ATORVASTATIN CALCIUM 80 MG: 40 TABLET, FILM COATED ORAL at 08:11

## 2018-11-26 RX ADMIN — ENOXAPARIN SODIUM 40 MG: 100 INJECTION SUBCUTANEOUS at 06:11

## 2018-11-26 RX ADMIN — LISINOPRIL 20 MG: 10 TABLET ORAL at 08:11

## 2018-11-26 RX ADMIN — HYDROCHLOROTHIAZIDE 12.5 MG: 12.5 CAPSULE ORAL at 09:11

## 2018-11-26 NOTE — PT/OT/SLP PROGRESS
Occupational Therapy  Treatment    Dennis Patricio   MRN: [id number]   Admitting Diagnosis: CVA      OT Date of Treatment: 11/26/18   Total Time (min): 75 min      Billable Minutes:  Self Care/Home Management 75  Total Minutes: 75    General Precautions: Standard, fall    Do you have any cultural, spiritual, Rastafarian conflicts, given your current situation?: none    Subjective:  Communicated with nurse prior to session.    Pain/Comfort  Pain Rating 1: 0/10    Objective:      Functional Mobility:    Transfer Training:  Toilet transfer: stand pivot with min A with grab bar and bedside commode over toilet  Shower transfer: stand pivot with min A with transfer tub bench with cut out    Feeding:  Patient performed feeding with set up A.    Bathing:  Patient performed bathing with min A with grab bar, handheld shower head and transfer tub bench with cut out.    UE Dressing:  Patient performed UE dressing with min A.    LE Dressing:  Patient don/doffed socks with sock aid with mod A for adjusting and pulling up.  Patient don/doffed adult brief with mod A.  Patient don/doffed pants with mod A.    Toilet Training:  Patient performed toileting with mod A for hygiene and pulling pants up.    Balance:   Static Sit: GOOD: Takes MODERATE challenges from all directions  Dynamic Sit:  GOOD: Maintains balance through MODERATE excursions of active trunk movement  Static Stand: POOR+: Needs MINIMAL assist to maintain  Dynamic stand: POOR: Needs MOD (moderate) assist during gait    Additional Treatment:    Patient was given instruction on shona dressing techniques. She returned demonstration with min A with UBD and mod A with LBD.     Patient left up in chair with call button in reach and chair alarm on    ASSESSMENT:  Patient demonstrates improved motor control and L hand coordination with self care and functional transfers. She would benefit from continued skilled occupational therapy services to improve her independence and mobility.        Rehab potential is good    Activity tolerance: good      GOALS:   Multidisciplinary Problems     Occupational Therapy Goals        Problem: Occupational Therapy Goal    Goal Priority Disciplines Outcome Interventions   Occupational Therapy Goal     OT, PT/OT Ongoing (interventions implemented as appropriate)    Description:  Goals to be met by: 11/29/2018  Patient will increase functional independence with ADLs by performing:    Feeding with Modified [place].  UE Dressing with Set-up Assistance.  LE Dressing with Stand-by Assistance.  Grooming while seated with Set-up Assistance.   Shower transfer to TTB or shower chair with Beth and wall bar.  Bathing from  shower chair/bench with Minimal Assistance.  Toileting from bedside commode over toilet with Minimal Assistance for hygiene and clothing management.  Toilet transfer to bedside commode over toilet with Contact Guard Assistance.   Sitting at edge of bed x15 minutes with Stand-by Assistance while participating in ADL or exercise.                     Plan:  Patient to be seen 6 x/week to address the above listed problems via self-care/home management, community/work re-entry, therapeutic activities, therapeutic exercises, therapeutic groups, neuromuscular re-education, sensory integration, wheelchair management/training  Plan of Care expires: 11/29/18  Plan of Care reviewed with: patient      Morenita ORVILLE Saldivar    11/26/2018

## 2018-11-26 NOTE — PLAN OF CARE
Problem: Patient Care Overview  Goal: Plan of Care Review  Outcome: Ongoing (interventions implemented as appropriate)  Voiding per self no catheterization required this shift

## 2018-11-26 NOTE — PLAN OF CARE
Problem: SLP Goal  Goal: SLP Goal  1.  Patient will name common objects with SB assist  2.  Patient will complete visuospatial tasks with MOD assist  3.  Patient will recall 3/3 related objects with MOD assist for use of short term memory strategies  4.  Patient will require MIN assist to name 10 objects in a simple category  5.  Patient will perform simple/moderate problem solving with MIN assist     Participated in therapy. Continue POC.     Sherin Amaral MS, CCC/SLP 11/26/2018

## 2018-11-26 NOTE — PT/OT/SLP PROGRESS
Speech Language Pathology Treatment          Dennis Patricio   MRN: [id number]     Diet recommendations: Solid Diet Level: Regular  Liquid Diet Level: Thin Standard aspiration precautions    SLP Treatment Date: 11/26/18  Speech Start Time: 1454     Speech Stop Time: 1524     Speech Total (min): 30 min       TREATMENT BILLABLE MINUTES:  Speech Therapy Individual 30 and Total Time 30    General Precautions: Standard, fall  Current Respiratory Status: room air       Subjective:  Pleasant and participatory.    Objective:   Patient found sitting in w/c in room. She answered math word problems (related to time), presented verbally, with 40% accuracy given MAX A. Task discontinued. She provided solutions to hypothetical problems, presented verbally, with MIN A.      Assessment:  Dennis Patricio is a 68 y.o. female with a SLP diagnosis of Cognitive-Linguistic Impairment. She presents with good effort.     Discharge recommendations: Discharge Facility/Level Of Care Needs: home     Goals:   Multidisciplinary Problems     SLP Goals        Problem: SLP Goal    Goal Priority Disciplines Outcome   SLP Goal     SLP Ongoing (interventions implemented as appropriate)   Description:  1.  Patient will name common objects with SB assist  2.  Patient will complete visuospatial tasks with MOD assist  3.  Patient will recall 3/3 related objects with MOD assist for use of short term memory strategies  4.  Patient will require MIN assist to name 10 objects in a simple category  5.  Patient will perform simple/moderate problem solving with MIN assist                     Plan:   Patient to be seen Therapy Frequency: 5 x/week   Planned Interventions: Cognitive-Linguistic Therapy  Plan of Care Expires: 11/26/18  Plan of Care reviewed with: patient  SLP Follow-up?: Yes  SLP - Next Visit Date: 11/27/18           Sherin Amaral CCC-SLP 11/26/2018

## 2018-11-26 NOTE — PT/OT/SLP PROGRESS
Physical Therapy         Treatment        Dennis Patricio   MRN: [id number]     PT Received On: 11/26/18  Total Time (min): 75        Billable Minutes:  Gait [id number], Therapeutic Exercise 32 and Neuromuscular Re-education 20  Total Minutes: 75    Treatment Type: Treatment  PT/PTA: PTA     PTA Visit Number: 1       General Precautions: Standard, fall  Orthopedic Precautions: Orthopedic Precautions : N/A   Braces: Braces: N/A         Subjective:  Communicated with Rommel falcon prior to session.    Pain/Comfort  Pain Rating 1: 0/10  Pain Rating Post-Intervention 1: 0/10    Objective:  Patient found up in WC, with chair alarm on. Tennis shoes where doned and gait belt placed on pt for safety.      Functional Mobility:  Bed Mobility:   Supine to sit: Activity did not occur   Sit to supine: Activity did not occur   Rolling: Activity did not occur   Scooting: Activity did not occur    Balance:   Static Sit:   Dynamic Sit:   Static Stand: 0: Needs MAXIMAL assist to maintain   Dynamic stand: 0: N/A    Transfer Training:  Sit to stand:Minimal Assistance with Hemiwalker and CGA with VC due to left and backward lean    Wheelchair Training:  Pt propelled Standard wheelchair x 190' feet on Level tile with  Right upper extremity and Bilateral lower extremity with Minimal Assistance and some TC and VC with turns and use of BLE on straight paths.     Gait Training:  Patient gait trained FWB/WBAT: bilateral lower extremity 111'  feet on level tile with Hemiwalker with Contact Guard Assistance and VC and TC to correct lateral left lean.  Pt with demonstarting a  3-point gait, full weightbearing and swing to with decreased mery, increased time in double stance and decreased weight-shifting ability.Impairments contributing to gait deviations include impaired balance, impaired coordination, impaired motor control, impaired postural control, decreased sensation and impaired sensory feedback      Additional Treatment:  Partial sit  "to stand from WC 1 x 10. GS/QS with BLE on mat x 30. Sitting ex 3 x 10 with 3#: marching, LAQ, ball squeeze, hip abd with BTB, HSC with BTB.  Neuro: Standing in // bars with WBOS x 30" w/o support x 3 and with NBOS x 6 sec x 4. Side stepping R/L slowly in // bars with support and without support x 6.     Activity Tolerance:  Patient tolerated treatment well    Patient left up in chair with call button in reach, chair alarm on and nurse Rommel notified.    Assessment:  Dennis Patricio is a 68 y.o. female with a medical diagnosis of <principal problem not specified>. She presents with CVA with L shona and L visual fieldcut. She did well with shona walker today and did not have as much backward lean as she did with RW and had much more control with feet. Having shoes also seemed to help as the added weight was a new added weight that she was acutely aware of as she walked and exercised. She was able to stand on the foam for 30" with a WBOS x 2 without LOB with the shoes on.    Rehab potential is fair.    Activity tolerance: Good    Discharge recommendations: Discharge Facility/Level Of Care Needs: home     Equipment recommendations:       GOALS:   Multidisciplinary Problems     Physical Therapy Goals        Problem: Physical Therapy Goal    Goal Priority Disciplines Outcome Goal Variances Interventions   Physical Therapy Goal     PT, PT/OT Ongoing (interventions implemented as appropriate)     Description:  Goals to be met by: 2018     Patient will increase functional independence with mobility by performin. Supine to sit with Minimum Assistance  2. Sit to supine with Minimum Assistance  3. Rolling to Left and Right with Minimum Assistance  4. Sit to stand transfer with Minimal Assistance  5. Bed to chair transfer with Minimal Assistance using Rolling Walker  6. Gait  x 150 feet with Minimal Assistance using Rolling Walker.   7. Wheelchair propulsion x 200 feet with Stand-by Assistance using right upper and " lower extremities.  8. Ascend/descend 4 steps with bilateral Handrails Minimal Assistance.                    PLAN:    Patient to be seen daily  to address the above listed problems via gait training, therapeutic activities, therapeutic exercises, therapeutic groups, wheelchair management/training, neuromuscular re-education  Plan of Care expires: 11/29/18  Plan of Care reviewed with: patient         Melodie Linton, PTA 11/26/2018

## 2018-11-26 NOTE — PROGRESS NOTES
"REHAB FOLLOWUP NOTE    Dennis Patricio is a 68 y.o. female patient.    Chief Complaint:  Right occipital parietal acute ischemic infarct with left hemiparesis.    History:  68-year-old female initially presented to emergency department with complaints of blurred vision, left-sided weakness, unsteady gait, confusion that began 2 days prior to this hospitalization.  Patient was initially seen in the emergency room 2 days prior, noted to have elevated blood pressures and was discharged home with medications.  Patient was readmitted to hospital this time with  inability to get up and walk to the bathroom, felt she could not move her left leg and walk,patient was also unable to  objects with the left hand and felt heavy and numb.  Patient had a CT scan consistent with right posterior acute ischemic infarct, MRI consistent with  right occipital parietal ischemic infarct, carotids without any significant stenosis, echocardiogram with no thrombus or right to left shunt noted.  Patient has been medically stabilized and is transferred down to us for further rehabilitation.        Past Medical History:   Diagnosis Date    Arthritis     Back pain, chronic     Diabetes mellitus     New in 2018    Hypertension        Temp: 98.6 °F (37 °C) (11/26/18 0600)  Pulse: 78 (11/26/18 0600)  Resp: 18 (11/26/18 0600)  BP: 132/64 (11/26/18 0600)  SpO2: 98 % (11/26/18 0600)  Weight: 78.5 kg (173 lb) (11/18/18 0554)  Height: 5' 8" (172.7 cm) (11/15/18 1103)    Lab Results   Component Value Date    WBC 6.70 11/26/2018    HGB 11.4 (L) 11/26/2018    HCT 34.2 (L) 11/26/2018     (H) 11/26/2018    ALT 17 11/12/2018    AST 34 11/12/2018     11/26/2018    K 3.9 11/26/2018     11/26/2018    CREATININE 1.0 11/26/2018    BUN 16 11/26/2018    CO2 26 11/26/2018       Physical Examination:  Alert, oriented x3, voices no complaints.    Lungs are clear to auscultation.  Heart with regular rate and rhythm.  Bilateral lower " extremities without any edema or calf tenderness noted.  Left upper and lower extremity with decreased active to passive range of motion decreased motor strength.   Patient with urinary retention with intermittent voiding and high postvoid residuals.  Blood pressure controlled.  Blood sugars controlled monitor.   Yeast infection on Diflucan.  Steady progress.  Labs reviewed.    Impression:  Status post new onset right occipital parietal acute ischemic infarct with left hemiparesis.  Acute renal failure secondary to urinary retention.  Hypertension.  Diabetes mellitus.  Degenerative joint disease.      Recommendations:  Continue current PT, OT, speech therapy and nursing care.      Dominique Brink MD  11/26/2018

## 2018-11-26 NOTE — PLAN OF CARE
Problem: Patient Care Overview  Goal: Plan of Care Review  Outcome: Outcome(s) achieved Date Met: 11/26/18  Patient continues with in & out catheterizations.  She is feeling stronger and is more steady on her feet.

## 2018-11-27 LAB — POCT GLUCOSE: 122 MG/DL (ref 70–110)

## 2018-11-27 PROCEDURE — 97530 THERAPEUTIC ACTIVITIES: CPT

## 2018-11-27 PROCEDURE — 25000003 PHARM REV CODE 250: Performed by: PHYSICAL MEDICINE & REHABILITATION

## 2018-11-27 PROCEDURE — 63600175 PHARM REV CODE 636 W HCPCS: Performed by: PHYSICAL MEDICINE & REHABILITATION

## 2018-11-27 PROCEDURE — 97803 MED NUTRITION INDIV SUBSEQ: CPT

## 2018-11-27 PROCEDURE — 97535 SELF CARE MNGMENT TRAINING: CPT

## 2018-11-27 PROCEDURE — 12800000 HC REHAB SEMI-PRIVATE ROOM

## 2018-11-27 PROCEDURE — 97542 WHEELCHAIR MNGMENT TRAINING: CPT

## 2018-11-27 PROCEDURE — 97116 GAIT TRAINING THERAPY: CPT

## 2018-11-27 PROCEDURE — 97127 HC THERAPEUTIC INTVTN, COGN FUNCTION - ST: CPT

## 2018-11-27 RX ADMIN — ENOXAPARIN SODIUM 40 MG: 100 INJECTION SUBCUTANEOUS at 05:11

## 2018-11-27 RX ADMIN — POLYETHYLENE GLYCOL 3350 17 G: 17 POWDER, FOR SOLUTION ORAL at 08:11

## 2018-11-27 RX ADMIN — GLIPIZIDE 2.5 MG: 2.5 TABLET, FILM COATED, EXTENDED RELEASE ORAL at 08:11

## 2018-11-27 RX ADMIN — HYDROCHLOROTHIAZIDE 12.5 MG: 12.5 CAPSULE ORAL at 08:11

## 2018-11-27 RX ADMIN — FLUCONAZOLE 50 MG: 50 TABLET ORAL at 08:11

## 2018-11-27 RX ADMIN — BETHANECHOL CHLORIDE 50 MG: 25 TABLET ORAL at 05:11

## 2018-11-27 RX ADMIN — ONDANSETRON 4 MG: 4 TABLET, ORALLY DISINTEGRATING ORAL at 09:11

## 2018-11-27 RX ADMIN — BETHANECHOL CHLORIDE 50 MG: 25 TABLET ORAL at 08:11

## 2018-11-27 RX ADMIN — TAMSULOSIN HYDROCHLORIDE 0.4 MG: 0.4 CAPSULE ORAL at 08:11

## 2018-11-27 RX ADMIN — ATORVASTATIN CALCIUM 80 MG: 40 TABLET, FILM COATED ORAL at 08:11

## 2018-11-27 RX ADMIN — CLOPIDOGREL BISULFATE 75 MG: 75 TABLET ORAL at 08:11

## 2018-11-27 RX ADMIN — METFORMIN HYDROCHLORIDE 1000 MG: 500 TABLET, EXTENDED RELEASE ORAL at 05:11

## 2018-11-27 RX ADMIN — LISINOPRIL 20 MG: 10 TABLET ORAL at 08:11

## 2018-11-27 RX ADMIN — ASPIRIN 81 MG CHEWABLE TABLET 81 MG: 81 TABLET CHEWABLE at 08:11

## 2018-11-27 NOTE — PLAN OF CARE
Problem: Physical Therapy Goal  Goal: Physical Therapy Goal  Goals to be met by: 2018     Patient will increase functional independence with mobility by performin. Supine to sit with Minimum Assistance  2. Sit to supine with Minimum Assistance  3. Rolling to Left and Right with Minimum Assistance  4. Sit to stand transfer with Minimal Assistance  5. Bed to chair transfer with Minimal Assistance using Rolling Walker  6. Gait  x 150 feet with Minimal Assistance using Rolling Walker.   7. Wheelchair propulsion x 200 feet with Stand-by Assistance using right upper and lower extremities.  8. Ascend/descend 4 steps with bilateral Handrails Minimal Assistance.   Outcome: Ongoing (interventions implemented as appropriate)    PT for family training including transfers, gait and w/c mobility and education.

## 2018-11-27 NOTE — PLAN OF CARE
Problem: Nutrition, Imbalanced: Inadequate Oral Intake (Adult)  Goal: Improved Oral Intake  Patient will demonstrate the desired outcomes by discharge/transition of care.  Intervention: carbohydrate/fat/and sodium modified diet + Nutrition supplement therapy-commercial beverage    Recommendation:   1)  Continue consistent carbohydrate, cardiac diet and (3-4 carb servings per meal)   2) Consider checking pt's a1C   3) Continue Ensure pudding BID    Intervention:  Modified carbohydrate diet with commercial food  Goals: 1) Pt will consume >=75% meals during admit  Nutrition Goal Status: met/ongoing  Communication of RD Recs: (POC)

## 2018-11-27 NOTE — PT/OT/SLP PROGRESS
Occupational Therapy  Treatment    Dennis Patricio   MRN: [id number]   Admitting Diagnosis: New onset right occipital parietal acute ischemic infarct with left hemiparesis.     OT Date of Treatment: 11/27/18   Total Time (min): 85 min      Billable Minutes:  Self Care/Home Management 70 and Therapeutic Activity 15  Total Minutes: 85      General Precautions: Standard, fall, vision impaired  Orthopedic Precautions: N/A  Braces: N/A    Do you have any cultural, spiritual, Taoist conflicts, given your current situation?: none    Subjective:  Communicated with nurse prior to session.    Pain Rating 1: 0/10                   Objective:  Patient found with: (w/c alarm, her sister, her daughter and 2 grandchildren present.)    Functional Mobility:  Transfer Training:  ModA for safety for w/c<>toilet and TTB for safety.        Bathing:  Beth with use of long handle bath sponge, extended shower hose TTB with cutout and wall bar.    UE Dressing:  Beth    LE Dressing:  ModA     Toilet Training:  ModA         Balance:   Static Sit: Vik in the w/c and SBA to supervision on TTB  Dynamic Sit:  CGA to SBA  Static Stand: Beth  Dynamic stand:Beth/ModA       Additional Treatment:  Pt's sister and daughter actively participated in assisting pt with bathroom transfers, toileting and dressing.  DME needs discussed and recommended and the were in agreement for need of a 3N1, TTB with cutout and tub rail as well as an extended shower hose.   Education in home safety provided with recommendations for home environment modifications and pt interaction with the environment   Pt instructed in safe sit<>stand sequences for body position and hand placement sequence.  Family instructed and aware that pt needs 24 hour supervision for safety.  Stressed giving pt the time to perform tasks to her ability and to problem solve through task performance.  Family instructed in providing guidance to the LUE for it to assist pt in performance of tasks.   They observed that pt is incontinent of bowels at times and establishment of a toileting schedule at home was recommended.          Patient left up in chair with chair alarm on and family  present.    ASSESSMENT:  Dennis Patricio 's family was receptive to all training and recommendations.  They actively participated and assisted pt with self care tasks and bathroom transfers.  They report they will always have at least 2 persons present to assist patient in tasks at home.  They expressed pleasure at the progress of patient during her rehab stay.      Rehab potential is good    Activity tolerance: Good, however pt was fatigued at end of the session.    Discharge recommendations: home, outpatient OT     Equipment recommendations: 3-in-1 commode, bedside commode(tub rail)     GOALS:   Multidisciplinary Problems     Occupational Therapy Goals        Problem: Occupational Therapy Goal    Goal Priority Disciplines Outcome Interventions   Occupational Therapy Goal     OT, PT/OT Ongoing (interventions implemented as appropriate)    Description:  Goals to be met by: 11/29/2018  Patient will increase functional independence with ADLs by performing:    Feeding with Modified [place].  UE Dressing with Set-up Assistance.  LE Dressing with Stand-by Assistance.  Grooming while seated with Set-up Assistance.   Shower transfer to TTB or shower chair with Beth and wall bar.  Bathing from  shower chair/bench with Minimal Assistance.  Toileting from bedside commode over toilet with Minimal Assistance for hygiene and clothing management.  Toilet transfer to bedside commode over toilet with Contact Guard Assistance.   Sitting at edge of bed x15 minutes with Stand-by Assistance while participating in ADL or exercise.                     Plan:  Patient to be seen 6 x/week to address the above listed problems via self-care/home management, community/work re-entry, therapeutic activities, therapeutic exercises, therapeutic groups,  neuromuscular re-education, cognitive retraining, sensory integration, wheelchair management/training  Plan of Care expires: 11/29/18  Plan of Care reviewed with: patient, daughter, sibling         Caterina Navas, JEFF  11/27/2018

## 2018-11-27 NOTE — PT/OT/SLP PROGRESS
"Speech Language Pathology Treatment          Dennis Patricio   MRN: [id number]     Diet recommendations: Solid Diet Level: Regular  Liquid Diet Level: Thin Standard aspiration precautions    SLP Treatment Date: 11/27/18  Speech Start Time: 1620     Speech Stop Time: 1650     Speech Total (min): 30 min       TREATMENT BILLABLE MINUTES:  Speech Therapy Individual 30 and Total Time 30    General Precautions: Standard, vision impaired, fall  Current Respiratory Status: room air       Subjective:  "Right across here hurt a little bit." Frontal HA    Objective:   Patient found sitting upright in bed, with Patient found with: (heating pad). She was initially distracted by HA but improved as session progressed. She recalled 3/3 and 5/5 related words after 5 minutes. She named an average of 10 items in concrete category across 2 trials. Completed compare/contrast task with 100% accuracy, MOD I.     Pain/Comfort  Pain Rating 1: 3/10  Location 1: head  Pain Addressed 1: Nurse notified    Assessment:  Dennis Patricio is a 68 y.o. female with a SLP diagnosis of Cognitive-Linguistic Impairment. She presents with good effort and progressing towards goals.     Discharge recommendations: Discharge Facility/Level Of Care Needs: home     Goals:   Multidisciplinary Problems     SLP Goals        Problem: SLP Goal    Goal Priority Disciplines Outcome   SLP Goal     SLP Ongoing (interventions implemented as appropriate)   Description:  1.  Patient will name common objects with SB assist  2.  Patient will complete visuospatial tasks with MOD assist  3.  Patient will recall 3/3 related objects with MOD assist for use of short term memory strategies  4.  Patient will require MIN assist to name 10 objects in a simple category  5.  Patient will perform simple/moderate problem solving with MIN assist                     Plan:   Patient to be seen Therapy Frequency: 5 x/week   Planned Interventions: Cognitive-Linguistic Therapy  Plan of Care " Expires: 11/26/18  Plan of Care reviewed with: patient  SLP Follow-up?: Yes  SLP - Next Visit Date: 11/28/18           Sherin Amaral CCC-SLP 11/27/2018

## 2018-11-27 NOTE — PLAN OF CARE
Problem: SLP Goal  Goal: SLP Goal  1.  Patient will name common objects with SB assist  2.  Patient will complete visuospatial tasks with MOD assist  3.  Patient will recall 3/3 related objects with MOD assist for use of short term memory strategies--MET  4.  Patient will require MIN assist to name 10 objects in a simple category  5.  Patient will perform simple/moderate problem solving with MIN assist   6. Recall 5/5 unrelated words, following 5 minute filled delay, independently.     Progressing towards goals. POC updated to reflect progress. Continue skilled ST. Sherin Amaral MS, CCC/SLP 11/27/2018

## 2018-11-27 NOTE — PLAN OF CARE
Problem: Occupational Therapy Goal  Goal: Occupational Therapy Goal  Goals to be met by: 11/29/2018  Patient will increase functional independence with ADLs by performing:    Feeding with Modified [place].  UE Dressing with Set-up Assistance.  LE Dressing with Stand-by Assistance.  Grooming while seated with Set-up Assistance.   Shower transfer to TTB or shower chair with Beth and wall bar.  Bathing from  shower chair/bench with Minimal Assistance.  Toileting from bedside commode over toilet with Minimal Assistance for hygiene and clothing management.  Toilet transfer to bedside commode over toilet with Contact Guard Assistance.   Sitting at edge of bed x15 minutes with Stand-by Assistance while participating in ADL or exercise.    Outcome: Ongoing (interventions implemented as appropriate)  OT tx for family training.

## 2018-11-27 NOTE — PLAN OF CARE
Problem: Patient Care Overview  Goal: Plan of Care Review  Outcome: Ongoing (interventions implemented as appropriate)  Patient's strength in her left extremities is increasing.  She has some motor control in her left extremities and is able to stand on her own pushing up with her right hand.

## 2018-11-27 NOTE — PROGRESS NOTES
"REHAB FOLLOWUP NOTE    Dennis Patricio is a 68 y.o. female patient.    Chief Complaint:  Right occipital parietal acute ischemic infarct with left hemiparesis.    History:  68-year-old female initially presented to emergency department with complaints of blurred vision, left-sided weakness, unsteady gait, confusion that began 2 days prior to this hospitalization.  Patient was initially seen in the emergency room 2 days prior, noted to have elevated blood pressures and was discharged home with medications.  Patient was readmitted to hospital this time with  inability to get up and walk to the bathroom, felt she could not move her left leg and walk,patient was also unable to  objects with the left hand and felt heavy and numb.  Patient had a CT scan consistent with right posterior acute ischemic infarct, MRI consistent with  right occipital parietal ischemic infarct, carotids without any significant stenosis, echocardiogram with no thrombus or right to left shunt noted.  Patient has been medically stabilized and is transferred down to us for further rehabilitation.        Past Medical History:   Diagnosis Date    Arthritis     Back pain, chronic     Diabetes mellitus     New in 2018    Hypertension        Temp: 99 °F (37.2 °C) (11/27/18 0600)  Pulse: 86 (11/27/18 0600)  Resp: 20 (11/27/18 0600)  BP: 116/74 (11/27/18 0600)  SpO2: 96 % (11/27/18 0600)  Weight: 78.5 kg (173 lb) (11/18/18 0554)  Height: 5' 8" (172.7 cm) (11/15/18 1103)    Lab Results   Component Value Date    WBC 6.70 11/26/2018    HGB 11.4 (L) 11/26/2018    HCT 34.2 (L) 11/26/2018     (H) 11/26/2018    ALT 17 11/12/2018    AST 34 11/12/2018     11/26/2018    K 3.9 11/26/2018     11/26/2018    CREATININE 1.0 11/26/2018    BUN 16 11/26/2018    CO2 26 11/26/2018       Physical Examination:  Alert, oriented x3, voices no complaints.    Lungs are clear to auscultation.  Heart with regular rate and rhythm.  Bilateral lower " extremities without any edema or calf tenderness noted.  Left upper and lower extremity with decreased active to passive range of motion decreased motor strength.   Patient with urinary retention with intermittent voiding and high postvoid residuals.  Blood pressure controlled.  Blood sugars controlled monitor.   Steady progress.      Impression:  Status post new onset right occipital parietal acute ischemic infarct with left hemiparesis.  Acute renal failure secondary to urinary retention.  Hypertension.  Diabetes mellitus.  Degenerative joint disease.      Recommendations:  Continue current PT, OT, speech therapy and nursing care.      Dominique Brink MD  11/27/2018

## 2018-11-27 NOTE — PROGRESS NOTES
" [facility]  Adult Nutrition  Progress Note    SUMMARY     Intervention: carbohydrate/fat/and sodium modified diet + Nutrition supplement therapy-commercial beverage    Recommendation:   1)  Continue consistent carbohydrate, cardiac diet and (3-4 carb servings per meal)   2) Consider checking pt's a1C   3) Continue Ensure pudding BID    Intervention:  Modified carbohydrate diet with commercial food  Goals: 1) Pt will consume >=75% meals during admit  Nutrition Goal Status: met/ongoing  Communication of RD Recs: (POC)     Reason for Assessment     Reason for Assessment: RD follow up  Diagnosis: stroke/CVA  Relevant Medical History: DM2, HTN, Chronic back pain, arthritis  Interdisciplinary Rounds: did not attend  General Information Comments: Admitted with CVA, left hemiparesis.  Pt in therapy. No weight hx >=1 year. NFPE to be completed at follow up.  Meal intake reflects excellent appetite. (mini mental score per SLP is 14/30).  11/19/18: Pt in therapy. Spoke with nursing. Reports pt's appetite is not as good.  Noted pt ate an egg salad sandwich well last night and family brings some food "here and there".  Weight loss noted since admit, but from observation of pt in therapy, has layers of clothing including a hat.  Requesting another weight.    11/21/18 Pt continues with variable appetite but noted by RN to be drinking Boost Glucose control BID. Calorie restriction not added as pt with variable appetite, rec. 3-4 carbohydrate servings per meal. No new wt taken, reviewed with RN. Pt not available for NFPE at time of visit with perform at follow up.  11/23/18: NFPE completed per chart review. No fat/muscle wasting noted.   11/27/18: Pt alert. Spoke with pt and sister. Reports they have not had education 2/2 diabetes nutrition before.  Sister plans to be here Thursday again.  Will bring diabetic nutrition education materials then.  Notes pt's daughter will be cooking for her at home.     Nutrition " "Discharge Planning: consisent carbohydrate, cardiac diet + ONS for prn use.     Nutrition Risk Screen     Nutrition Risk Screen: no indicators present     Nutrition/Diet History     Do you have any cultural, spiritual, Islam conflicts, given your current situation?: none  Food Allergies: NKFA     Anthropometrics     Temp: 99.2 °F (37.3 °C)  Height Method: Stated  Height: 5' 8"  Height (inches): 68 in  Weight Method: Bed Scale  Weight: 78.5 kg (11/18 no new)  Weight (lb): 173 lb  Ideal Body Weight (IBW), Female: 140 lb  % Ideal Body Weight, Female (lb): 126.29 lb  BMI (Calculated): 26.9 Admission  BMI Grade: 25 - 29.9 - overweight     Lab/Procedures/Meds     Pertinent Labs Reviewed: reviewed  BMP  Lab Results   Component Value Date     11/26/2018    K 3.9 11/26/2018     11/26/2018    CO2 26 11/26/2018    BUN 16 11/26/2018    CREATININE 1.0 11/26/2018    CALCIUM 9.8 11/26/2018    ANIONGAP 10 11/26/2018    ESTGFRAFRICA >60 11/26/2018    EGFRNONAA 58 (A) 11/26/2018     POCT Glucose   Date Value Ref Range Status   11/27/2018 122 (H) 70 - 110 mg/dL Final   11/26/2018 165 (H) 70 - 110 mg/dL Final   11/25/2018 136 (H) 70 - 110 mg/dL Final     No results found for: LABA1C, HGBA1C             No results found for: CRP    Pertinent Medications Reviewed: reviewed  Pertinent Medications Comments: metformin, statin, hydrochlorothiazide, zofran, insulin     Physical Findings/Assessment     Overall Physical Appearance: overweight(per BMI)  Oral/Mouth Cavity: tooth/teeth missing  Skin: (García score 16)     Estimated/Assessed Needs   Admission  Weight Used For Calorie Calculations: 80.2 kg (176 lb 12.9 oz)  Energy Calorie Requirements (kcal): 1732  Energy Need Method: [place]  Protein Requirements: 80-96  Weight Used For Protein Calculations: 80.2 kg (176 lb 12.9 oz)  Fluid Need Method: RDA Method(or per MD)  RDA Method (mL): 1732  CHO Requirement: 45-50% EEN        Nutrition Prescription Ordered     Current " Diet Order: consistent carbohydrate, cardiac  Nutrition Order Comments: Came in with abnormal renal labs and was on  renal diet, Now resolved and renal diet discontinued.      Evaluation of Received Nutrient/Fluid Intake     Energy Calories Required: not meeting needs  Protein Required: not meeting needs  Fluid Required: not meeting needs  % Intake of Estimated Energy Needs: Average 50-75%  % Meal Intake: %     Nutrition Risk     Level of Risk/Frequency of Follow-up: (2 x wkly (2/2 need for NFPE))      Assessment and Plan      Altered nutrition related lab values r/t altered carbohydrate metabolism as evidenced by glucose of 265 with diagnosis of diabetes.  Continues     Monitor and Evaluation     Food and Nutrient Intake: energy intake  Food and Nutrient Adminstration: diet order  Anthropometric Measurements: weight, weight change  Biochemical Data, Medical Tests and Procedures: electrolyte and renal panel, inflammatory profile, glucose/endocrine profile  Nutrition-Focused Physical Findings: overall appearance, skin      Nutrition Follow-Up  yes  RD Follow-up?: Yes

## 2018-11-27 NOTE — PT/OT/SLP PROGRESS
Physical Therapy         Treatment        Dennis Patricio   MRN: [id number]     PT Received On: 11/27/18  Total Time (min): 75        Billable Minutes:  Gait [id number], Therapeutic Activity 50 and Train/Wheelchair Management 10  Total Minutes: 75    Treatment Type: Treatment  PT/PTA: PT     PTA Visit Number: 1       General Precautions: Standard, fall, vision impaired  Orthopedic Precautions: Orthopedic Precautions : N/A   Braces: Braces: N/A         Subjective:  Communicated with nurse prior to session.    Pain/Comfort  Pain Rating 1: 0/10  Pain Rating Post-Intervention 1: 0/10    Objective:  Patient found sitting up in w/c in room with her sister present for family training session. Pt's daughter and 2 grandchildren came within a few minutes of starting session to participate.      Functional Mobility:  Bed Mobility:   Supine to sit: Standby Assistance   Sit to supine: Standby Assistance   Rolling: Standby Assistance   Scooting: Standby Assistance    Transfer Training:  SPT performed w/c <> EOB transfer with pt to demonstrate technique for scoot transfers with Mod A due to pt having difficulty with scooting. Then, sit <> stand and step transfer with Min A.  Pt's daughter and pt's sister then each performed transfers as noted above.    Wheelchair Training:  Pt propelled Standard wheelchair x 100 feet on Level tile with  Right upper extremity and Right lower extremity with Stand-by Assistance and Contact Guard Assistance.     Gait Training:  Gait training on level tile with Hemiwalker with SPT providing Mod > Min A for balance, then pt's daughter and pt's sister each taking a turn assisting pt as noted, x 150 feet total.    Stair Training:  Pt's family reported pt now has a ramp at entry of home and they will assist pt with w/c in & out.      Additional Treatment:  PT recommended pt have 24/7 supervision at home and assistance at all times for transfers and gait as instructed and practiced during session today.  PT  answered family's questions, discussed equipment needs and follow up therapy. PT will provide written HEP upon discharge.    Activity Tolerance:  Patient and family tolerated treatment well    Patient left up in chair with call button in reach, chair alarm on, OT notified and family present.    Assessment:  Dennis Patricio is a 68 y.o. female with a medical diagnosis of new onset right occipital parietal acute ischemic infarct with left hemiparesis. She presents with decreased strength, decreased functional activity tolerance, impaired balance, and absent sensation to light touch on left upper and lower extremities. Pt exhibits inattention to her left side due to a left visual field cut. Pt also demonstrates impaired righting reactions; pt is aware of midline in sitting and begins to correct herself, but is not aware of midline when standing. Pt required maximum level of assistance mainly due to balance impairments because pt unable to feel her limb on the ground when standing. Pt to benefit from skilled PT rehab services to address functional impairments.        Rehab potential is fair.    Activity tolerance: Fair    Discharge recommendations: Discharge Facility/Level Of Care Needs: home, outpatient PT     Equipment recommendations: Equipment Needed After Discharge: wheelchair, walker, shona     GOALS:   Multidisciplinary Problems     Physical Therapy Goals        Problem: Physical Therapy Goal    Goal Priority Disciplines Outcome Goal Variances Interventions   Physical Therapy Goal     PT, PT/OT Ongoing (interventions implemented as appropriate)     Description:  Goals to be met by: 2018     Patient will increase functional independence with mobility by performin. Supine to sit with Minimum Assistance  2. Sit to supine with Minimum Assistance  3. Rolling to Left and Right with Minimum Assistance  4. Sit to stand transfer with Minimal Assistance  5. Bed to chair transfer with Minimal Assistance using  Rolling Walker  6. Gait  x 150 feet with Minimal Assistance using Rolling Walker.   7. Wheelchair propulsion x 200 feet with Stand-by Assistance using right upper and lower extremities.  8. Ascend/descend 4 steps with bilateral Handrails Minimal Assistance.                    PLAN:    Patient to be seen daily  to address the above listed problems via gait training, therapeutic activities, therapeutic exercises, therapeutic groups, neuromuscular re-education, wheelchair management/training  Plan of Care expires: 11/29/18  Plan of Care reviewed with: patient, daughter, sibling    I certify that I was present in the room directing the student JORDI Peacock in service delivery and guiding them using my skilled judgment.          Raquel Almaguer, PT 11/27/2018

## 2018-11-28 LAB — POCT GLUCOSE: 79 MG/DL (ref 70–110)

## 2018-11-28 PROCEDURE — 92507 TX SP LANG VOICE COMM INDIV: CPT

## 2018-11-28 PROCEDURE — 12800000 HC REHAB SEMI-PRIVATE ROOM

## 2018-11-28 PROCEDURE — 97535 SELF CARE MNGMENT TRAINING: CPT

## 2018-11-28 PROCEDURE — 25000003 PHARM REV CODE 250: Performed by: PHYSICAL MEDICINE & REHABILITATION

## 2018-11-28 PROCEDURE — 63600175 PHARM REV CODE 636 W HCPCS: Performed by: PHYSICAL MEDICINE & REHABILITATION

## 2018-11-28 PROCEDURE — 97112 NEUROMUSCULAR REEDUCATION: CPT

## 2018-11-28 PROCEDURE — 97127 HC THERAPEUTIC INTVTN, COGN FUNCTION - ST: CPT

## 2018-11-28 RX ORDER — AMLODIPINE BESYLATE 10 MG/1
10 TABLET ORAL DAILY
Qty: 30 TABLET | Refills: 0 | Status: SHIPPED | OUTPATIENT
Start: 2018-11-29 | End: 2019-01-31 | Stop reason: SDUPTHER

## 2018-11-28 RX ORDER — NAPROXEN SODIUM 220 MG/1
81 TABLET, FILM COATED ORAL DAILY
Refills: 0 | COMMUNITY
Start: 2018-11-29 | End: 2019-11-29

## 2018-11-28 RX ORDER — TAMSULOSIN HYDROCHLORIDE 0.4 MG/1
0.4 CAPSULE ORAL DAILY
Qty: 30 CAPSULE | Refills: 0 | Status: SHIPPED | OUTPATIENT
Start: 2018-11-29 | End: 2019-11-29

## 2018-11-28 RX ORDER — LISINOPRIL 20 MG/1
20 TABLET ORAL NIGHTLY
Qty: 30 TABLET | Refills: 0 | Status: SHIPPED | OUTPATIENT
Start: 2018-11-28 | End: 2019-01-31 | Stop reason: SDUPTHER

## 2018-11-28 RX ORDER — ATORVASTATIN CALCIUM 80 MG/1
80 TABLET, FILM COATED ORAL NIGHTLY
Qty: 30 TABLET | Refills: 0 | Status: SHIPPED | OUTPATIENT
Start: 2018-11-28 | End: 2019-01-31 | Stop reason: SDUPTHER

## 2018-11-28 RX ORDER — HYDROCHLOROTHIAZIDE 12.5 MG/1
12.5 CAPSULE ORAL DAILY
Qty: 30 CAPSULE | Refills: 0 | Status: SHIPPED | OUTPATIENT
Start: 2018-11-28 | End: 2019-01-31 | Stop reason: SDUPTHER

## 2018-11-28 RX ORDER — CLOPIDOGREL BISULFATE 75 MG/1
75 TABLET ORAL DAILY
Qty: 30 TABLET | Refills: 0 | Status: SHIPPED | OUTPATIENT
Start: 2018-11-29 | End: 2019-01-31 | Stop reason: SDUPTHER

## 2018-11-28 RX ORDER — METFORMIN HYDROCHLORIDE 500 MG/1
1000 TABLET, EXTENDED RELEASE ORAL
Qty: 30 TABLET | Refills: 0 | Status: SHIPPED | OUTPATIENT
Start: 2018-11-28 | End: 2019-01-31 | Stop reason: SDUPTHER

## 2018-11-28 RX ORDER — ACETAMINOPHEN 325 MG/1
650 TABLET ORAL EVERY 6 HOURS PRN
Refills: 0 | COMMUNITY
Start: 2018-11-28

## 2018-11-28 RX ORDER — GLIPIZIDE 2.5 MG/1
2.5 TABLET, EXTENDED RELEASE ORAL
Qty: 30 TABLET | Refills: 0 | Status: SHIPPED | OUTPATIENT
Start: 2018-11-29 | End: 2018-11-29 | Stop reason: HOSPADM

## 2018-11-28 RX ADMIN — GLIPIZIDE 2.5 MG: 2.5 TABLET, FILM COATED, EXTENDED RELEASE ORAL at 09:11

## 2018-11-28 RX ADMIN — CLOPIDOGREL BISULFATE 75 MG: 75 TABLET ORAL at 09:11

## 2018-11-28 RX ADMIN — TAMSULOSIN HYDROCHLORIDE 0.4 MG: 0.4 CAPSULE ORAL at 09:11

## 2018-11-28 RX ADMIN — METFORMIN HYDROCHLORIDE 1000 MG: 500 TABLET, EXTENDED RELEASE ORAL at 04:11

## 2018-11-28 RX ADMIN — ATORVASTATIN CALCIUM 80 MG: 40 TABLET, FILM COATED ORAL at 08:11

## 2018-11-28 RX ADMIN — AMLODIPINE BESYLATE 10 MG: 5 TABLET ORAL at 09:11

## 2018-11-28 RX ADMIN — ENOXAPARIN SODIUM 40 MG: 100 INJECTION SUBCUTANEOUS at 04:11

## 2018-11-28 RX ADMIN — LISINOPRIL 20 MG: 10 TABLET ORAL at 08:11

## 2018-11-28 RX ADMIN — ASPIRIN 81 MG CHEWABLE TABLET 81 MG: 81 TABLET CHEWABLE at 09:11

## 2018-11-28 RX ADMIN — POLYETHYLENE GLYCOL 3350 17 G: 17 POWDER, FOR SOLUTION ORAL at 09:11

## 2018-11-28 RX ADMIN — ONDANSETRON 4 MG: 4 TABLET, ORALLY DISINTEGRATING ORAL at 08:11

## 2018-11-28 RX ADMIN — HYDROCHLOROTHIAZIDE 12.5 MG: 12.5 CAPSULE ORAL at 02:11

## 2018-11-28 NOTE — PROGRESS NOTES
"REHAB FOLLOWUP NOTE    Dennis Patricio is a 68 y.o. female patient.    Chief Complaint:  Right occipital parietal acute ischemic infarct with left hemiparesis.    History:  68-year-old female initially presented to emergency department with complaints of blurred vision, left-sided weakness, unsteady gait, confusion that began 2 days prior to this hospitalization.  Patient was initially seen in the emergency room 2 days prior, noted to have elevated blood pressures and was discharged home with medications.  Patient was readmitted to hospital this time with  inability to get up and walk to the bathroom, felt she could not move her left leg and walk,patient was also unable to  objects with the left hand and felt heavy and numb.  Patient had a CT scan consistent with right posterior acute ischemic infarct, MRI consistent with  right occipital parietal ischemic infarct, carotids without any significant stenosis, echocardiogram with no thrombus or right to left shunt noted.  Patient has been medically stabilized and is transferred down to us for further rehabilitation.        Past Medical History:   Diagnosis Date    Arthritis     Back pain, chronic     Diabetes mellitus     New in 2018    Hypertension        Temp: 99.4 °F (37.4 °C) (11/28/18 0711)  Pulse: 68 (11/28/18 0711)  Resp: 18 (11/28/18 0711)  BP: 127/73 (11/28/18 0711)  SpO2: 96 % (11/28/18 0711)  Weight: 78.5 kg (173 lb) (11/18/18 0554)  Height: 5' 8" (172.7 cm) (11/15/18 1103)    Lab Results   Component Value Date    WBC 6.70 11/26/2018    HGB 11.4 (L) 11/26/2018    HCT 34.2 (L) 11/26/2018     (H) 11/26/2018    ALT 17 11/12/2018    AST 34 11/12/2018     11/26/2018    K 3.9 11/26/2018     11/26/2018    CREATININE 1.0 11/26/2018    BUN 16 11/26/2018    CO2 26 11/26/2018       Physical Examination:  Alert, oriented x3, voices no complaints.    Lungs are clear to auscultation.  Heart with regular rate and rhythm.  Bilateral lower " extremities without any edema or calf tenderness noted.  Left upper and lower extremity with decreased active to passive range of motion decreased motor strength.   Patient with urinary retention with intermittent voiding and high postvoid residuals.  Will go ahead and inserted the Watkins catheter in.  Blood pressure controlled.  Blood sugars controlled monitor.   Steady progress.      Impression:  Status post new onset right occipital parietal acute ischemic infarct with left hemiparesis.  Acute renal failure secondary to urinary retention.  Hypertension.  Diabetes mellitus.  Degenerative joint disease.      Recommendations:  Continue current PT, OT, speech therapy and nursing care.  DC home tomorrow with family.    Dominique Brink MD  11/28/2018

## 2018-11-28 NOTE — PROGRESS NOTES
Team conference attended and patient discussed. Spoke with patient to discuss discharge plans in place for tomorrow.  Family advised of all discharge plans. SW will follow and assist with discharge plans as needed.

## 2018-11-28 NOTE — PLAN OF CARE
Problem: Patient Care Overview  Goal: Plan of Care Review   Resting quietly during rounding. No c/o pain.  No pressure ulcer development to date. Bladder scanned,825 ML scanned. 650 returned on straight cath. L sided hemiparesis,  assisted with ADL's. Nausea controlled with PRN anti emetics.Accu check this AM 79. Will continue to monitor.

## 2018-11-28 NOTE — PT/OT/SLP PROGRESS
Physical Therapy         Treatment        Dennis Patricio   MRN: [id number]     PT Received On: 11/28/18  Total Time (min): 75        Billable Minutes:  Gait [id number], Therapeutic Activity 25, Therapeutic Exercise 20 and Train/Wheelchair Management 15  Total Minutes: 75    Treatment Type: Treatment  PT/PTA: PT(with SPT)     PTA Visit Number: 1       General Precautions: Standard, vision impaired, fall  Orthopedic Precautions: Orthopedic Precautions : N/A   Braces:         Subjective:  Pt found sleeping in room, but agreeable to participate in PT.   Pain/Comfort  Pain Rating 1: 0/10  Objective:  Patient found supine in bed, with Patient found with: de la cruz catheter    Functional Mobility:  Bed Mobility:   Supine to sit: Standby Assistance   Sit to supine: Activity did not occur   Rolling: Standby Assistance   Scooting: Standby Assistance    Transfer Training:  Sit to stand: Moderate Assistance with shona-walker.  Bed <> Chair:  Step Transfer with Moderate Assistance with No Assistive Device and verbal cues for safety.    Wheelchair Training:  Pt propelled Standard wheelchair x 175 feet on Level tile with  Right upper extremity and Right lower extremity with Minimal Assistance.     Gait Training:  Patient gait trained FWB/WBAT: bilateral lower extremity 25  feet on level tile with Hemiwalker with Moderate Assistance.     Additional Treatment:  Pt performed B LE there ex sitting x 30 reps with 3# wt and red TB, with vc for proper execution and joint protection: ap, laq, marching, hip abd/add, ham curls.     SciFit x 15 minutes     PT donned pt's pants in supine in bed, with pt assisting by performing a bridge to lift her bottom off of the bed.     Activity Tolerance:  Patient tolerated treatment well and limited by impaired sensation of L LE.    Patient left up in chair with Caterina AGUILAR, in rehab gym..    Assessment:  Dennis Patricio is a 68 y.o. female with a medical diagnosis of new onset right occipital parietal  acute ischemic infarct with left hemiparesis. She presents with decreased strength, decreased functional activity tolerance, impaired balance, and absent sensation to light touch on left upper and lower extremities.Pt to benefit from skilled PT rehab services to address functional impairments. Pt gait distance limited by impaired balance due to pt's inability to feel her left foot on the ground today.     Rehab potential is fair.    Activity tolerance: Fair    Discharge recommendations: Discharge Facility/Level Of Care Needs: home, outpatient PT     Equipment recommendations: Equipment Needed After Discharge: (TBD)     GOALS:   Multidisciplinary Problems     Physical Therapy Goals        Problem: Physical Therapy Goal    Goal Priority Disciplines Outcome Goal Variances Interventions   Physical Therapy Goal     PT, PT/OT Ongoing (interventions implemented as appropriate)     Description:  Goals to be met by: 2018     Patient will increase functional independence with mobility by performin. Supine to sit with Minimum Assistance  2. Sit to supine with Minimum Assistance  3. Rolling to Left and Right with Minimum Assistance  4. Sit to stand transfer with Minimal Assistance  5. Bed to chair transfer with Minimal Assistance using Rolling Walker  6. Gait  x 150 feet with Minimal Assistance using Rolling Walker.   7. Wheelchair propulsion x 200 feet with Stand-by Assistance using right upper and lower extremities.  8. Ascend/descend 4 steps with bilateral Handrails Minimal Assistance.                    PLAN:    Patient to be seen daily  to address the above listed problems via gait training, therapeutic activities, therapeutic exercises, therapeutic groups, neuromuscular re-education, wheelchair management/training  Plan of Care expires: 18  Plan of Care reviewed with: patient       I certify that I was present in the room directing the student, Sugey Swift, in service delivery and guiding them using my  skilled judgment. As the co-signing therapist I have reviewed the students documentation and am responsible for the treatment, assessment, and plan.       Sugey Swift, SPT 11/28/2018

## 2018-11-28 NOTE — PATIENT CARE CONFERENCE
Weekly Staffing Report      Date Admitted: 11/9/2018 :   Staffing Date: 11/28/2018     Patient Active Problem List   Diagnosis    CVA (cerebral vascular accident)          Team Members Present:  Physician Team Member: Dr Brink  Nursing Team Member: Alek Muro RN  Case Management Team Member: Ana Paula Mulligan CM/SW  PT Team Member: Raquel Almageur PT  OT Team Member: Caterina Navas OT  SLP Team Member: Vasile KO  Other (Discipline and Name): Clare Bradford RD    Nursing  Skin: Intact  Bowel: Continent  Bladder:in/out catheter  Diet: diabetic, 1800 calorie  Appetite: good   Pain Level: 0/10    Physical Therapy  Supine to Sit:  standy by assistance  Sit to Stand: minimal assist  Gait:  feet minimal assist Raza walker  Wheelchair Mobility: 200 feet contact guard  Stairs: N/A      Occupational Therapy  Feeding: set up  Grooming:minimal assist  UED: minimal assist  LED: moderate assist  Bathing:minimal assist   Toileting:minimal assist  Toilet Transfer:  moderate assist  Tub Transfer:  moderate assist      Speech Therapy  Swallow: WFL.  MMS: 15/30  Memory: standy by assistance  Receptive Language: modified independent  Expressive Language: modified independent  Problem solving: minimal assist            Goals:  Min. asst to SBA.      Tolerates 3 hours of therapy: Yes    Discharge Destination: Home with out pt.    Estimated Length of Stay: 11/29/18.

## 2018-11-28 NOTE — PLAN OF CARE
Problem: Physical Therapy Goal  Goal: Physical Therapy Goal  Goals to be met by: 2018     Patient will increase functional independence with mobility by performin. Supine to sit with Minimum Assistance  2. Sit to supine with Minimum Assistance  3. Rolling to Left and Right with Minimum Assistance  4. Sit to stand transfer with Minimal Assistance  5. Bed to chair transfer with Minimal Assistance using Rolling Walker  6. Gait  x 150 feet with Minimal Assistance using Rolling Walker.   7. Wheelchair propulsion x 200 feet with Stand-by Assistance using right upper and lower extremities.  8. Ascend/descend 4 steps with bilateral Handrails Minimal Assistance.   Outcome: Ongoing (interventions implemented as appropriate)  PT for gait training, wheelchair mobility, therapeutic exercise and therapeutic activity.

## 2018-11-28 NOTE — DISCHARGE INSTRUCTIONS
Referral made to [facility] for out-patient PT, OT and Speech therapies.  Your first appt will be on Monday, 12/3/18 at 1:00 for OT, 2:00 for Speech and 3:00 for PT.   Their phone # is [phone].    Bedside commode ordered from [employer] for home use. Their phone # is [phone].    Wheelchair and shona-walker ordered from [employer] for home use. Their phone # [phone].      Follow up with your doctors.  Be cautious with transfers and walking as you are still at risk for falls.

## 2018-11-28 NOTE — PT/OT/SLP PROGRESS
Occupational Therapy  Treatment    Dennis Patricio   MRN: [id number]   Admitting Diagnosis: New onset right occipital parietal acute ischemic infarct with left hemiparesis.     OT Date of Treatment: 11/28/18   Total Time (min): 80 min      Billable Minutes:  Self Care/Home Management 15 and Neuromuscular Re-education 65  Total Minutes: 80    General Precautions: Standard, fall, diabetic, vision impaired  Orthopedic Precautions: N/A  Braces: N/A    Do you have any cultural, spiritual, Shinto conflicts, given your current situation?: none    Subjective:  Communicated with nurse prior to session.  Pt's status reported to staff in weekly team meeting.  Discharge to home remains for tomorrow, 11/29/2018.    Pain Rating 1: 0/10                   Objective:  Patient found with: (w/c alarm)    Functional Mobility:  Transfer Training:   w/c<>toilet with mod to Margie and verbal instructions for sequence and assist to LUE for placement.    Toilet Training:  Margie with pt able to support self in standing with RUE on the wall bar and LUE managing pants up and down part way and with greater success in pushing them down.  She required Mod/Margie for standing balance when she released the RUE for clothing management.    Balance:   Mod to Margie in standing while engaging UEs and modA when performing step turn transfer.    Additional Treatment:  Pt engaged in bilateral UE gross motor coordination exercises of moving the LUE in sinc with the RUE together in various planes of movement and matching palms of hands together in positions of up, down and to the right and the left.  Also engaged in tasks of right and left discrimination and crossing of midline with bilateral UEs.  Pt engaged in table top task of manipulating playing cards on table with picking them up and turning them over and placing them is various spots on the table top.    Patient left up in chair with call button in reach and chair alarm on.    ASSESSMENT:  Dennis Lopez  Sintia demonstrated improved standing balance and incorporation of the LUE and hand in LE clothing management and in performance of leisure activity.  General accuracy of the L hand in accurate placement in gross motor tasks is improved along with improved eye/hand coordination. Improved manipulation and pinch patterns in finer hand manipulation tasks noted.  Pt continues with reported blurred vision and requires assist verbally, tactilely or both at times to locate items or place of target reach.  Pt also has demonstrated increased attention to task and self expectation for improved LUE control of movement.    Rehab potential is good    Activity tolerance: Good       Discharge recommendations: home, outpatient OT     Equipment recommendations: 3-in-1 commode, bath bench, wheelchair     GOALS:   Multidisciplinary Problems     Occupational Therapy Goals        Problem: Occupational Therapy Goal    Goal Priority Disciplines Outcome Interventions   Occupational Therapy Goal     OT, PT/OT Ongoing (interventions implemented as appropriate)    Description:  Goals to be met by: 11/29/2018  Patient will increase functional independence with ADLs by performing:    Feeding with Modified [place].  UE Dressing with Set-up Assistance.  LE Dressing with Stand-by Assistance.  Grooming while seated with Set-up Assistance.   Shower transfer to TTB or shower chair with Beth and wall bar.  Bathing from  shower chair/bench with Minimal Assistance.  Toileting from bedside commode over toilet with Minimal Assistance for hygiene and clothing management.  Toilet transfer to bedside commode over toilet with Contact Guard Assistance.   Sitting at edge of bed x15 minutes with Stand-by Assistance while participating in ADL or exercise.                     Plan:  Patient to be seen 6 x/week to address the above listed problems via self-care/home management, community/work re-entry, therapeutic activities, therapeutic exercises, therapeutic  groups, neuromuscular re-education, cognitive retraining, sensory integration, wheelchair management/training  Plan of Care expires: 11/29/18  Plan of Care reviewed with: patient         Caterina Navas, OT  11/28/2018

## 2018-11-29 VITALS
HEART RATE: 94 BPM | TEMPERATURE: 98 F | HEIGHT: 68 IN | WEIGHT: 173 LBS | RESPIRATION RATE: 18 BRPM | OXYGEN SATURATION: 99 % | SYSTOLIC BLOOD PRESSURE: 104 MMHG | BODY MASS INDEX: 26.22 KG/M2 | DIASTOLIC BLOOD PRESSURE: 64 MMHG

## 2018-11-29 LAB — POCT GLUCOSE: 89 MG/DL (ref 70–110)

## 2018-11-29 PROCEDURE — 97803 MED NUTRITION INDIV SUBSEQ: CPT

## 2018-11-29 PROCEDURE — 97535 SELF CARE MNGMENT TRAINING: CPT

## 2018-11-29 PROCEDURE — 97116 GAIT TRAINING THERAPY: CPT

## 2018-11-29 PROCEDURE — 25000003 PHARM REV CODE 250: Performed by: PHYSICAL MEDICINE & REHABILITATION

## 2018-11-29 PROCEDURE — 97530 THERAPEUTIC ACTIVITIES: CPT

## 2018-11-29 RX ADMIN — CLOPIDOGREL BISULFATE 75 MG: 75 TABLET ORAL at 09:11

## 2018-11-29 RX ADMIN — ASPIRIN 81 MG CHEWABLE TABLET 81 MG: 81 TABLET CHEWABLE at 09:11

## 2018-11-29 RX ADMIN — HYDROCHLOROTHIAZIDE 12.5 MG: 12.5 CAPSULE ORAL at 11:11

## 2018-11-29 RX ADMIN — TAMSULOSIN HYDROCHLORIDE 0.4 MG: 0.4 CAPSULE ORAL at 09:11

## 2018-11-29 NOTE — PLAN OF CARE
Problem: Nutrition, Imbalanced: Inadequate Oral Intake (Adult)  Goal: Improved Oral Intake  Patient will demonstrate the desired outcomes by discharge/transition of care.  ntervention: carbohydrate/fat/and sodium modified diet + Nutrition supplement therapy-commercial food product    Recommendation:   1)  Continue consistent carbohydrate, cardiac diet and (3-4 carb servings per meal)   2) Consider checking pt's a1C   3) Continue Ensure pudding BID    Intervention:  Modified carbohydrate diet with commercial food.    Nutrition education provided.    Goals: 1) Pt will consume >=75% meals during admit  Nutrition Goal Status: met/ongoing  Communication of RD Recs: (POC, education tab)

## 2018-11-29 NOTE — DISCHARGE SUMMARY
[facility]  Physical Medicine & Rehab  Discharge Summary    Patient Name: Dennis Patricio  MRN: [id number]  Admission Date: 11/9/2018  Hospital Length of Stay: 20 days  Discharge Date and Time:  11/29/2018   Attending Physician: Dominique Brink MD  Discharging Provider: Dominique Brink MD  Primary Care Physician: Primary Doctor No      Admit Diagnosis:  CVA (cerebral vascular accident) [I63.9]    Discharge Diagnosis:  New onset right occipital, parietal acute ischemic infarct with left hemiparesis.    Secondary Diagnosis:   Active Hospital Problems    Diagnosis  POA    CVA (cerebral vascular accident) [I63.9]  Yes      Resolved Hospital Problems   No resolved problems to display.       HPI:  68-year-old female initially presented to emergency department with complaints of blurred vision, left-sided weakness, unsteady gait, confusion that began 2 days prior to this hospitalization.  Patient was initially seen in the emergency room 2 days prior, noted to have elevated blood pressures and was discharged home with medications.  Patient was readmitted to hospital this time with  inability to get up and walk to the bathroom, felt she could not move her left leg and walk,patient was also unable to  objects with the left hand and felt heavy and numb.  Patient had a CT scan consistent with right posterior acute ischemic infarct, MRI consistent with  right occipital parietal ischemic infarct, carotids without any significant stenosis, echocardiogram with no thrombus or right to left shunt noted.  Patient has been medically stabilized and is transferred down to us for further rehabilitation.        Past Medical History:   Diagnosis Date    Arthritis     Back pain, chronic     Diabetes mellitus     New in 2018    Hypertension        Hospital Course:  Patient's course on the rehabilitation unit has been with steady progress.  Patient's left sided weakness much improved.  Left neglect and  "visual field deficits have been improving.  Patient's blood pressure and blood sugars were elevated and had to adjust the medications initially and where well control by the time of discharge.  Patient presented with urinary retention inserted Watkins initially tried voiding trial with not much success, decided to go ahead and leave the Watkins catheter in on discharge.  Patient also had a urinary tract infection was treated for the same.  Therapy wise patient was needing standby assistance with bed mobility, memory, minimal assistance with transfers, ambulating 100 ft with a raza walker, grooming, upper extremity dressing, bathing, toileting, problem solving. Patient was needing contact guard assistance with wheelchair mobility about 200 ft, setup with feeding, moderate assistance with lower extremity dressing, toilet transfers, tub transfers, and modified independent with receptive and expressive language.  Patient's family participated in the training.  Patient is discharged home today with  outpatient PT, OT, speech therapy.  Patient to have a follow-up appointment with PCP and neurologist as recommended.  Follow-up with Dr. Layne    Discharged Condition: good    Disposition:  Home with outpatient therapy.    Follow Up:  Follow-up Information     Primary Doctor No.                 Patient Instructions:      WALKER FOR HOME USE     Order Specific Question Answer Comments   Type of Walker: Raza    With wheels? No    Height: 5' 8" (1.727 m)    Weight: 78.5 kg (173 lb)    Length of need (1-99 months): 99    Does patient have medical equipment at home? cane, straight    Please check all that apply: Patient's condition impairs ambulation.    Please check all that apply: Patient is unable to safely ambulate without equipment.    Please check all that apply: Walker will be used for gait training.      WHEELCHAIR FOR HOME USE     Order Specific Question Answer Comments   Hours in W/C per day: 8    Type of Wheelchair: " "Standard    Size(Width): 18"(STD adult)    Leg Support: STD footrests    Lap Belt: Velcro    Cushion: Basic    Height: 5' 8" (1.727 m)    Weight: 78.5 kg (173 lb)    Does patient have medical equipment at home? cane, straight    Length of need (1-99 months): 12    Please check all that apply: Caregiver is capable and willing to operate wheelchair safely.    Please check all that apply: Patient's upper body strength is sufficient for propulsion.    Please check all that apply: The patient requires the use of a w/c for activities of daily living within the Home.    Please check all that apply: Patient mobility limitations cannot be sufficiently resolved by the use of other ambulatory therapies.      3 IN 1 COMMODE FOR HOME USE     Order Specific Question Answer Comments   Type: Standard    Height: 5' 8" (1.727 m)    Weight: 78.5 kg (173 lb)    Does patient have medical equipment at home? cane, straight    Length of need (1-99 months): 99      Referral to OutPatient  Physical therapy   Referral Priority: Routine Referral Type: Physical Medicine   Referral Reason: Specialty Services Required   Requested Specialty: Physical Therapy   Number of Visits Requested: 1     Referral to Outpatient Occupational therapy   Referral Priority: Routine Referral Type: Occupational Therapy   Referral Reason: Specialty Services Required   Requested Specialty: Occupational Therapy   Number of Visits Requested: 1     Referral to OutPatient Speech Therapy   Referral Priority: Routine Referral Type: Speech Therapy   Referral Reason: Specialty Services Required   Requested Specialty: Speech Pathology   Number of Visits Requested: 1       Medications:  Reconciled Home Medications:      Medication List      START taking these medications    acetaminophen 325 MG tablet  Commonly known as:  TYLENOL  Take 2 tablets (650 mg total) by mouth every 6 (six) hours as needed.     amLODIPine 10 MG tablet  Commonly known as:  NORVASC  Take 1 tablet (10 mg " total) by mouth once daily.     aspirin 81 MG Chew  Take 1 tablet (81 mg total) by mouth once daily.     atorvastatin 80 MG tablet  Commonly known as:  LIPITOR  Take 1 tablet (80 mg total) by mouth every evening.     clopidogrel 75 mg tablet  Commonly known as:  PLAVIX  Take 1 tablet (75 mg total) by mouth once daily.     hydroCHLOROthiazide 12.5 mg capsule  Commonly known as:  MICROZIDE  Take 1 capsule (12.5 mg total) by mouth once daily.     metFORMIN 500 MG 24 hr tablet  Commonly known as:  GLUCOPHAGE-XR  Take 2 tablets (1,000 mg total) by mouth before dinner.     tamsulosin 0.4 mg Cap  Commonly known as:  FLOMAX  Take 1 capsule (0.4 mg total) by mouth once daily.        CHANGE how you take these medications    lisinopril 20 MG tablet  Commonly known as:  PRINIVIL,ZESTRIL  Take 1 tablet (20 mg total) by mouth every evening.  What changed:    · medication strength  · how much to take  · when to take this            Time spent on the discharge of patient: 20 minutes        Dominique Brink MD  Department of Physical Medicine & Rehab  [facility]

## 2018-11-29 NOTE — PROGRESS NOTES
" [facility]  Adult Nutrition  Progress Note    SUMMARY     Intervention: carbohydrate/fat/and sodium modified diet + Nutrition supplement therapy-commercial food product    Recommendation:   1)  Continue consistent carbohydrate, cardiac diet and (3-4 carb servings per meal)   2) Consider checking pt's a1C   3) Continue Ensure pudding BID    Intervention:  Modified carbohydrate diet with commercial food.    Nutrition education provided.    Goals: 1) Pt will consume >=75% meals during admit  Nutrition Goal Status: met/ongoing  Communication of RD Recs: (POC, education tab)     Reason for Assessment     Reason for Assessment: RD follow up  Diagnosis: stroke/CVA  Relevant Medical History: DM2, HTN, Chronic back pain, arthritis  Interdisciplinary Rounds: attendedGeneral Information Comments: Admitted with CVA, left hemiparesis.  Pt in therapy. No weight hx >=1 year. NFPE to be completed at follow up.  Meal intake reflects excellent appetite. (mini mental score per SLP is 14/30).  11/19/18: Pt in therapy. Spoke with nursing. Reports pt's appetite is not as good.  Noted pt ate an egg salad sandwich well last night and family brings some food "here and there".  Weight loss noted since admit, but from observation of pt in therapy, has layers of clothing including a hat.  Requesting another weight.    11/21/18 Pt continues with variable appetite but noted by RN to be drinking Boost Glucose control BID. Calorie restriction not added as pt with variable appetite, rec. 3-4 carbohydrate servings per meal. No new wt taken, reviewed with RN. Pt not available for NFPE at time of visit with perform at follow up.  11/23/18: NFPE completed per chart review. No fat/muscle wasting noted.   11/27/18: Pt alert. Spoke with pt and sister. Reports they have not had education 2/2 diabetes nutrition before.  Sister plans to be here Thursday again.  Will bring diabetic nutrition education materials then.  Notes pt's daughter " "will be cooking for her at home.   11/29/18: Pt alert. Sister nor daughter present.  Provided introduction to diabetic nutrition re foods that are carbs.  Pt can name at least 5 general foods high in carbs. Reports she has stopped drinking "cold drinks" and uses water instead now.  Left handout and contact information with pt.     Nutrition Discharge Planning: consisent carbohydrate, cardiac diet + ONS for prn use.     Nutrition Risk Screen     Nutrition Risk Screen: no indicators present     Nutrition/Diet History     Do you have any cultural, spiritual, Advent conflicts, given your current situation?: none  Food Allergies: NKFA     Anthropometrics     Temp: 99.2 °F (37.3 °C)  Height Method: Stated  Height: 5' 8"  Height (inches): 68 in  Weight Method: Bed Scale  Weight: 78.5 kg (11/18 no new)  Weight (lb): 173 lb  Ideal Body Weight (IBW), Female: 140 lb  % Ideal Body Weight, Female (lb): 126.29 lb  BMI (Calculated): 26.9 Admission  BMI Grade: 25 - 29.9 - overweight     Lab/Procedures/Meds     Pertinent Labs Reviewed: reviewed  BMP  Lab Results   Component Value Date     11/26/2018    K 3.9 11/26/2018     11/26/2018    CO2 26 11/26/2018    BUN 16 11/26/2018    CREATININE 1.0 11/26/2018    CALCIUM 9.8 11/26/2018    ANIONGAP 10 11/26/2018    ESTGFRAFRICA >60 11/26/2018    EGFRNONAA 58 (A) 11/26/2018     POCT Glucose   Date Value Ref Range Status   11/29/2018 89 70 - 110 mg/dL Final   11/28/2018 79 70 - 110 mg/dL Final   11/27/2018 122 (H) 70 - 110 mg/dL Final     No results found for: LABA1C, HGBA1C             No results found for: CRP    Pertinent Medications Reviewed: reviewed  Pertinent Medications Comments: metformin, statin, hydrochlorothiazide, zofran, insulin     Physical Findings/Assessment     Overall Physical Appearance: overweight(per BMI)  Oral/Mouth Cavity: tooth/teeth missing  Skin: (García score 16)     Estimated/Assessed Needs   Admission  Weight Used For Calorie Calculations: 80.2 kg " (176 lb 12.9 oz)  Energy Calorie Requirements (kcal): 1732  Energy Need Method: Young-St Jeor  Protein Requirements: 80-96  Weight Used For Protein Calculations: 80.2 kg (176 lb 12.9 oz)  Fluid Need Method: RDA Method(or per MD)  RDA Method (mL): 1732  CHO Requirement: 45-50% EEN        Nutrition Prescription Ordered     Current Diet Order: consistent carbohydrate, cardiac  Nutrition Order Comments: Came in with abnormal renal labs and was on  renal diet, Now resolved and renal diet discontinued.      Evaluation of Received Nutrient/Fluid Intake     Energy Calories Required: not meeting needs  Protein Required: not meeting needs  Fluid Required: not meeting needs  % Intake of Estimated Energy Needs: Average 50-75%  11/29/18: (average meal intake 50% plus using Ensure pudding 2 x daily = ~1240 calories daily based on last 6 documented meals. Slight weight loss noted. Will continue to monitor weight trends.   % Meal Intake: %     Nutrition Risk     Level of Risk/Frequency of Follow-up: (1 x wkly)   Assessment and Plan      Altered nutrition related lab values r/t altered carbohydrate metabolism as evidenced by glucose of 265 with diagnosis of diabetes.  Improving     Monitor and Evaluation     Food and Nutrient Intake: energy intake  Food and Nutrient Adminstration: diet order  Anthropometric Measurements: weight, weight change  Biochemical Data, Medical Tests and Procedures: electrolyte and renal panel, inflammatory profile, glucose/endocrine profile  Nutrition-Focused Physical Findings: overall appearance, skin      Nutrition Follow-Up  yes  RD Follow-up?: Yes

## 2018-11-29 NOTE — PT/OT/SLP PROGRESS
Speech Language Pathology Treatment          Dennis Patricio   MRN: [id number]     SLP Treatment Date: 11/28/18  Speech Start Time: 1605     Speech Stop Time: 1635     Speech Total (min): 30 min       TREATMENT BILLABLE MINUTES:  Speech Therapy Individual 30 and Total Time 30    General Precautions: Standard, diabetic, fall, vision impaired  Current Respiratory Status: room air       Subjective:  Pt alert and cooperative     Objective:   Patient found upright in wheelchair w/ alarm and belt. Assisted into SLP treatment room. Pt unable to complete simple word problems re: time. Given Pt presented with poor retention of information/working memory within task, initiated mental manipulation of 3 similar items. Pt completed task with 60% acc increasing to 100% acc w/ repetition. She demonstrated excellent auditory comprehension and retention of information as she was able to answer ?? following verbal presentation of 2 sent paragraph with 90% acc.      Pain/Comfort  Pain Rating 1: 0/10  Assessment:  Dennis Patricio is a 68 y.o. female with a SLP diagnosis of Cognitive-Linguistic Impairment.       Discharge recommendations: Discharge Facility/Level Of Care Needs: home, outpatient speech therapy     Goals:   Multidisciplinary Problems     SLP Goals        Problem: SLP Goal    Goal Priority Disciplines Outcome   SLP Goal     SLP Ongoing (interventions implemented as appropriate)   Description:  1.  Patient will name common objects with SB assist  2.  Patient will complete visuospatial tasks with MOD assist  3.  Patient will recall 3/3 related objects with MOD assist for use of short term memory strategies--MET  4.  Patient will require MIN assist to name 10 objects in a simple category  5.  Patient will perform simple/moderate problem solving with MIN assist   6. Recall 5/5 unrelated words, following 5 minute filled delay, independently.                      Plan:   Patient to be seen Therapy Frequency: 5 x/week   Planned  Interventions: Cognitive-Linguistic Therapy  Plan of Care Expires: 11/26/18  Plan of Care reviewed with: patient  SLP Follow-up?: Yes  SLP - Next Visit Date: 11/28/18           Vasile Cook CCC-SLP 11/29/2018

## 2018-11-29 NOTE — NURSING
Pt & daughter given verbalized & written D/C & Watkins catheter care instructions w/ verbalized understanding. Reminded to find a Primary care MD & Urologist very soon- given some names & phone numbers as  Suggestions. Assisted pt safely into vehicle w/ daughter to drive.

## 2018-11-29 NOTE — PT/OT/SLP DISCHARGE
Occupational Therapy Discharge Summary    Dennis Patricio  MRN: [id number]   Principal Problem:   New onset right occipital parietal acute ischemic infarct with left hemiparesis.        OT Date of Treatment: 11/29/18   Total Time (min): 40 min        Billable Minutes:  Self Care/Home Management 40   Total Minutes: 40    Pt treatment for reassessment of self care skills.  On first attempt she was placing a shirt sleeve over her head after shirt placed in correct orientation in her lap.  On second attempt, she was able to correctly don the shirt with SBA and slow deliberate movement to don it in correct alignment.  Pt now has a urometric de la cruz catheter in and requires assist to thread it through LE clothing due to the size of the catheter bag/plastic container.  She demonstrated increased control of use of the dressing stick for LB dressing and ability to don the pants and brief without the device.  Pt was able to move the LUE and use the L hand with improved placement and accuracy in performing self care.  Standing balance at the bed bar was also improved with OTR providing SBA and CGA with pt standing at the bar.             Patient Discharged from acute Occupational Therapy on 11/29/2018.  Please refer to discharge FIMs dated 11/29/2018 for functional status.        Assessment:      Goals partially met.  Pt met 4/8 LTGs and made improvement of 1-3 levels of performance in the 4 goals not met.    Objective:     GOALS:   Multidisciplinary Problems     Occupational Therapy Goals        Problem: Occupational Therapy Goal    Goal Priority Disciplines Outcome Interventions   Occupational Therapy Goal     OT, PT/OT Ongoing (interventions implemented as appropriate)    Description:  Goals to be met by: 11/29/2018  Patient will increase functional independence with ADLs by performing:    Feeding with Modified [place].  UE Dressing with Set-up Assistance.  LE Dressing with Stand-by Assistance.  Grooming while seated with  Set-up Assistance.   Shower transfer to TTB or shower chair with Beth and wall bar.  Bathing from  shower chair/bench with Minimal Assistance.  Toileting from bedside commode over toilet with Minimal Assistance for hygiene and clothing management.  Toilet transfer to bedside commode over toilet with Contact Guard Assistance.   Sitting at edge of bed x15 minutes with Stand-by Assistance while participating in ADL or exercise.                     Reasons for Discontinuation of Therapy Services  Transfer to alternate level of care. and Satisfactory goal achievement.      Plan:     Patient Discharged to: Home with Outpatient OT treatment Service.    Caterina Navas OT  11/29/2018

## 2018-11-29 NOTE — PLAN OF CARE
Problem: Physical Therapy Goal  Goal: Physical Therapy Goal  Goals to be met by: 2018     Patient will increase functional independence with mobility by performin. Supine to sit with Minimum Assistance  2. Sit to supine with Minimum Assistance  3. Rolling to Left and Right with Minimum Assistance  4. Sit to stand transfer with Minimal Assistance  5. Bed to chair transfer with Minimal Assistance using Rolling Walker  6. Gait  x 150 feet with Minimal Assistance using Rolling Walker.   7. Wheelchair propulsion x 200 feet with Stand-by Assistance using right upper and lower extremities.  8. Ascend/descend 4 steps with bilateral Handrails Minimal Assistance.   Outcome: Unable to achieve outcome(s) by discharge    PT for discharge assessment.

## 2018-11-29 NOTE — PLAN OF CARE
Problem: Patient Care Overview  Goal: Plan of Care Review  Awake alert, de la cruz placed today due to urine retention. Patient to be discharged tomorrow with a urologist consult in 2 weeks.

## 2018-11-29 NOTE — PLAN OF CARE
Problem: Patient Care Overview  Goal: Plan of Care Review  Outcome: Ongoing (interventions implemented as appropriate)  Patient has an indwelling de la cruz due to urinary retention.  She will be discharged home with the de la cruz & will follow up with a urologist in 2 weeks.

## 2018-11-29 NOTE — PT/OT/SLP DISCHARGE
Physical Therapy Discharge Summary    Name: Dennis Patricio  MRN: [id number]   Principal Problem: New onset right occipital parietal acute ischemic infarct with left hemiparesis.     Patient Discharged from acute Physical Therapy on 2018.  Please refer to prior PT noted date on 2018 for functional status.     Assessment:     Goals partially met. Patient appropriate for care in another setting.    Objective:     Total PT Time:  30 min  Gait Training 10, Wheelchair 5, Therapeutic Activity 15    Pt found sitting up in w/c in room.  Wheelchair training on level tile x 175 feet with Min A for steering.  Sit <> stand from w/c with Min A and vc.  Gait training on level tile with Hemiwalker x 95 feet with Mod > Min A for balance and vc for directions due to visual impairment.  SciFit stepper x 10 min.  Pt was provided with written HEP and cloth gait belt for home use.  Pt left sitting up in w/c in room with call button in reach, daughter present and nurse notified.     GOALS:   Multidisciplinary Problems     Physical Therapy Goals        Problem: Physical Therapy Goal    Goal Priority Disciplines Outcome Goal Variances Interventions   Physical Therapy Goal     PT, PT/OT Unable to achieve outcome(s) by discharge     Description:  Goals to be met by: 2018     Patient will increase functional independence with mobility by performin. Supine to sit with Minimum Assistance  2. Sit to supine with Minimum Assistance  3. Rolling to Left and Right with Minimum Assistance  4. Sit to stand transfer with Minimal Assistance  5. Bed to chair transfer with Minimal Assistance using Rolling Walker  6. Gait  x 150 feet with Minimal Assistance using Rolling Walker.   7. Wheelchair propulsion x 200 feet with Stand-by Assistance using right upper and lower extremities.  8. Ascend/descend 4 steps with bilateral Handrails Minimal Assistance.                    Reasons for Discontinuation of Therapy Services  Transfer to  alternate level of care. and Satisfactory goal achievement.      Plan:     Patient Discharged to: Outpatient Therapy Services.    I certify that I was present in the room directing the student JORDI Peacock in service delivery and guiding them using my skilled judgment.       Raquel Almaguer, PT  11/29/2018

## 2018-12-03 ENCOUNTER — TELEPHONE (OUTPATIENT)
Dept: UROLOGY | Facility: CLINIC | Age: 68
End: 2018-12-03

## 2018-12-03 ENCOUNTER — CLINICAL SUPPORT (OUTPATIENT)
Dept: REHABILITATION | Facility: HOSPITAL | Age: 68
End: 2018-12-03
Attending: PHYSICAL MEDICINE & REHABILITATION
Payer: MEDICARE

## 2018-12-03 DIAGNOSIS — R68.89 IMPAIRED FUNCTION OF UPPER EXTREMITY: ICD-10-CM

## 2018-12-03 DIAGNOSIS — I69.319 COGNITIVE DEFICIT AS LATE EFFECT OF CEREBROVASCULAR ACCIDENT (CVA): Primary | ICD-10-CM

## 2018-12-03 DIAGNOSIS — I69.398 VISION DISTURBANCE FOLLOWING CVA (CEREBROVASCULAR ACCIDENT): ICD-10-CM

## 2018-12-03 DIAGNOSIS — Z74.09 IMPAIRED MOBILITY AND ADLS: ICD-10-CM

## 2018-12-03 DIAGNOSIS — Z78.9 IMPAIRED INSTRUMENTAL ACTIVITIES OF DAILY LIVING (IADL): ICD-10-CM

## 2018-12-03 DIAGNOSIS — R27.9 LACK OF COORDINATION AS LATE EFFECT OF CEREBROVASCULAR ACCIDENT (CVA): ICD-10-CM

## 2018-12-03 DIAGNOSIS — I69.393 ATAXIA DUE TO RECENT CEREBROVASCULAR ACCIDENT: ICD-10-CM

## 2018-12-03 DIAGNOSIS — I69.398 LACK OF COORDINATION AS LATE EFFECT OF CEREBROVASCULAR ACCIDENT (CVA): ICD-10-CM

## 2018-12-03 DIAGNOSIS — I63.9 CEREBROVASCULAR ACCIDENT (CVA), UNSPECIFIED MECHANISM: ICD-10-CM

## 2018-12-03 DIAGNOSIS — H53.9 VISION DISTURBANCE FOLLOWING CVA (CEREBROVASCULAR ACCIDENT): ICD-10-CM

## 2018-12-03 DIAGNOSIS — Z78.9 IMPAIRED MOBILITY AND ADLS: ICD-10-CM

## 2018-12-03 PROCEDURE — G8979 MOBILITY GOAL STATUS: HCPCS | Mod: CJ,PN

## 2018-12-03 PROCEDURE — 92523 SPEECH SOUND LANG COMPREHEN: CPT | Mod: PN

## 2018-12-03 PROCEDURE — G9169 MEMORY GOAL STATUS: HCPCS | Mod: CI,PN

## 2018-12-03 PROCEDURE — G9168 MEMORY CURRENT STATUS: HCPCS | Mod: CJ,PN

## 2018-12-03 PROCEDURE — 97161 PT EVAL LOW COMPLEX 20 MIN: CPT | Mod: PN

## 2018-12-03 PROCEDURE — 97530 THERAPEUTIC ACTIVITIES: CPT | Mod: PN

## 2018-12-03 PROCEDURE — G8978 MOBILITY CURRENT STATUS: HCPCS | Mod: CM,PN

## 2018-12-03 PROCEDURE — G8985 CARRY GOAL STATUS: HCPCS | Mod: CK,PN

## 2018-12-03 PROCEDURE — G8987 SELF CARE CURRENT STATUS: HCPCS | Mod: CL,PN

## 2018-12-03 PROCEDURE — 97166 OT EVAL MOD COMPLEX 45 MIN: CPT | Mod: PN

## 2018-12-03 NOTE — PROGRESS NOTES
TIME RECORD    Date: 12/03/2018    Start Time:  1409   Stop Time:  1440     PROCEDURES:        UNTIMED  Procedure Time Min.   Cognitive-Linguistic Eval Start:1409  Stop: 1440 31    Start:  Stop:      Total Untimed Units:  1  Charges Billed/# of units:  1    SPEECH/LANGUAGE PATHOLOGY EVALUATION    Order: Speech therapy eval   Order date: 11/30/18    Diagnosis/Past Medical History:   Past Medical History:   Diagnosis Date    Arthritis     Back pain, chronic     Diabetes mellitus     New in 2018    Hypertension      Social/Education History:  11th grade education, lives with family  Precautions:  Fall, vision impaired  Vision:  Left neglect, blurry vision  Hearing:  adequate  Pain at time of assessment: denies pain at this time.      Receptive Language: intact  Yes/No Response: intact  Follows Direction: 3-Step - intact  Identifies pictures/objects:  Intact  Auditory Comprehension of paragraph-length material:  intact  Reading Comprehension: unable to read even large type due to vision changes associated with CVA    Expressive Language: mild deficit  Naming:  Intact   Repetition:  Mild deficit with increased complexity   Automatic:  intact  Exhibits:  word-finding difficulty - mild Spontaneous Speech:  Mild deficits due to word finding  Written Language: Signs Name:  intact  Writes Words:  Intact  Writes Sentences:  Intact  Legibility:  excellent    Speech: Intelligibility: Intelligible  Voice Quality:  WFL  Pitch:  WFL  Intensity:  WFL    Oral Mechanism:  Dentition:  Scattered, no denture present   Function:  WNL ROM:  WNL Apraxia:  no    Cognition:   Orientation x4  Memory: Short-Term: mild deficit with increased complexity Delayed/Short Term: mild-moderate deficit   Long-Term: intact  Decision Making: moderate problems solving deficit with calculation/money/time; intact judgement & safety problem solving  Organization/Sequencing: intact   [place] Cognitive Assessment (MoCA) score (adjusted for education  level) - 21 out of 28 indicating mild cognitive-linguistic deficits    Impressions:  New vision deficit affecting reading & visual tasks; mild-moderate immediate & delayed memory deficits; moderate-severe calculation/time/money deficits (possibly baseline)    ST. Complete vision assessment    2. Use memory strategies to recall complex/multi-step functional info presented verbally, immediately & after 5 min filled delay, 90% acc.   3. Complete functional time/money calculation tasks, 90% acc indep   To be completed by:  18  LTG:  Use memory strategies, including external visual supports, indep. to recall functional information   Perform functional time/money calculations for daily use.   To be completed by:  19    Patient/Family Goals:  Do more for myself    Rehab Potential: good    Treatment initiated as follows:    Plan: Frequency: 2x per week for 45 minutes  Duration:  4 weeks  Treatment Plan:  tx sessions & home exercise program    Therapist's Name: Odessa Jarrett   Date: 2018    I CERTIFY THE NEED FOR THESE SERVICES FURNISHED UNDER THIS PLAN OF TREATMENT AND WHILE UNDER MY CARE    Physician's comments: ________________________________________________________________________________________________________________________________________________      Physician's Name: ___________________________________

## 2018-12-03 NOTE — PROGRESS NOTES
Occupational Therapy   Evaluation    Dennis Patricio   MRN: [id number]     OT eval complete. Please see attached POC for details. Thank you for consult!    G-Codes: self-care ([facility]=13)  Current: 60%-80% (CL)  Goal: 40%-60% (CK)    JOSE ALBERTO Brown LOTR  12/3/2018

## 2018-12-03 NOTE — PLAN OF CARE
TIME RECORD    Date: 12/03/2018    Start Time:  1500  Stop Time:  1550    PROCEDURES:    TIMED  Procedure Time Min.   Thera Act Start:1530  Stop:1550 20         UNTIMED  Procedure Time Min.   PT Eval Start:1500  Stop:1530 30     Total Timed Minutes:  20  Total Timed Units:  1  Total Untimed Units:  1  Charges Billed/# of units:  2    OUTPATIENT PHYSICAL THERAPY   PATIENT EVALUATION  Onset Date: 11/7/18  Primary Diagnosis: CVA with left hemipares  Treatment Diagnosis: Gait disturbance  Past Medical History:   Diagnosis Date    Arthritis     Back pain, chronic     Diabetes mellitus     New in 2018    Hypertension      Precautions: falls, + de la cruz catheter  Prior Therapy: Inpatient  Medications: Dennis Patricio has a current medication list which includes the following prescription(s): acetaminophen, amlodipine, aspirin, atorvastatin, clopidogrel, hydrochlorothiazide, lisinopril, metformin, and tamsulosin.  Nutrition:  Overweight  History of Present Illness: Patient had confusion, blurred vision, left sided weakness and unsteady gait a month ago.   Prior Level of Function: Independent  Social History: retired   Place of Residence (Steps/Adaptations): 2 step entry. Lives with family.  Functional Deficits Leading to Referral/Nature of Injury: Patient needs assistance with dressing and ambulation. Difficulty manipulating wheelchair.  Patient Therapy Goals: To walk and function better.    Subjective     Dennis Patricio states that she requires a lot of active supervision and ready assist at home to avoid falling when she is getting up or walking especially in maneuvering steps. Reports poor use of the left UE and unsteady balance..    Pain: None    Objective     Posture: Lists to right.   Sensation: decreased proprioception, mild  Left side neglect  Range of Motion/Strength: ROM all 4's WFL          Motors: RUE/LE  4/5                        LUE 3/5                       LLE  3+/5 gross    Flexibility: rigid  extension tone  Gait: With AD.  Device Used -  Hemiwalker  Analysis: Assymetric swing pattern with right side list R swing > Left  Bed Mobility:Assistance - mod I  Transfers: Assistance - supervision  Special Tests: Romberg (-)  Other: Anderson [id number]  Treatment: Pre-gait, balance and coordination activities using // bars and hemiwalker.    Assessment       Initial Assessment (Pertinent finding, problem list and factors affecting outcome): Left sided hemiparesis with ataxic gross coordination and unsteady balance. Gait skills with high risk for falls secondary to poor midline orientation and slow reactive balance strategy.  Will benefit from therapy to address those deficits.  Rehab Potiential: good    Short Term Goals (2 Weeks):   1. Patient will demonstrate good balance strategy during transfers  2. Patient will ambulate with Supervision on HW > 150 ft  3. Patient will control  both feet placement on pedals using reciprocating movements on bike.  Long Term Goals (6 Weeks):   1. Patient will ambulate with cane mod I. on level ground.  2. Patient will have 41/56 on Anderson balance scale  3. Patient will demonstrate good standing balance unsupported.      Plan     Certification Period: 12/3/18 to 1/15/19  Recommended Treatment Plan: 2 times per week for 6 weeks: Gait Training, Manual Therapy, Neuromuscular Re-ed, Patient Education, Therapeutic Activites and Therapeutic Exercise        Therapist: John Ceron, PT    I CERTIFY THE NEED FOR THESE SERVICES FURNISHED UNDER THIS PLAN OF TREATMENT AND WHILE UNDER MY CARE    Physician's comments: ________________________________________________________________________________________________________________________________________________      Physician's Name: ___________________________________

## 2018-12-03 NOTE — TELEPHONE ENCOUNTER
----- Message from Josh Hunt sent at 12/3/2018  2:29 PM CST -----  Type:  Sooner Apoointment Request    Caller is requesting a sooner appointment.  Caller declined first available appointment listed below.  Caller will not accept being placed on the waitlist and is requesting a message be sent to doctor.    Name of Caller:patients daughter joey  When is the first available appointment? 01/22/2019  Symptoms: catheter problems   Best Call Back Number:  [phone]  Additional Information:  Patient needs to be as soon as possible and needs to schedule a new patient appointment with office. Please call joey to schedule.

## 2018-12-06 PROBLEM — Z78.9 IMPAIRED INSTRUMENTAL ACTIVITIES OF DAILY LIVING (IADL): Status: ACTIVE | Noted: 2018-12-03

## 2018-12-06 PROBLEM — I69.398 LACK OF COORDINATION AS LATE EFFECT OF CEREBROVASCULAR ACCIDENT (CVA): Status: ACTIVE | Noted: 2018-12-03

## 2018-12-06 PROBLEM — I69.393 ATAXIA DUE TO RECENT CEREBROVASCULAR ACCIDENT: Status: ACTIVE | Noted: 2018-12-03

## 2018-12-06 PROBLEM — R68.89 IMPAIRED FUNCTION OF UPPER EXTREMITY: Status: ACTIVE | Noted: 2018-12-03

## 2018-12-06 PROBLEM — H53.9 VISION DISTURBANCE FOLLOWING CVA (CEREBROVASCULAR ACCIDENT): Status: ACTIVE | Noted: 2018-12-03

## 2018-12-06 PROBLEM — R27.9 LACK OF COORDINATION AS LATE EFFECT OF CEREBROVASCULAR ACCIDENT (CVA): Status: ACTIVE | Noted: 2018-12-03

## 2018-12-06 PROBLEM — I69.398 VISION DISTURBANCE FOLLOWING CVA (CEREBROVASCULAR ACCIDENT): Status: ACTIVE | Noted: 2018-12-03

## 2018-12-06 PROBLEM — Z74.09 IMPAIRED MOBILITY AND ADLS: Status: ACTIVE | Noted: 2018-12-03

## 2018-12-06 PROBLEM — Z78.9 IMPAIRED MOBILITY AND ADLS: Status: ACTIVE | Noted: 2018-12-03

## 2018-12-06 NOTE — PLAN OF CARE
Occupational Therapy   Evaluation    Dennis Patricio   MRN: [id number]   Referring Physician: Dominique Brink MD     Date: 12/03/2018  Start Time:  1305  Stop Time:  1400        UNTIMED  Procedure Time Min.   Eval (moderate complexity) Start:  Stop: 55   Total Timed Minutes:  0  Total Timed Units:  0  Total Untimed Units:  1  Charges Billed/# of units:  1    Past Medical History:   Diagnosis Date    Arthritis     Back pain, chronic     Diabetes mellitus     New in 2018    Hypertension      No past surgical history on file.  Review of patient's allergies indicates:  No Known Allergies    Current Outpatient Medications:     acetaminophen (TYLENOL) 325 MG tablet, Take 2 tablets (650 mg total) by mouth every 6 (six) hours as needed., Disp: , Rfl: 0    amLODIPine (NORVASC) 10 MG tablet, Take 1 tablet (10 mg total) by mouth once daily., Disp: 30 tablet, Rfl: 0    aspirin 81 MG Chew, Take 1 tablet (81 mg total) by mouth once daily., Disp: , Rfl: 0    atorvastatin (LIPITOR) 80 MG tablet, Take 1 tablet (80 mg total) by mouth every evening., Disp: 30 tablet, Rfl: 0    clopidogrel (PLAVIX) 75 mg tablet, Take 1 tablet (75 mg total) by mouth once daily., Disp: 30 tablet, Rfl: 0    hydroCHLOROthiazide (MICROZIDE) 12.5 mg capsule, Take 1 capsule (12.5 mg total) by mouth once daily., Disp: 30 capsule, Rfl: 0    lisinopril (PRINIVIL,ZESTRIL) 20 MG tablet, Take 1 tablet (20 mg total) by mouth every evening., Disp: 30 tablet, Rfl: 0    metFORMIN (GLUCOPHAGE-XR) 500 MG 24 hr tablet, Take 2 tablets (1,000 mg total) by mouth before dinner., Disp: 30 tablet, Rfl: 0    tamsulosin (FLOMAX) 0.4 mg Cap, Take 1 capsule (0.4 mg total) by mouth once daily., Disp: 30 capsule, Rfl: 0    Signs of Abuse: none noted    Nutrition: grossly WFL    Diagnosis:  R occipital parietal acute ischemic infarct  Encounter Diagnoses   Name Primary?    Impaired mobility and ADLs     Impaired instrumental activities of daily living (IADL)      "Lack of coordination as late effect of cerebrovascular accident (CVA)     Impaired function of upper extremity     Vision disturbance following CVA (cerebrovascular accident)     Ataxia due to recent cerebrovascular accident         Onset date: November 2018  Orders: Eval and Treat    Precautions: universal, fall  MRI: No recent imaging available.   Dominant hand: right    Subjective:     Chief Complaint: wants to "get the left arm working better, get control of it; be able to walk more without being weak-like"    History of Current Situation, including Past Treatment: Pt went to ED, was dx'd w/ HTN and sent home. Two days later, she was admitted through ED after 2 days of L weakness, unsteady gait and confusion. Pt was in IPR at his facility 11/9-11/29.    Social Hx: Lives with son and grandson in a mobile home, 3 CARLA.    Prior Level of Function:  Dennis was indep w/ cooking, cleaning, and ADLs. She doesn't drive. Hobbies included reading and singing at [employer].    Current Level of Function: Using w/c, max A for dressing. She is not cooking.    DME: Manual wheelchair, Hemiwalker, BSC and de la cruz catheter    Pain: 0 /10 in eval today    Patient goals: improve L UE function    Objective:     Cognition/Perception: Please see ST eval for full details.  Hearing:  intact  Communication:  Min decreased  Oriented: Person, Place, Time and Situation  Attention:  Min decreased, internally distracted  Follows Commands: Follows one-step commands  Memory:  Dennis reports no significant change in memory since CVA, but appears to have STM deficits   Problem Solving:  Dennis reports some deficits in problem-solving  Neglect:  Decreased L attention  Safety/Judgment/Insight:  impaired  Coping skills/emotional control: Appropriate to situation    Visual/perceptual: Everthing was very blurry in the beginning; still blurry now but better. She doesn't report double vision. She is able to read the clock across the room. She does have some " decreased L attention. Vision to be further assessed as appropriate.    Physical:  Posture: Rounded shoulder, Head forward and Slouched posture  Joint integrity/ Subluxation: no subluxation noted  Skin integrity: Visible skin intact  Edema: no obvious edema    Sensation: Dennis reports intermittent numbness and tingling.  Light touch: inconsistent for the L UE   Proprioception: grossly intact - scored a 2 on the Thumb localization test     UE Movement and Coordination:  Hand Domination:  right  Affected Extremity:  left    Tone:  Appears normal at rest in clinic; she reports no time when hand is involuntarily in a fist    GMC: L UE w/ ataxia  Box & Block Test: R=34; L significantly impaired, difficulty with picking up blocks  [norm for women aged 65-69: R=42.0+/-6.2l K=71.3+/-7.7]    FMC: R UE min impaired based on observation; L UE max impaired    FTHUE: to be assessed    Range of Motion/ Strength:  L scapula is minimally depressed and laterally rotated compared to the right, but grossly the same distance from the spine.  R UE: AROM is WFL; strength is grossly 4/5 except biceps 5/5; PROM ER@0*abd=45, ER@45*abd=47, ER@90*abd=62; SLIR=40  L UE: AROM sh flex=136; mod decr ER; WFL IR (to bra strap); distally WFL; PROM ER@0*abd=44, ER@45*abd=61, ER@90*abd=61; SLIR=42    Pelvic ROM: min decreased, requires max verbal and tactile cues to move through entire ROM.    Hand Strength: R UE WFL; L UE min decreased   (lbs) Right Left    1 47   31     2 53   24     3 48  25    AVG 49.33  26.67     [norm for women aged 65-69: R=49.6 +/-9.7; L=41.0+/-8.2]    Pinch Right Left   Lateral 12.5   11 (with assist to position)   Tip (2 point) Unable to position Unable to position   Tip (3 point) Unable to position Unable to position     Balance:  Static Sitting:  good   Static Standing:  fair   Dynamic Sitting:  fair   Dynamic Standing:  poor                    Functional Mobility:  When pt arrived for therapy, daughter requested  "assist with t/f from car to w/c. Mod A car transfer for safety.  Min-mod A SPT w/c<>EOM.    ADL's:  AM-PAC 6 CLICK ADL  How much help from another person does this patient currently need?  1 = Unable, Total/Dependent Assistance  2 = A lot, Maximum/Moderate Assistance  3 = A little, Minimum/Contact Guard/Supervision  4 = None, Modified [place]/Independent    Eating: 3       Grooming: 3      UB Dressin (min A with significantly increased time for long-sleeve shirt, max A for jacket)  LB Dressin      Bathin      Toiletin  Total: 13    AM-PAC Raw Score CMS "G-Code Modifier Level of Impairment Assistance   6 % Total / Unable   7 - 9 CM 80 - 100% Maximal Assist   10-14 CL 60 - 80% Moderate Assist   15 - 19 CK 40 - 60% Moderate Assist   20 - 22 CJ 20 - 40% Minimal Assist   23 CI 1-20% SBA / CGA   24 CH 0% Independent/ Mod I     IADL's:  Pt is is not currently participating in IADLs. Family is doing cooking, cleaning, med management.  OT did encourage to engage in cooking tasks with her grandson, even if her participation is mostly verbal. She v/u.     Assessment:       Dennis Patricio presents with a moderate complexity OT evaluation. She required expanded review of medical history and occupational profile. Pt w/ deficits as noted below limit her ability to engage at her prior level of function. Clinical decision making required medium analytical complexity due to occupational profile factors, data from detailed assessments, several treatment options and comorbidities affecting occupational performance, including (but not limited to) chronic back pain, HTN, DM, & arthritis. Min modifications were required to complete the assessment. It is believed that with skilled occupational therapy and a progressive home program that Dennis can improve her function to allow for increased participation in her valued roles of mother, grandmother, waters and independent adult.     PROBLEM LIST/IMPAIRMENTS: "   impaired endurance, impaired sensation, impaired self care skills, impaired functional mobility, gait instability, impaired balance, visual deficits, impaired cognition, decreased coordination, decreased upper extremity function and decreased safety awareness    Patient Education/Response:     1. Dennis performed and was provided with a written copy of exercises to perform including anterior and posterior pelvic tilt exercises. Exercises were reviewed and Dennis was able to demonstrate them prior to the end of the session.     Plans and Goals:     LTG GOALS:  Time frame: 12 weeks (12/3/2018-2/27/2019)  1) Pt will be supervision with dressing, including mgt of catheter in/out of pants if she still has it.  2) Pt will be supervision with bathing.  3) supervision with toileting and t/f.  4) Dennis will demo improved GMC as evidenced by a score of at least 20 on the box and block assessment.  5) Dennis will demo improved L UE function, as evidenced by a score of 1 higher on the FTHUE compared to initial testing.  6) Dennis will be able to complete the 9HPT in less than 2 min due to improved FMC.  7) Dennis will be able to safely engage in moderate meal prep, with min assist from another and modified tools and techniques as necessary.    STG Goals:  Time frame: 4 weeks (12/3/2018-1/2/2019)  1) UB dressing will improve to CGA for t-shirt, min A for jacket  2) LB dressing will improve to Mod A  3) Toilet transfers will improve to CGA, min A for managing pants.     Patient/caregiver understands and agrees with plan of care.    Certification Period: 12 weeks (12/3/2018-2/27/2018)  Outpatient occupational therapy 2  times weekly for 12 weeks  to include: Therapeutic Exercise, Functional Activities, Patient Education, Home Exercise Program, ADL Training, Transfer/Mobility Training, Moist Heat/Ice/Paraffin, Sensory/Neuromuscular Reeducation, Functional Standing, Cognitive Preceptual Retraining and Manual Therapy, as well as  any other treatment approaches deemed necessary to assist Dennis in meeting her goals.    JOSE ALBERTO Brown, LOTR  12/3/2018      I certify the need for these services furnished under this plan of treatment and while under my care.  ____________________________________ Physician/Referring     ____________________   Practitioner Date of Signature

## 2018-12-10 ENCOUNTER — TELEPHONE (OUTPATIENT)
Dept: REHABILITATION | Facility: HOSPITAL | Age: 68
End: 2018-12-10

## 2018-12-12 ENCOUNTER — TELEPHONE (OUTPATIENT)
Dept: REHABILITATION | Facility: HOSPITAL | Age: 68
End: 2018-12-12

## 2018-12-12 ENCOUNTER — OFFICE VISIT (OUTPATIENT)
Dept: PHYSICAL MEDICINE AND REHAB | Facility: CLINIC | Age: 68
End: 2018-12-12
Payer: MEDICARE

## 2018-12-12 VITALS
HEART RATE: 88 BPM | SYSTOLIC BLOOD PRESSURE: 121 MMHG | BODY MASS INDEX: 26.22 KG/M2 | HEIGHT: 68 IN | DIASTOLIC BLOOD PRESSURE: 73 MMHG | WEIGHT: 173 LBS

## 2018-12-12 DIAGNOSIS — I63.9 CEREBROVASCULAR ACCIDENT (CVA), UNSPECIFIED MECHANISM: Primary | ICD-10-CM

## 2018-12-12 PROCEDURE — 99213 OFFICE O/P EST LOW 20 MIN: CPT | Mod: PBBFAC,PN | Performed by: PHYSICAL MEDICINE & REHABILITATION

## 2018-12-12 PROCEDURE — 99204 OFFICE O/P NEW MOD 45 MIN: CPT | Mod: S$PBB,,, | Performed by: PHYSICAL MEDICINE & REHABILITATION

## 2018-12-12 PROCEDURE — 99999 PR PBB SHADOW E&M-EST. PATIENT-LVL III: CPT | Mod: PBBFAC,,, | Performed by: PHYSICAL MEDICINE & REHABILITATION

## 2018-12-12 NOTE — PROGRESS NOTES
HPI:  Patient is a 68 y.o. year old female s/p cva w. Left hemiparesis. She is s/p IP rehab. She did very well -see hospital course below. She is currently walking superivision w. hemiwalker >150ft.   She discontinued speech for congnitive treatment on her own. She states she did not have her glasses at the time of the evaluation and that is why she scored low. As per her sister who is her caregiver, she has noticed only mild deficit from previous function. She has been going to outpatient physical therapy and is progressing well.    IP rehab hospital   Hospital Course:  (obtained from chart) Patient's course on the rehabilitation unit has been with steady progress.  Patient's left sided weakness much improved.  Left neglect and visual field deficits have been improving.  Patient's blood pressure and blood sugars were elevated and had to adjust the medications initially and where well control by the time of discharge.  Patient presented with urinary retention inserted Watkins initially tried voiding trial with not much success, decided to go ahead and leave the Watkins catheter in on discharge.  Patient also had a urinary tract infection was treated for the same.  Therapy wise patient was needing standby assistance with bed mobility, memory, minimal assistance with transfers, ambulating 100 ft with a shona walker, grooming, upper extremity dressing, bathing, toileting, problem solving. Patient was needing contact guard assistance with wheelchair mobility about 200 ft, setup with feeding, moderate assistance with lower extremity dressing, toilet transfers, tub transfers, and modified independent with receptive and expressive language.  Patient's family participated in the training.  Patient is discharged home today with  outpatient PT, OT, speech therapy.  Patient to have a follow-up appointment with PCP and neurologist as recommended.  Follow-up with Dr. Layne      Imaging  Patient had a CT scan consistent with right  posterior acute ischemic infarct   MRI consistent with  right occipital parietal ischemic infarct    Labs  11/26/2018  Gluc 163  egfr cr nl    11/12 (in hospital s/p stroke)  egfr 21  Cr 2.6  Gluc 159  lfts nl    Past Medical History:   Diagnosis Date    Arthritis     Back pain, chronic     Diabetes mellitus     New in 2018    Hypertension      No past surgical history on file.  No family history on file.  Social History     Socioeconomic History    Marital status: Single     Spouse name: Not on file    Number of children: Not on file    Years of education: Not on file    Highest education level: Not on file   Social Needs    Financial resource strain: Not on file    Food insecurity - worry: Not on file    Food insecurity - inability: Not on file    Transportation needs - medical: Not on file    Transportation needs - non-medical: Not on file   Occupational History    Not on file   Tobacco Use    Smoking status: Former Smoker     Packs/day: 0.00   Substance and Sexual Activity    Alcohol use: No    Drug use: No    Sexual activity: No   Other Topics Concern    Not on file   Social History Narrative    Not on file       Review of patient's allergies indicates:  No Known Allergies    Current Outpatient Medications:     acetaminophen (TYLENOL) 325 MG tablet, Take 2 tablets (650 mg total) by mouth every 6 (six) hours as needed., Disp: , Rfl: 0    amLODIPine (NORVASC) 10 MG tablet, Take 1 tablet (10 mg total) by mouth once daily., Disp: 30 tablet, Rfl: 0    aspirin 81 MG Chew, Take 1 tablet (81 mg total) by mouth once daily., Disp: , Rfl: 0    atorvastatin (LIPITOR) 80 MG tablet, Take 1 tablet (80 mg total) by mouth every evening., Disp: 30 tablet, Rfl: 0    clopidogrel (PLAVIX) 75 mg tablet, Take 1 tablet (75 mg total) by mouth once daily., Disp: 30 tablet, Rfl: 0    hydroCHLOROthiazide (MICROZIDE) 12.5 mg capsule, Take 1 capsule (12.5 mg total) by mouth once daily., Disp: 30 capsule, Rfl: 0     lisinopril (PRINIVIL,ZESTRIL) 20 MG tablet, Take 1 tablet (20 mg total) by mouth every evening., Disp: 30 tablet, Rfl: 0    metFORMIN (GLUCOPHAGE-XR) 500 MG 24 hr tablet, Take 2 tablets (1,000 mg total) by mouth before dinner., Disp: 30 tablet, Rfl: 0    tamsulosin (FLOMAX) 0.4 mg Cap, Take 1 capsule (0.4 mg total) by mouth once daily., Disp: 30 capsule, Rfl: 0      Review of Systems  No nausea, vomiting, fevers, Chills , contipation, diarrhea or sweats      Physical Exam:      Vitals:    12/12/18 1049   BP: 121/73   Pulse: 88   alert and oriented ×4 follows commands answers all questions appropriately,affect wnl  Manual muscle test 5 out of 5 rue and rle, 4/5 lue and lle sensation to light touch grossly intact  Difficulty doing finger to nose on the left  No neglect noted  No dysarthria   No facial droop  Sitting in wheelchair w. Urinary de la cruz catheter  Nl gait  babinsky down  + clonus sustained left  DTR's symmetric 2+  No C/C/E  No focal muscular atrophy    Assessment:  CVA w. Residual mild left hemiparesis, poor coordination, mild cognitive deficit-recovering well    Plan:  Continue outpt physical and occupational therapy  Follow up w. Urology- needs to get de la cruz catheter out  She is recovering rapidly, will probably be back to baseline in 6-8wks. She needs to continue outpt therapy  She is declining returning to speech therapy

## 2018-12-12 NOTE — TELEPHONE ENCOUNTER
OT called pt's phone re: no show for 11am OT appointment. Pt's sister answered. Sister reports they are still at the doctor's office, Ms. Patricio is fatigued after MD appointment, and she needs to check with the daughter to find out if Ms. Patricio will be coming to her therapy appointments today.    OT notified sister that pt has appointments at 11, 12 & 1 today for therapy, as well as at 10 & 11 next Monday.    The sister confirmed for next Monday, did not say whether they would be here today and did not take down number so that daughter could return call about today's appointments.

## 2018-12-18 ENCOUNTER — OFFICE VISIT (OUTPATIENT)
Dept: UROLOGY | Facility: CLINIC | Age: 68
End: 2018-12-18
Payer: MEDICARE

## 2018-12-18 VITALS
HEIGHT: 68 IN | SYSTOLIC BLOOD PRESSURE: 113 MMHG | BODY MASS INDEX: 26.23 KG/M2 | WEIGHT: 173.06 LBS | DIASTOLIC BLOOD PRESSURE: 70 MMHG | HEART RATE: 86 BPM

## 2018-12-18 DIAGNOSIS — I63.9 CEREBROVASCULAR ACCIDENT (CVA), UNSPECIFIED MECHANISM: ICD-10-CM

## 2018-12-18 DIAGNOSIS — R33.9 URINARY RETENTION: Primary | ICD-10-CM

## 2018-12-18 DIAGNOSIS — E11.9 DIABETES MELLITUS, NEW ONSET: ICD-10-CM

## 2018-12-18 PROCEDURE — 99999 PR PBB SHADOW E&M-EST. PATIENT-LVL III: CPT | Mod: PBBFAC,,, | Performed by: NURSE PRACTITIONER

## 2018-12-18 PROCEDURE — 99204 OFFICE O/P NEW MOD 45 MIN: CPT | Mod: S$PBB,,, | Performed by: NURSE PRACTITIONER

## 2018-12-18 PROCEDURE — 99213 OFFICE O/P EST LOW 20 MIN: CPT | Mod: PBBFAC,PN | Performed by: NURSE PRACTITIONER

## 2018-12-18 NOTE — PROGRESS NOTES
[facility] Note  Staff: BANG Lara    PCP: [facility] Vasquez     Chief Complaint: Urinary retention s/p de la cruz catheter, recent stroke    Subjective:        HPI: Dennis Patricio is a 68 y.o. female NEW PATIENT presents today for hospital follow-up related to urinary retention r/t recent CVA.  On 11/09/18, pt presented to [facility] ER with complaints of blurred vision, left-sided weakness, unsteady gait, confusion that began 2 days prior to this hospitalization.  Patient was initially seen in the emergency room 2 days prior, noted to have elevated blood pressures and was discharged home with medications.  Patient was readmitted to hospital this time with  inability to get up and walk to the bathroom, felt she could not move her left leg and walk,patient was also unable to  objects with the left hand and felt heavy and numb.  Patient had a CT scan consistent with right posterior acute ischemic infarct, MRI consistent with  right occipital parietal ischemic infarct, carotids without any significant stenosis, echocardiogram with no thrombus or right to left shunt noted.  Patient was medically stabilized and was transferred down to rehabilitation.     On 11/29/18 pt presented into rehab unit with urinary retention inserted De La Cruz, then rehab initially tried voiding trial with not much success, decided to go ahead and leave the De La Cruz catheter in on discharge.  Patient also had a urinary tract infection was treated for the same.    Pt states today prior to ER visit, she has had no prior problems with urination, no hematuria, no dysuria, and no history of urinary tract infections.    *Pt is accompanied by daughter to office visit today.  Daughter states pt has missed at least 4 PT appointments since hospital  Discharge, and currently does NOT have a primary care provider??  Her medical history includes recent CVA, Hypertension, and new onset Diabetes Mellitus.  Pt was going to [facility], but no longer does.  Needs PCP!    REVIEW OF SYSTEMS:  A comprehensive 10 system review was performed and is negative except as noted above in HPI    PMHx:  Past Medical History:   Diagnosis Date    Arthritis     Back pain, chronic     Diabetes mellitus     New in 2018    Hypertension      Kidney stones: No    PSHx:  History reviewed. No pertinent surgical history.    Fam Hx:   malignancies: Yes - Father had prostate cancer  Sister had colon cancer    Social History     Socioeconomic History    Marital status: Single     Spouse name: None    Number of children: None    Years of education: None    Highest education level: None   Social Needs    Financial resource strain: None    Food insecurity - worry: None    Food insecurity - inability: None    Transportation needs - medical: None    Transportation needs - non-medical: None   Occupational History    None   Tobacco Use    Smoking status: Former Smoker     Packs/day: 0.00   Substance and Sexual Activity    Alcohol use: No    Drug use: No    Sexual activity: No   Other Topics Concern    None   Social History Narrative    None     Allergies:  Patient has no known allergies.    Medications: reviewed   Anticoagulation: Yes - ASA 81 mg one tablet daily; Plavix 75 mg one tablet daily    Objective:     Vitals:    12/18/18 0929   BP: 113/70   Pulse: 86       Physical Exam   Vitals reviewed.  Constitutional: She is oriented to person, place, and time. She appears well-developed and well-nourished.   HENT:   Head: Normocephalic and atraumatic.   Eyes: Conjunctivae and EOM are normal. Pupils are equal, round, and reactive to light.   Neck: Normal range of motion. Neck supple.   Cardiovascular: Normal rate, regular rhythm, normal heart sounds and intact distal pulses.    Pulmonary/Chest: Effort normal and breath sounds normal.   Abdominal: Soft. Bowel sounds are normal.   Musculoskeletal: Normal range of motion.   Neurological: She is alert  and oriented to person, place, and time. She has normal reflexes.   Skin: Skin is warm and dry.     Psychiatric: She has a normal mood and affect. Her behavior is normal. Judgment and thought content normal.     LABS REVIEW:  UA today: Watkins catheter on arrival to clinic  Procedure:  Watkins catheter removed at conclusion of office visit today.  Pt tolerated procedure well.    Cr:   Lab Results   Component Value Date    CREATININE 1.0 11/26/2018     Assessment:       1. Urinary retention    2. Cerebrovascular accident (CVA), unspecified mechanism    3. Diabetes mellitus, new onset          Plan:   Hospital f/up: Urinary retention s/p CVA:    1.  Pt will RTC later this afternoon if unable to urinate on her own.  2.  Ambulatory referral to Family Medicine to establish with MD for HTN, New onset DM control and monitoring.    F/u TBD    [facility]: Pending    Jennifer Yoon, LEANDRAC

## 2018-12-19 ENCOUNTER — TELEPHONE (OUTPATIENT)
Dept: REHABILITATION | Facility: HOSPITAL | Age: 68
End: 2018-12-19

## 2018-12-26 ENCOUNTER — TELEPHONE (OUTPATIENT)
Dept: REHABILITATION | Facility: HOSPITAL | Age: 68
End: 2018-12-26

## 2018-12-26 ENCOUNTER — DOCUMENTATION ONLY (OUTPATIENT)
Dept: REHABILITATION | Facility: HOSPITAL | Age: 68
End: 2018-12-26

## 2018-12-26 DIAGNOSIS — R27.9 LACK OF COORDINATION AS LATE EFFECT OF CEREBROVASCULAR ACCIDENT (CVA): ICD-10-CM

## 2018-12-26 DIAGNOSIS — Z78.9 IMPAIRED MOBILITY AND ADLS: ICD-10-CM

## 2018-12-26 DIAGNOSIS — I69.393 ATAXIA DUE TO RECENT CEREBROVASCULAR ACCIDENT: ICD-10-CM

## 2018-12-26 DIAGNOSIS — Z74.09 IMPAIRED MOBILITY AND ADLS: ICD-10-CM

## 2018-12-26 DIAGNOSIS — H53.9 VISION DISTURBANCE FOLLOWING CVA (CEREBROVASCULAR ACCIDENT): ICD-10-CM

## 2018-12-26 DIAGNOSIS — R68.89 IMPAIRED FUNCTION OF UPPER EXTREMITY: ICD-10-CM

## 2018-12-26 DIAGNOSIS — I69.398 LACK OF COORDINATION AS LATE EFFECT OF CEREBROVASCULAR ACCIDENT (CVA): ICD-10-CM

## 2018-12-26 DIAGNOSIS — I69.398 VISION DISTURBANCE FOLLOWING CVA (CEREBROVASCULAR ACCIDENT): ICD-10-CM

## 2018-12-26 DIAGNOSIS — Z78.9 IMPAIRED INSTRUMENTAL ACTIVITIES OF DAILY LIVING (IADL): ICD-10-CM

## 2018-12-26 NOTE — PT/OT/SLP DISCHARGE
REHAB SERVICES OUTPATIENT DISCHARGE SUMMARY  Occupational Therapy      Name:  Dennis Patricio  Date:  12/26/2018   Date of Evaluation:  12/3/18  Physician:  Dr. Brink  Total # Of Visits:  1  Cancelled:  0  No Shows:  4  Diagnosis:    1. Impaired mobility and ADLs     2. Impaired instrumental activities of daily living (IADL)     3. Lack of coordination as late effect of cerebrovascular accident (CVA)     4. Impaired function of upper extremity     5. Vision disturbance following CVA (cerebrovascular accident)     6. Ataxia due to recent cerebrovascular accident         Physical/Functional Status:  Please see EMR for initial eval. Pt has not been seen since that time.     The patient is to be discharged from our Therapy service for the following reason(s):  Patient has not attended therapy since 12/3/2018.    Degree of Goal Achievement:  Patient has not met goals secondary to:  Lack of participation in therapy.    Patient Education:  Pt encouraged to increase participation in daily activities at home as much as safely possible.    Discharge Plan:  F/U w/ MD as necessary. If pt's lack of attendance is due to transportation problems, she may benefit from  services for therapy.    JOSE ALBERTO Brown LOTR  12/26/2018      Addendum:  OT called pt and left message re: discharge from therapy services. Left call back number if they have questions or concerns. Also, OT discussed recommendation for HH services with Kiesha (d/c planner with  rehab).     JOSE ALBERTO Brown LOTR  12/26/2018

## 2019-01-03 ENCOUNTER — TELEPHONE (OUTPATIENT)
Dept: REHABILITATION | Facility: HOSPITAL | Age: 69
End: 2019-01-03

## 2019-01-07 ENCOUNTER — DOCUMENTATION ONLY (OUTPATIENT)
Dept: REHABILITATION | Facility: HOSPITAL | Age: 69
End: 2019-01-07

## 2019-01-07 NOTE — PROGRESS NOTES
REHAB SERVICES OUTPATIENT DISCHARGE SUMMARY  Physical Therapy      Name:  Dennis Patricio  Date:  1/7/19  Date of Evaluation:  12/03/18  Physician:  Dominique Brink MD  Total # Of Visits:  1    Diagnosis: CVA with left hemiparesis    Physical/Functional Status:  At time of last visit, patient was ambulating with hemiwalker, assymetric susannah length secondary to rigid extensor tone. Unsteady balance and poor use of the left UE. Decreased proprioception, left sided weakness, ataxia and slow reactive balance strategy.  The patient is to be discharged from our Therapy service for the following reason(s):  Patient has not attended therapy since 12/03/18    Patient Education: safety education    Unplanned DC

## 2019-01-16 ENCOUNTER — TELEPHONE (OUTPATIENT)
Dept: FAMILY MEDICINE | Facility: CLINIC | Age: 69
End: 2019-01-16

## 2019-01-16 NOTE — TELEPHONE ENCOUNTER
Spoke with patient's daughter - schedule first available appt with NP, 01/31/2019. Understanding verbalized.

## 2019-01-16 NOTE — TELEPHONE ENCOUNTER
----- Message from Ella Baires sent at 1/16/2019 12:58 PM CST -----  Contact: Nilda with [facility]  Type:  Sooner Apoointment Request    Caller is requesting a sooner appointment.  Caller declined first available appointment listed below.  Caller will not accept being placed on the waitlist and is requesting a message be sent to doctor.    Name of Caller:  Nilda  When is the first available appointment?  7/8  Symptoms:  NP - Establish care & hospital follow-up  Best Call Back Number:  Lux Patricio (Daughter) [phone]     Additional Information:

## 2019-01-23 ENCOUNTER — TELEPHONE (OUTPATIENT)
Dept: ADMINISTRATIVE | Facility: CLINIC | Age: 69
End: 2019-01-23

## 2019-01-31 ENCOUNTER — OFFICE VISIT (OUTPATIENT)
Dept: FAMILY MEDICINE | Facility: CLINIC | Age: 69
End: 2019-01-31
Payer: MEDICARE

## 2019-01-31 ENCOUNTER — TELEPHONE (OUTPATIENT)
Dept: NEUROLOGY | Facility: CLINIC | Age: 69
End: 2019-01-31

## 2019-01-31 ENCOUNTER — LAB VISIT (OUTPATIENT)
Dept: LAB | Facility: HOSPITAL | Age: 69
End: 2019-01-31
Attending: NURSE PRACTITIONER
Payer: MEDICARE

## 2019-01-31 ENCOUNTER — TELEPHONE (OUTPATIENT)
Dept: FAMILY MEDICINE | Facility: CLINIC | Age: 69
End: 2019-01-31

## 2019-01-31 ENCOUNTER — OUTPATIENT CASE MANAGEMENT (OUTPATIENT)
Dept: ADMINISTRATIVE | Facility: OTHER | Age: 69
End: 2019-01-31

## 2019-01-31 VITALS
BODY MASS INDEX: 24.47 KG/M2 | WEIGHT: 160.94 LBS | TEMPERATURE: 98 F | DIASTOLIC BLOOD PRESSURE: 68 MMHG | HEART RATE: 95 BPM | OXYGEN SATURATION: 98 % | SYSTOLIC BLOOD PRESSURE: 130 MMHG

## 2019-01-31 DIAGNOSIS — E11.9 TYPE 2 DIABETES MELLITUS WITHOUT COMPLICATION, WITHOUT LONG-TERM CURRENT USE OF INSULIN: ICD-10-CM

## 2019-01-31 DIAGNOSIS — Z11.59 NEED FOR HEPATITIS C SCREENING TEST: ICD-10-CM

## 2019-01-31 DIAGNOSIS — D53.9 DEFICIENCY ANEMIA: ICD-10-CM

## 2019-01-31 DIAGNOSIS — I15.2 HYPERTENSION ASSOCIATED WITH DIABETES: ICD-10-CM

## 2019-01-31 DIAGNOSIS — E11.59 HYPERTENSION ASSOCIATED WITH DIABETES: ICD-10-CM

## 2019-01-31 DIAGNOSIS — Z12.39 SCREENING FOR BREAST CANCER: ICD-10-CM

## 2019-01-31 DIAGNOSIS — Z86.73 HISTORY OF CVA IN ADULTHOOD: ICD-10-CM

## 2019-01-31 DIAGNOSIS — E11.9 TYPE 2 DIABETES MELLITUS WITHOUT COMPLICATION, WITHOUT LONG-TERM CURRENT USE OF INSULIN: Primary | ICD-10-CM

## 2019-01-31 LAB
BASOPHILS # BLD AUTO: 0.04 K/UL
BASOPHILS NFR BLD: 0.5 %
CHOLEST SERPL-MCNC: 121 MG/DL
CHOLEST/HDLC SERPL: 2.8 {RATIO}
DIFFERENTIAL METHOD: ABNORMAL
EOSINOPHIL # BLD AUTO: 0.2 K/UL
EOSINOPHIL NFR BLD: 1.7 %
ERYTHROCYTE [DISTWIDTH] IN BLOOD BY AUTOMATED COUNT: 14.6 %
ESTIMATED AVG GLUCOSE: 214 MG/DL
FERRITIN SERPL-MCNC: 174 NG/ML
HBA1C MFR BLD HPLC: 9.1 %
HCT VFR BLD AUTO: 35.2 %
HDLC SERPL-MCNC: 43 MG/DL
HDLC SERPL: 35.5 %
HGB BLD-MCNC: 10.6 G/DL
IMM GRANULOCYTES # BLD AUTO: 0.03 K/UL
IMM GRANULOCYTES NFR BLD AUTO: 0.3 %
IRON SERPL-MCNC: 72 UG/DL
LDLC SERPL CALC-MCNC: 66.8 MG/DL
LYMPHOCYTES # BLD AUTO: 2.7 K/UL
LYMPHOCYTES NFR BLD: 31.2 %
MCH RBC QN AUTO: 27.4 PG
MCHC RBC AUTO-ENTMCNC: 30.1 G/DL
MCV RBC AUTO: 91 FL
MONOCYTES # BLD AUTO: 0.7 K/UL
MONOCYTES NFR BLD: 7.8 %
NEUTROPHILS # BLD AUTO: 5.1 K/UL
NEUTROPHILS NFR BLD: 58.5 %
NONHDLC SERPL-MCNC: 78 MG/DL
NRBC BLD-RTO: 0 /100 WBC
PLATELET # BLD AUTO: 427 K/UL
PMV BLD AUTO: 10.1 FL
RBC # BLD AUTO: 3.87 M/UL
SATURATED IRON: 23 %
TOTAL IRON BINDING CAPACITY: 318 UG/DL
TRANSFERRIN SERPL-MCNC: 215 MG/DL
TRIGL SERPL-MCNC: 56 MG/DL
WBC # BLD AUTO: 8.79 K/UL

## 2019-01-31 PROCEDURE — 99215 OFFICE O/P EST HI 40 MIN: CPT | Mod: PBBFAC,PO | Performed by: NURSE PRACTITIONER

## 2019-01-31 PROCEDURE — 85025 COMPLETE CBC W/AUTO DIFF WBC: CPT

## 2019-01-31 PROCEDURE — 99214 OFFICE O/P EST MOD 30 MIN: CPT | Mod: S$PBB,,, | Performed by: NURSE PRACTITIONER

## 2019-01-31 PROCEDURE — 36415 COLL VENOUS BLD VENIPUNCTURE: CPT | Mod: PO

## 2019-01-31 PROCEDURE — 80061 LIPID PANEL: CPT

## 2019-01-31 PROCEDURE — 99999 PR PBB SHADOW E&M-EST. PATIENT-LVL V: CPT | Mod: PBBFAC,,, | Performed by: NURSE PRACTITIONER

## 2019-01-31 PROCEDURE — 86803 HEPATITIS C AB TEST: CPT

## 2019-01-31 PROCEDURE — 99214 PR OFFICE/OUTPT VISIT, EST, LEVL IV, 30-39 MIN: ICD-10-PCS | Mod: S$PBB,,, | Performed by: NURSE PRACTITIONER

## 2019-01-31 PROCEDURE — 83540 ASSAY OF IRON: CPT

## 2019-01-31 PROCEDURE — 82728 ASSAY OF FERRITIN: CPT

## 2019-01-31 PROCEDURE — 83036 HEMOGLOBIN GLYCOSYLATED A1C: CPT

## 2019-01-31 PROCEDURE — 99999 PR PBB SHADOW E&M-EST. PATIENT-LVL V: ICD-10-PCS | Mod: PBBFAC,,, | Performed by: NURSE PRACTITIONER

## 2019-01-31 RX ORDER — INSULIN PUMP SYRINGE, 3 ML
EACH MISCELLANEOUS
Qty: 1 EACH | Refills: 0 | Status: SHIPPED | OUTPATIENT
Start: 2019-01-31 | End: 2020-01-31

## 2019-01-31 RX ORDER — AMLODIPINE BESYLATE 10 MG/1
10 TABLET ORAL DAILY
Qty: 90 TABLET | Refills: 3 | Status: SHIPPED | OUTPATIENT
Start: 2019-01-31 | End: 2021-08-02 | Stop reason: SDUPTHER

## 2019-01-31 RX ORDER — HYDROCHLOROTHIAZIDE 12.5 MG/1
12.5 CAPSULE ORAL DAILY
Qty: 90 CAPSULE | Refills: 3 | Status: SHIPPED | OUTPATIENT
Start: 2019-01-31 | End: 2021-08-02 | Stop reason: SDUPTHER

## 2019-01-31 RX ORDER — LANCETS
EACH MISCELLANEOUS
Qty: 100 EACH | Refills: 0 | Status: SHIPPED | OUTPATIENT
Start: 2019-01-31

## 2019-01-31 RX ORDER — LISINOPRIL 20 MG/1
20 TABLET ORAL NIGHTLY
Qty: 90 TABLET | Refills: 3 | Status: SHIPPED | OUTPATIENT
Start: 2019-01-31 | End: 2021-08-02 | Stop reason: SDUPTHER

## 2019-01-31 RX ORDER — METFORMIN HYDROCHLORIDE 500 MG/1
1000 TABLET, EXTENDED RELEASE ORAL
Qty: 180 TABLET | Refills: 3 | Status: SHIPPED | OUTPATIENT
Start: 2019-01-31 | End: 2019-02-01

## 2019-01-31 RX ORDER — CLOPIDOGREL BISULFATE 75 MG/1
75 TABLET ORAL DAILY
Qty: 90 TABLET | Refills: 3 | Status: SHIPPED | OUTPATIENT
Start: 2019-01-31 | End: 2021-08-02 | Stop reason: SDUPTHER

## 2019-01-31 RX ORDER — ATORVASTATIN CALCIUM 80 MG/1
80 TABLET, FILM COATED ORAL NIGHTLY
Qty: 90 TABLET | Refills: 3 | Status: SHIPPED | OUTPATIENT
Start: 2019-01-31 | End: 2020-01-31

## 2019-01-31 NOTE — PATIENT INSTRUCTIONS
Understanding Type 2 Diabetes  When your body is working normally, the food you eat is digested and used as fuel. This fuel supplies energy to the bodys cells. When you have diabetes, the fuel cant enter the cells. Without treatment, diabetes can cause serious long-term health problems.     Your body breaks down the food you eat into glucose.   How the body gets energy  The digestive system breaks down food, resulting in a sugar called glucose. Some of this glucose is stored in the liver. But most of it enters the bloodstream and travels to the cells to be used as fuel. Glucose needs the help of a hormone called insulin to enter the cells. Insulin is made in the pancreas. It is released into the bloodstream in response to the presence of glucose in the blood. Think of insulin as a key. When insulin reaches a cell, it attaches to the cell wall. This signals the cell to create an opening that allows glucose to enter the cell.      When you have type 2 diabetes  Early in type 2 diabetes, your cells dont respond properly to insulin. Because of this, less glucose than normal moves into cells. This is called insulin resistance. In response, the pancreas makes more insulin. But eventually, the pancreas cant produce enough insulin to overcome insulin resistance. As less and less glucose enters cells, it builds up to a harmful level in the bloodstream. This is known as high blood sugar or hyperglycemia. The result is type 2 diabetes. The cells become starved for energy, which can leave you feeling tired and rundown.  Why high blood sugar is a problem  If high blood sugar is not controlled, blood vessels throughout the body become damaged. Prolonged high blood sugar affects organs, blood vessels, and nerves. As a result, the risks of damage to the heart, kidneys, eyes, and limbs increase. Diabetes also makes other problems, such as high blood pressure and high cholesterol, more dangerous. Over time, people with  uncontrolled high blood sugar have an increase in risk of dying of, or being disabled by, heart attack or stroke.  Date Last Reviewed: 7/1/2016  © 4858-6115 [employer]. [address] [place] [address], [place] [address]. All rights reserved. This information is not intended as a substitute for professional medical care. Always follow your healthcare professional's instructions.

## 2019-01-31 NOTE — TELEPHONE ENCOUNTER
Spoke with the patient, she instructed me to call her daughter to schedule her appt.  Left message for her to call us back to schedule the appt.

## 2019-01-31 NOTE — TELEPHONE ENCOUNTER
----- Message from Peter Ojeda sent at 1/31/2019 11:38 AM CST -----  [phone]- pt needs appt  Please call to schedule thanks

## 2019-01-31 NOTE — PROGRESS NOTES
"Subjective:       Patient ID: Dennis Patricio is a 68 y.o. female.    Chief Complaint: Establish Care    Ms. Patricio presents to the clinic today to establish care at this clinic.  She needs a PCP.  She has history of diabetes (diagnosed in November 2018), hypertension, CVA November 2018.  She has not followed up with Neurology since stroke.  She has not had diabetic education.  She stopped drinking sodas and stays away from sweets.  Reports only missing doses of medicine 2-3 times.  She still has some left sided weakness and is still working with PT who comes to her home.  She lives with son and grandson.  She is accompanied by daughter Yenni who is not very engaged in conversation today.  Patient is overdue for all health maintenance.  She is initially resistant to mammogram since "it hurts" but eventually agrees to schedule this.  She refuses immunizations.      Review of Systems   Constitutional: Negative for chills and fever.   HENT: Negative for congestion, ear pain and sinus pressure.    Eyes: Positive for visual disturbance (blurry).   Respiratory: Negative for cough and shortness of breath.    Cardiovascular: Negative for chest pain, palpitations and leg swelling.   Gastrointestinal: Negative for abdominal pain, constipation and diarrhea.   Endocrine: Negative for polydipsia and polyuria.   Neurological: Positive for weakness. Negative for dizziness, light-headedness, numbness and headaches.       Objective:      Physical Exam   Constitutional: She is oriented to person, place, and time. She appears well-nourished. No distress.   HENT:   Head: Normocephalic and atraumatic.   Right Ear: External ear normal.   Left Ear: External ear normal.   Mouth/Throat: Oropharynx is clear and moist. No oropharyngeal exudate.   Eyes: Pupils are equal, round, and reactive to light. Right eye exhibits no discharge. Left eye exhibits no discharge.   Neck: Neck supple. No thyromegaly present.   Cardiovascular: Normal rate " and regular rhythm. Exam reveals no gallop and no friction rub.   No murmur heard.  Pulses:       Dorsalis pedis pulses are 2+ on the right side, and 2+ on the left side.        Posterior tibial pulses are 2+ on the right side, and 2+ on the left side.   Pulmonary/Chest: Effort normal and breath sounds normal. No respiratory distress. She has no wheezes. She has no rales.   Abdominal: Soft. She exhibits no distension. There is no tenderness.   Musculoskeletal:        Right foot: There is deformity (hammer toes, thickened toe nails). There is normal range of motion.        Left foot: There is deformity (hammer toes, thickened toe nails). There is normal range of motion.   Feet:   Right Foot:   Protective Sensation: 10 sites tested. 10 sites sensed.   Skin Integrity: Positive for dry skin. Negative for ulcer, blister, skin breakdown, erythema, warmth or callus.   Left Foot:   Protective Sensation: 7 sites tested. 10 sites sensed.   Skin Integrity: Positive for callus (left 5th toe) and dry skin. Negative for ulcer, blister, skin breakdown, erythema or warmth.   Lymphadenopathy:     She has no cervical adenopathy.   Neurological: She is alert and oriented to person, place, and time. Coordination normal.   Left sided weakness   Skin: Skin is warm and dry.   Psychiatric: She has a normal mood and affect. Her behavior is normal. Thought content normal.   Vitals reviewed.          Current Outpatient Medications:     acetaminophen (TYLENOL) 325 MG tablet, Take 2 tablets (650 mg total) by mouth every 6 (six) hours as needed., Disp: , Rfl: 0    amLODIPine (NORVASC) 10 MG tablet, Take 1 tablet (10 mg total) by mouth once daily., Disp: 90 tablet, Rfl: 3    aspirin 81 MG Chew, Take 1 tablet (81 mg total) by mouth once daily., Disp: , Rfl: 0    atorvastatin (LIPITOR) 80 MG tablet, Take 1 tablet (80 mg total) by mouth every evening., Disp: 90 tablet, Rfl: 3    blood sugar diagnostic Strp, To check BG 3 times daily, to use  with insurance preferred meter, Disp: 100 each, Rfl: 3    blood-glucose meter kit, To check BG 3 times daily, to use with insurance preferred meter, Disp: 1 each, Rfl: 0    clopidogrel (PLAVIX) 75 mg tablet, Take 1 tablet (75 mg total) by mouth once daily., Disp: 90 tablet, Rfl: 3    hydroCHLOROthiazide (MICROZIDE) 12.5 mg capsule, Take 1 capsule (12.5 mg total) by mouth once daily., Disp: 90 capsule, Rfl: 3    lancets Misc, To check BG 3 times daily, to use with insurance preferred meter, Disp: 100 each, Rfl: 0    lisinopril (PRINIVIL,ZESTRIL) 20 MG tablet, Take 1 tablet (20 mg total) by mouth every evening., Disp: 90 tablet, Rfl: 3    metFORMIN (GLUCOPHAGE-XR) 500 MG 24 hr tablet, Take 2 tablets (1,000 mg total) by mouth before dinner., Disp: 180 tablet, Rfl: 3    tamsulosin (FLOMAX) 0.4 mg Cap, Take 1 capsule (0.4 mg total) by mouth once daily., Disp: 30 capsule, Rfl: 0  Assessment:       1. Type 2 diabetes mellitus without complication, without long-term current use of insulin    2. Hypertension associated with diabetes    3. Deficiency anemia    4. Need for hepatitis C screening test    5. History of CVA in adulthood    6. Screening for breast cancer        Plan:       Type 2 diabetes mellitus without complication, without long-term current use of insulin  No A1c on file.  Pt does not have glucometer to check blood sugars and requests one.  -     Hemoglobin A1c; Future; Expected date: 01/31/2019  -     MICROALBUMIN / CREATININE RATIO URINE; Future; Expected date: 01/31/2019  -     Ambulatory consult to Diabetic Education  -     Ambulatory referral to Outpatient Case Management  -     blood-glucose meter kit; To check BG 3 times daily, to use with insurance preferred meter  Dispense: 1 each; Refill: 0  -     lancets Misc; To check BG 3 times daily, to use with insurance preferred meter  Dispense: 100 each; Refill: 0  -     blood sugar diagnostic Strp; To check BG 3 times daily, to use with insurance  preferred meter  Dispense: 100 each; Refill: 3  -     Lipid panel; Future; Expected date: 01/31/2019  -     atorvastatin (LIPITOR) 80 MG tablet; Take 1 tablet (80 mg total) by mouth every evening.  Dispense: 90 tablet; Refill: 3  -     lisinopril (PRINIVIL,ZESTRIL) 20 MG tablet; Take 1 tablet (20 mg total) by mouth every evening.  Dispense: 90 tablet; Refill: 3  -     metFORMIN (GLUCOPHAGE-XR) 500 MG 24 hr tablet; Take 2 tablets (1,000 mg total) by mouth before dinner.  Dispense: 180 tablet; Refill: 3    Hypertension associated with diabetes  Stable, continue current medication  -     amLODIPine (NORVASC) 10 MG tablet; Take 1 tablet (10 mg total) by mouth once daily.  Dispense: 90 tablet; Refill: 3  -     hydroCHLOROthiazide (MICROZIDE) 12.5 mg capsule; Take 1 capsule (12.5 mg total) by mouth once daily.  Dispense: 90 capsule; Refill: 3    Deficiency anemia   -     CBC auto differential; Future; Expected date: 01/31/2019  -     Iron and TIBC; Future; Expected date: 01/31/2019  -     Ferritin; Future; Expected date: 01/31/2019    Need for hepatitis C screening test  -     Hepatitis C antibody; Future; Expected date: 01/31/2019    History of CVA in adulthood  Continue current medication.  Continue PT/OT at home.  -     atorvastatin (LIPITOR) 80 MG tablet; Take 1 tablet (80 mg total) by mouth every evening.  Dispense: 90 tablet; Refill: 3  -     clopidogrel (PLAVIX) 75 mg tablet; Take 1 tablet (75 mg total) by mouth once daily.  Dispense: 90 tablet; Refill: 3  -     Ambulatory referral to Neurology    Screening for breast cancer  -     Mammo Digital Screening Bilat; Future; Expected date: 01/31/2019    RTC 3 mos with PCP.    Patient readiness: acceptance and barriers:readiness    During the course of the visit the patient was educated and counseled about the following:     Diabetes:  Addressed ADA diet.  Discussed foot care.  Reminded to get yearly retinal exam.  Labs: hemoglobin A1C.  Hypertension:   Medication: no  change.    Goals: Diabetes: Maintain Hemoglobin A1C below 7 and Hypertension: Reduce Blood Pressure    Did patient meet goals/outcomes: Yes    The following self management tools provided: declined    Patient Instructions (the written plan) was given to the patient/family.     Time spent with patient: 30 minutes    Barriers to medications present (no )    Adverse reactions to current medications (no)    Over the counter medications reviewed (Yes)

## 2019-01-31 NOTE — PROGRESS NOTES
Thank you for the referral.  Patient has been assigned to Aliya Villegas LMSW for low risk screening for Outpatient Case Management.     Reason for referral:  Type 2 diabetes mellitus without complication, without long-term current use of insulin    Please contact hospitals at [phone]. [phone] with any questions.    Thank you,    Jennifer Daily, [facility]  Outpatient Care Mgmt.  [phone]

## 2019-01-31 NOTE — TELEPHONE ENCOUNTER
----- Message from Jennifer Daily sent at 1/31/2019 12:09 PM CST -----  Thank you for the referral.  Patient has been assigned to Aliya Villegas LMSW for low risk screening for Outpatient Case Management.      Reason for referral:  Type 2 diabetes mellitus without complication, without long-term current use of insulin     Please contact \[facility]\"" at [phone]. [phone] with any questions.     Thank you,     Jennifer Daily, [facility]  Outpatient Care Mgmt.  [phone]

## 2019-02-01 ENCOUNTER — OUTPATIENT CASE MANAGEMENT (OUTPATIENT)
Dept: ADMINISTRATIVE | Facility: OTHER | Age: 69
End: 2019-02-01

## 2019-02-01 DIAGNOSIS — E11.65 UNCONTROLLED TYPE 2 DIABETES MELLITUS WITH HYPERGLYCEMIA: Primary | ICD-10-CM

## 2019-02-01 DIAGNOSIS — D64.9 ANEMIA, UNSPECIFIED TYPE: ICD-10-CM

## 2019-02-01 DIAGNOSIS — E11.9 TYPE 2 DIABETES MELLITUS WITHOUT COMPLICATION, WITHOUT LONG-TERM CURRENT USE OF INSULIN: ICD-10-CM

## 2019-02-01 LAB — HCV AB SERPL QL IA: NEGATIVE

## 2019-02-01 RX ORDER — METFORMIN HYDROCHLORIDE 500 MG/1
1000 TABLET, EXTENDED RELEASE ORAL 2 TIMES DAILY WITH MEALS
Qty: 360 TABLET | Refills: 3 | Status: SHIPPED | OUTPATIENT
Start: 2019-02-01 | End: 2021-08-02 | Stop reason: SDUPTHER

## 2019-02-04 ENCOUNTER — TELEPHONE (OUTPATIENT)
Dept: FAMILY MEDICINE | Facility: CLINIC | Age: 69
End: 2019-02-04

## 2019-02-04 ENCOUNTER — OUTPATIENT CASE MANAGEMENT (OUTPATIENT)
Dept: ADMINISTRATIVE | Facility: OTHER | Age: 69
End: 2019-02-04

## 2019-02-04 NOTE — LETTER
February 4, 2019    Dennis Patricio  [address] [address] [place] [id number]             [facility] [address] [place] [address] Dear: Dennis Patricio    I am writing from the Outpatient Complex Care Management Department at [facility].  I received a referral from Ale Davis NP to contact you or your caregiver regarding any needs you may have. I have attempted to contact you or your cargeiver by phone two times unsuccessfully.  Please contact the Outpatient Complex Care Management Department at [phone] if you would like to discuss your needs.      Sincerely,         Aliya Villegas LMSW

## 2019-02-04 NOTE — TELEPHONE ENCOUNTER
----- Message from Aliya Villegas LMSW sent at 2/4/2019 10:25 AM CST -----  This SW received a referral on the above patient. I have attempted to contact the patient or caregiver by phone two times unsuccessfully and mailed a letter with our contact information.    Thank you for the referral,    Aliya Villegas LMSW

## 2019-02-06 ENCOUNTER — TELEPHONE (OUTPATIENT)
Dept: FAMILY MEDICINE | Facility: CLINIC | Age: 69
End: 2019-02-06

## 2019-02-06 NOTE — TELEPHONE ENCOUNTER
----- Message from Jamie Bardales sent at 2/6/2019 12:42 PM CST -----  Contact: Nilda/[facility]  Nilda called in & wanted to know if the attached patient is still supposed to be on tamsulosin (FLOMAX) 0.4 mg Cap?      Nilda's call back number is [phone]

## 2019-02-06 NOTE — TELEPHONE ENCOUNTER
Spoke with Nilda with Home Health if Mrs. Patricio should continue Flomax because she is out of refills. Per Ale Davis said she could stop Flomax , she did not see any need to continue if she is not having any kidney stones.

## 2019-02-19 ENCOUNTER — TELEPHONE (OUTPATIENT)
Dept: NEUROLOGY | Facility: CLINIC | Age: 69
End: 2019-02-19

## 2019-02-21 ENCOUNTER — TELEPHONE (OUTPATIENT)
Dept: FAMILY MEDICINE | Facility: CLINIC | Age: 69
End: 2019-02-21

## 2019-04-16 ENCOUNTER — PATIENT OUTREACH (OUTPATIENT)
Dept: ADMINISTRATIVE | Facility: HOSPITAL | Age: 69
End: 2019-04-16

## 2019-04-16 NOTE — LETTER
April 16, 2019    Dennis Patricio  [address] [address] [place] [id number]             [facility]  [address]  [facility] [address]  Phone: [phone] Dear Dennis Patricio,    [facility] is committed to your overall health.  To help you get the most out of each of your visits, we will review your information to make sure you are up to date on all of your recommended tests and/or procedures.      As a new patient to Dr. Janine Bonilla MD , we may not have your complete medical records. She has found that your chart shows you may be due for a:    COLORECTAL CANCER SCREENING  BONE MINERAL DENSITY SCAN (DEXA)  MAMMOGRAM  DIABETIC EYE EXAM    If you have had any of the above done at another facility, please bring the records or information with you so that your record at [facility] will be complete.      If you are currently taking medication, please bring it with you to your appointment for review.    Also, if you have any type of Advanced Directives, please bring them with you to your office visit so we may scan them into your chart.      Thank You,  Your [facility] Team  MD Jennifer Minor LPN Clinical Care Coordinator  [facility]  [address] [facility] [address]  Phone [phone]  Fax [phone]

## 2019-04-16 NOTE — PROGRESS NOTES
Health Maintenance Due   Topic Date Due    Eye Exam  09/10/1961    TETANUS VACCINE  09/10/1969    Mammogram  09/10/1991    DEXA SCAN  09/10/1991    Colonoscopy  09/10/2001    Zoster Vaccine  09/10/2011    Influenza Vaccine  08/01/2018

## 2019-05-31 ENCOUNTER — DOCUMENTATION ONLY (OUTPATIENT)
Dept: REHABILITATION | Facility: HOSPITAL | Age: 69
End: 2019-05-31

## 2019-05-31 NOTE — PROGRESS NOTES
REHAB SERVICES OUTPATIENT DISCHARGE SUMMARY  Speech Therapy      Name:  Dennis Patricio  Date:  5/31/2019  Date of Evaluation:  12/3/2018  Physician:    Total # Of Visits:  1  Cancelled:  0  No Shows:  7  Diagnosis:  No diagnosis found.    Physical/Functional Status:  At time of discharge, patient had attended no tx sessions    The patient is to be discharged from our Therapy service for the following reason(s):  Patient has not attended therapy since evaluation    Degree of Goal Achievement:  Patient has not met goals secondary to:  Did not attend tx    Patient Education:  none    Discharge Plan:  Other:  none

## 2021-08-02 ENCOUNTER — HOSPITAL ENCOUNTER (EMERGENCY)
Facility: HOSPITAL | Age: 71
Discharge: HOME | End: 2021-08-02
Attending: EMERGENCY MEDICINE
Payer: MEDICARE

## 2021-08-02 VITALS
RESPIRATION RATE: 19 BRPM | TEMPERATURE: 99 F | WEIGHT: 160.94 LBS | BODY MASS INDEX: 24.39 KG/M2 | HEIGHT: 68 IN | DIASTOLIC BLOOD PRESSURE: 86 MMHG | OXYGEN SATURATION: 100 % | HEART RATE: 74 BPM | SYSTOLIC BLOOD PRESSURE: 188 MMHG

## 2021-08-02 DIAGNOSIS — E11.9 TYPE 2 DIABETES MELLITUS WITHOUT COMPLICATION, WITHOUT LONG-TERM CURRENT USE OF INSULIN: ICD-10-CM
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
## 2021-08-02 DIAGNOSIS — I15.2 HYPERTENSION ASSOCIATED WITH DIABETES: ICD-10-CM

## 2021-08-02 DIAGNOSIS — Z86.73 HISTORY OF CVA IN ADULTHOOD: ICD-10-CM

## 2021-08-02 DIAGNOSIS — T07.XXXA MULTIPLE ABRASIONS: Primary | ICD-10-CM

## 2021-08-02 DIAGNOSIS — E11.59 HYPERTENSION ASSOCIATED WITH DIABETES: ICD-10-CM

## 2021-08-02 DIAGNOSIS — R73.9 HYPERGLYCEMIA: ICD-10-CM

## 2021-08-02 DIAGNOSIS — I10 HYPERTENSION, UNSPECIFIED TYPE: ICD-10-CM

## 2021-08-02 DIAGNOSIS — S20.212A CONTUSION OF RIB ON LEFT SIDE, INITIAL ENCOUNTER: ICD-10-CM

## 2021-08-02 DIAGNOSIS — Z91.148 HISTORY OF MEDICATION NONCOMPLIANCE: ICD-10-CM

## 2021-08-02 LAB
ALBUMIN SERPL BCP-MCNC: 3.7 G/DL (ref 3.5–5.2)
ALP SERPL-CCNC: 85 U/L (ref 55–135)
ALT SERPL W/O P-5'-P-CCNC: 16 U/L (ref 10–44)
ANION GAP SERPL CALC-SCNC: 11 MMOL/L (ref 8–16)
AST SERPL-CCNC: 12 U/L (ref 10–40)
BASOPHILS # BLD AUTO: 0.03 K/UL (ref 0–0.2)
BASOPHILS NFR BLD: 0.3 % (ref 0–1.9)
BILIRUB SERPL-MCNC: 0.5 MG/DL (ref 0.1–1)
BUN SERPL-MCNC: 8 MG/DL (ref 8–23)
CALCIUM SERPL-MCNC: 9.2 MG/DL (ref 8.7–10.5)
CHLORIDE SERPL-SCNC: 108 MMOL/L (ref 95–110)
CO2 SERPL-SCNC: 24 MMOL/L (ref 23–29)
CREAT SERPL-MCNC: 0.9 MG/DL (ref 0.5–1.4)
DIFFERENTIAL METHOD: ABNORMAL
EOSINOPHIL # BLD AUTO: 0.2 K/UL (ref 0–0.5)
EOSINOPHIL NFR BLD: 1.8 % (ref 0–8)
ERYTHROCYTE [DISTWIDTH] IN BLOOD BY AUTOMATED COUNT: 13.6 % (ref 11.5–14.5)
EST. GFR  (AFRICAN AMERICAN): >60 ML/MIN/1.73 M^2
EST. GFR  (NON AFRICAN AMERICAN): >60 ML/MIN/1.73 M^2
GLUCOSE SERPL-MCNC: 236 MG/DL (ref 70–110)
HCT VFR BLD AUTO: 38.1 % (ref 37–48.5)
HGB BLD-MCNC: 11.9 G/DL (ref 12–16)
IMM GRANULOCYTES # BLD AUTO: 0.02 K/UL (ref 0–0.04)
IMM GRANULOCYTES NFR BLD AUTO: 0.2 % (ref 0–0.5)
LYMPHOCYTES # BLD AUTO: 2.7 K/UL (ref 1–4.8)
LYMPHOCYTES NFR BLD: 28.3 % (ref 18–48)
MCH RBC QN AUTO: 28.5 PG (ref 27–31)
MCHC RBC AUTO-ENTMCNC: 31.2 G/DL (ref 32–36)
MCV RBC AUTO: 91 FL (ref 82–98)
MONOCYTES # BLD AUTO: 1 K/UL (ref 0.3–1)
MONOCYTES NFR BLD: 10.7 % (ref 4–15)
NEUTROPHILS # BLD AUTO: 5.5 K/UL (ref 1.8–7.7)
NEUTROPHILS NFR BLD: 58.7 % (ref 38–73)
NRBC BLD-RTO: 0 /100 WBC
PLATELET # BLD AUTO: 275 K/UL (ref 150–450)
PMV BLD AUTO: 10.1 FL (ref 9.2–12.9)
POCT GLUCOSE: 208 MG/DL (ref 70–110)
POTASSIUM SERPL-SCNC: 3.8 MMOL/L (ref 3.5–5.1)
PROT SERPL-MCNC: 7.3 G/DL (ref 6–8.4)
RBC # BLD AUTO: 4.17 M/UL (ref 4–5.4)
SODIUM SERPL-SCNC: 143 MMOL/L (ref 136–145)
WBC # BLD AUTO: 9.38 K/UL (ref 3.9–12.7)

## 2021-08-02 PROCEDURE — 90471 IMMUNIZATION ADMIN: CPT | Performed by: EMERGENCY MEDICINE

## 2021-08-02 PROCEDURE — 63600175 PHARM REV CODE 636 W HCPCS: Performed by: EMERGENCY MEDICINE

## 2021-08-02 PROCEDURE — 80053 COMPREHEN METABOLIC PANEL: CPT | Performed by: EMERGENCY MEDICINE

## 2021-08-02 PROCEDURE — 90715 TDAP VACCINE 7 YRS/> IM: CPT | Performed by: EMERGENCY MEDICINE

## 2021-08-02 PROCEDURE — 99284 EMERGENCY DEPT VISIT MOD MDM: CPT | Mod: 25

## 2021-08-02 PROCEDURE — 25000003 PHARM REV CODE 250: Performed by: EMERGENCY MEDICINE

## 2021-08-02 PROCEDURE — 85025 COMPLETE CBC W/AUTO DIFF WBC: CPT | Performed by: EMERGENCY MEDICINE

## 2021-08-02 PROCEDURE — 36415 COLL VENOUS BLD VENIPUNCTURE: CPT | Performed by: EMERGENCY MEDICINE

## 2021-08-02 PROCEDURE — 82962 GLUCOSE BLOOD TEST: CPT

## 2021-08-02 RX ORDER — ACETAMINOPHEN 500 MG
1000 TABLET ORAL
Status: COMPLETED | OUTPATIENT
Start: 2021-08-02 | End: 2021-08-02

## 2021-08-02 RX ORDER — LISINOPRIL 20 MG/1
20 TABLET ORAL NIGHTLY
Qty: 30 TABLET | Refills: 1 | Status: SHIPPED | OUTPATIENT
Start: 2021-08-02 | End: 2022-08-02

## 2021-08-02 RX ORDER — LISINOPRIL 10 MG/1
20 TABLET ORAL
Status: COMPLETED | OUTPATIENT
Start: 2021-08-02 | End: 2021-08-02

## 2021-08-02 RX ORDER — AMLODIPINE BESYLATE 10 MG/1
10 TABLET ORAL DAILY
Qty: 30 TABLET | Refills: 1 | Status: SHIPPED | OUTPATIENT
Start: 2021-08-02 | End: 2022-08-02

## 2021-08-02 RX ORDER — METFORMIN HYDROCHLORIDE 500 MG/1
1000 TABLET, EXTENDED RELEASE ORAL 2 TIMES DAILY WITH MEALS
Qty: 60 TABLET | Refills: 1 | Status: SHIPPED | OUTPATIENT
Start: 2021-08-02 | End: 2022-08-02

## 2021-08-02 RX ORDER — HYDROCHLOROTHIAZIDE 12.5 MG/1
12.5 CAPSULE ORAL DAILY
Qty: 30 CAPSULE | Refills: 1 | Status: SHIPPED | OUTPATIENT
Start: 2021-08-02 | End: 2022-08-02

## 2021-08-02 RX ORDER — AMLODIPINE BESYLATE 5 MG/1
10 TABLET ORAL
Status: COMPLETED | OUTPATIENT
Start: 2021-08-02 | End: 2021-08-02

## 2021-08-02 RX ORDER — CLOPIDOGREL BISULFATE 75 MG/1
75 TABLET ORAL DAILY
Qty: 30 TABLET | Refills: 1 | Status: SHIPPED | OUTPATIENT
Start: 2021-08-02 | End: 2022-08-02

## 2021-08-02 RX ADMIN — TETANUS TOXOID, REDUCED DIPHTHERIA TOXOID AND ACELLULAR PERTUSSIS VACCINE, ADSORBED 0.5 ML: 5; 2.5; 8; 8; 2.5 SUSPENSION INTRAMUSCULAR at 03:08

## 2021-08-02 RX ADMIN — ACETAMINOPHEN 1000 MG: 500 TABLET ORAL at 03:08

## 2021-08-02 RX ADMIN — LISINOPRIL 20 MG: 10 TABLET ORAL at 03:08

## 2021-08-02 RX ADMIN — AMLODIPINE BESYLATE 10 MG: 5 TABLET ORAL at 03:08

## 2021-08-23 ENCOUNTER — TELEPHONE (OUTPATIENT)
Dept: FAMILY MEDICINE | Facility: CLINIC | Age: 71
End: 2021-08-23

## 2021-09-09 ENCOUNTER — TELEPHONE (OUTPATIENT)
Dept: FAMILY MEDICINE | Facility: CLINIC | Age: 71
End: 2021-09-09

## 2021-09-24 ENCOUNTER — TELEPHONE (OUTPATIENT)
Dept: FAMILY MEDICINE | Facility: CLINIC | Age: 71
End: 2021-09-24

## 2021-12-25 ENCOUNTER — HOSPITAL ENCOUNTER (INPATIENT)
Facility: HOSPITAL | Age: 71
LOS: 4 days | Discharge: NURSING, REHAB OR LONG-TERM CARE | DRG: 065 | End: 2021-12-29
Attending: EMERGENCY MEDICINE | Admitting: INTERNAL MEDICINE
Payer: MEDICARE

## 2021-12-25 DIAGNOSIS — I63.9 STROKE: ICD-10-CM

## 2021-12-25 DIAGNOSIS — I63.412 STROKE DUE TO EMBOLISM OF LEFT MIDDLE CEREBRAL ARTERY: ICD-10-CM

## 2021-12-25 DIAGNOSIS — H53.9 VISION DISTURBANCE FOLLOWING CVA (CEREBROVASCULAR ACCIDENT): ICD-10-CM

## 2021-12-25 DIAGNOSIS — Z78.9 IMPAIRED INSTRUMENTAL ACTIVITIES OF DAILY LIVING (IADL): Primary | ICD-10-CM

## 2021-12-25 DIAGNOSIS — I63.9 CVA (CEREBRAL VASCULAR ACCIDENT): ICD-10-CM

## 2021-12-25 DIAGNOSIS — I69.398 VISION DISTURBANCE FOLLOWING CVA (CEREBROVASCULAR ACCIDENT): ICD-10-CM

## 2021-12-25 PROBLEM — Z71.89 GOALS OF CARE, COUNSELING/DISCUSSION: Status: ACTIVE | Noted: 2021-12-25

## 2021-12-25 LAB
ALBUMIN SERPL BCP-MCNC: 3.8 G/DL (ref 3.5–5.2)
ALP SERPL-CCNC: 85 U/L (ref 55–135)
ALT SERPL W/O P-5'-P-CCNC: 22 U/L (ref 10–44)
ANION GAP SERPL CALC-SCNC: 8 MMOL/L (ref 8–16)
AST SERPL-CCNC: 17 U/L (ref 10–40)
BASOPHILS # BLD AUTO: 0.02 K/UL (ref 0–0.2)
BASOPHILS NFR BLD: 0.3 % (ref 0–1.9)
BILIRUB SERPL-MCNC: 0.2 MG/DL (ref 0.1–1)
BUN SERPL-MCNC: 14 MG/DL (ref 8–23)
CALCIUM SERPL-MCNC: 9.5 MG/DL (ref 8.7–10.5)
CHLORIDE SERPL-SCNC: 106 MMOL/L (ref 95–110)
CO2 SERPL-SCNC: 26 MMOL/L (ref 23–29)
CREAT SERPL-MCNC: 1.1 MG/DL (ref 0.5–1.4)
DIFFERENTIAL METHOD: ABNORMAL
EOSINOPHIL # BLD AUTO: 0.1 K/UL (ref 0–0.5)
EOSINOPHIL NFR BLD: 2.2 % (ref 0–8)
ERYTHROCYTE [DISTWIDTH] IN BLOOD BY AUTOMATED COUNT: 12.9 % (ref 11.5–14.5)
EST. GFR  (AFRICAN AMERICAN): 59 ML/MIN/1.73 M^2
EST. GFR  (NON AFRICAN AMERICAN): 51 ML/MIN/1.73 M^2
GLUCOSE SERPL-MCNC: 277 MG/DL (ref 70–110)
HCT VFR BLD AUTO: 37.1 % (ref 37–48.5)
HGB BLD-MCNC: 11.8 G/DL (ref 12–16)
IMM GRANULOCYTES # BLD AUTO: 0.02 K/UL (ref 0–0.04)
IMM GRANULOCYTES NFR BLD AUTO: 0.3 % (ref 0–0.5)
INR PPP: 1 (ref 0.8–1.2)
LYMPHOCYTES # BLD AUTO: 2.6 K/UL (ref 1–4.8)
LYMPHOCYTES NFR BLD: 40.9 % (ref 18–48)
MCH RBC QN AUTO: 28.9 PG (ref 27–31)
MCHC RBC AUTO-ENTMCNC: 31.8 G/DL (ref 32–36)
MCV RBC AUTO: 91 FL (ref 82–98)
MONOCYTES # BLD AUTO: 0.7 K/UL (ref 0.3–1)
MONOCYTES NFR BLD: 10.3 % (ref 4–15)
NEUTROPHILS # BLD AUTO: 2.9 K/UL (ref 1.8–7.7)
NEUTROPHILS NFR BLD: 46 % (ref 38–73)
NRBC BLD-RTO: 0 /100 WBC
PLATELET # BLD AUTO: 275 K/UL (ref 150–450)
PMV BLD AUTO: 9.9 FL (ref 9.2–12.9)
POCT GLUCOSE: 283 MG/DL (ref 70–110)
POCT GLUCOSE: 448 MG/DL (ref 70–110)
POTASSIUM SERPL-SCNC: 4.4 MMOL/L (ref 3.5–5.1)
PROT SERPL-MCNC: 7.4 G/DL (ref 6–8.4)
PROTHROMBIN TIME: 10.2 SEC (ref 9–12.5)
RBC # BLD AUTO: 4.08 M/UL (ref 4–5.4)
SARS-COV-2 RDRP RESP QL NAA+PROBE: NEGATIVE
SODIUM SERPL-SCNC: 140 MMOL/L (ref 136–145)
TSH SERPL DL<=0.005 MIU/L-ACNC: 1.42 UIU/ML (ref 0.4–4)
WBC # BLD AUTO: 6.4 K/UL (ref 3.9–12.7)

## 2021-12-25 PROCEDURE — 99291 CRITICAL CARE FIRST HOUR: CPT | Mod: 25

## 2021-12-25 PROCEDURE — 96374 THER/PROPH/DIAG INJ IV PUSH: CPT

## 2021-12-25 PROCEDURE — 11000001 HC ACUTE MED/SURG PRIVATE ROOM

## 2021-12-25 PROCEDURE — 63600175 PHARM REV CODE 636 W HCPCS: Performed by: EMERGENCY MEDICINE

## 2021-12-25 PROCEDURE — 63600175 PHARM REV CODE 636 W HCPCS: Performed by: NURSE PRACTITIONER

## 2021-12-25 PROCEDURE — 25500020 PHARM REV CODE 255

## 2021-12-25 PROCEDURE — 93005 ELECTROCARDIOGRAM TRACING: CPT

## 2021-12-25 PROCEDURE — 25000003 PHARM REV CODE 250: Performed by: INTERNAL MEDICINE

## 2021-12-25 PROCEDURE — 36415 COLL VENOUS BLD VENIPUNCTURE: CPT | Performed by: EMERGENCY MEDICINE

## 2021-12-25 PROCEDURE — 80053 COMPREHEN METABOLIC PANEL: CPT | Performed by: EMERGENCY MEDICINE

## 2021-12-25 PROCEDURE — G0426 PR INPT TELEHEALTH CONSULT 50M: ICD-10-PCS | Mod: 95,G0,, | Performed by: PSYCHIATRY & NEUROLOGY

## 2021-12-25 PROCEDURE — 63600175 PHARM REV CODE 636 W HCPCS: Performed by: INTERNAL MEDICINE

## 2021-12-25 PROCEDURE — 25000003 PHARM REV CODE 250: Performed by: EMERGENCY MEDICINE

## 2021-12-25 PROCEDURE — U0002 COVID-19 LAB TEST NON-CDC: HCPCS | Performed by: EMERGENCY MEDICINE

## 2021-12-25 PROCEDURE — 82962 GLUCOSE BLOOD TEST: CPT

## 2021-12-25 PROCEDURE — G0426 INPT/ED TELECONSULT50: HCPCS | Mod: 95,G0,, | Performed by: PSYCHIATRY & NEUROLOGY

## 2021-12-25 PROCEDURE — 84443 ASSAY THYROID STIM HORMONE: CPT | Performed by: EMERGENCY MEDICINE

## 2021-12-25 PROCEDURE — 85610 PROTHROMBIN TIME: CPT | Performed by: EMERGENCY MEDICINE

## 2021-12-25 PROCEDURE — 85025 COMPLETE CBC W/AUTO DIFF WBC: CPT | Performed by: EMERGENCY MEDICINE

## 2021-12-25 RX ORDER — IBUPROFEN 200 MG
16 TABLET ORAL
Status: DISCONTINUED | OUTPATIENT
Start: 2021-12-25 | End: 2021-12-29 | Stop reason: HOSPADM

## 2021-12-25 RX ORDER — LISINOPRIL 10 MG/1
20 TABLET ORAL
Status: COMPLETED | OUTPATIENT
Start: 2021-12-25 | End: 2021-12-25

## 2021-12-25 RX ORDER — TAMSULOSIN HYDROCHLORIDE 0.4 MG/1
0.4 CAPSULE ORAL DAILY
Status: CANCELLED | OUTPATIENT
Start: 2021-12-25

## 2021-12-25 RX ORDER — ONDANSETRON 2 MG/ML
4 INJECTION INTRAMUSCULAR; INTRAVENOUS EVERY 6 HOURS PRN
Status: DISCONTINUED | OUTPATIENT
Start: 2021-12-25 | End: 2021-12-29 | Stop reason: HOSPADM

## 2021-12-25 RX ORDER — HYDRALAZINE HYDROCHLORIDE 20 MG/ML
10 INJECTION INTRAMUSCULAR; INTRAVENOUS EVERY 6 HOURS PRN
Status: DISCONTINUED | OUTPATIENT
Start: 2021-12-25 | End: 2021-12-29 | Stop reason: HOSPADM

## 2021-12-25 RX ORDER — GLUCAGON 1 MG
1 KIT INJECTION
Status: DISCONTINUED | OUTPATIENT
Start: 2021-12-25 | End: 2021-12-29 | Stop reason: HOSPADM

## 2021-12-25 RX ORDER — ACETAMINOPHEN 325 MG/1
650 TABLET ORAL EVERY 6 HOURS PRN
Status: DISCONTINUED | OUTPATIENT
Start: 2021-12-25 | End: 2021-12-29 | Stop reason: HOSPADM

## 2021-12-25 RX ORDER — METOCLOPRAMIDE HYDROCHLORIDE 5 MG/ML
10 INJECTION INTRAMUSCULAR; INTRAVENOUS
Status: COMPLETED | OUTPATIENT
Start: 2021-12-25 | End: 2021-12-25

## 2021-12-25 RX ORDER — ASPIRIN 325 MG
325 TABLET ORAL DAILY
Status: DISCONTINUED | OUTPATIENT
Start: 2021-12-25 | End: 2021-12-26

## 2021-12-25 RX ORDER — HYDROCHLOROTHIAZIDE 25 MG/1
25 TABLET ORAL
Status: COMPLETED | OUTPATIENT
Start: 2021-12-25 | End: 2021-12-25

## 2021-12-25 RX ORDER — LABETALOL HYDROCHLORIDE 5 MG/ML
20 INJECTION, SOLUTION INTRAVENOUS
Status: COMPLETED | OUTPATIENT
Start: 2021-12-25 | End: 2021-12-25

## 2021-12-25 RX ORDER — AMLODIPINE BESYLATE 5 MG/1
10 TABLET ORAL
Status: COMPLETED | OUTPATIENT
Start: 2021-12-25 | End: 2021-12-25

## 2021-12-25 RX ORDER — CLOPIDOGREL BISULFATE 75 MG/1
75 TABLET ORAL DAILY
Status: CANCELLED | OUTPATIENT
Start: 2021-12-25

## 2021-12-25 RX ORDER — LABETALOL HYDROCHLORIDE 5 MG/ML
20 INJECTION, SOLUTION INTRAVENOUS EVERY 6 HOURS PRN
Status: DISCONTINUED | OUTPATIENT
Start: 2021-12-25 | End: 2021-12-29 | Stop reason: HOSPADM

## 2021-12-25 RX ORDER — IBUPROFEN 200 MG
24 TABLET ORAL
Status: DISCONTINUED | OUTPATIENT
Start: 2021-12-25 | End: 2021-12-29 | Stop reason: HOSPADM

## 2021-12-25 RX ORDER — MUPIROCIN 20 MG/G
OINTMENT TOPICAL 2 TIMES DAILY
Status: DISCONTINUED | OUTPATIENT
Start: 2021-12-25 | End: 2021-12-29 | Stop reason: HOSPADM

## 2021-12-25 RX ORDER — OXYCODONE HYDROCHLORIDE 10 MG/1
10 TABLET ORAL
Status: COMPLETED | OUTPATIENT
Start: 2021-12-25 | End: 2021-12-25

## 2021-12-25 RX ORDER — INSULIN ASPART 100 [IU]/ML
1-10 INJECTION, SOLUTION INTRAVENOUS; SUBCUTANEOUS
Status: DISCONTINUED | OUTPATIENT
Start: 2021-12-25 | End: 2021-12-29 | Stop reason: HOSPADM

## 2021-12-25 RX ADMIN — HYDROCHLOROTHIAZIDE 25 MG: 25 TABLET ORAL at 10:12

## 2021-12-25 RX ADMIN — IOHEXOL 75 ML: 350 INJECTION, SOLUTION INTRAVENOUS at 09:12

## 2021-12-25 RX ADMIN — AMLODIPINE BESYLATE 10 MG: 5 TABLET ORAL at 10:12

## 2021-12-25 RX ADMIN — LABETALOL HYDROCHLORIDE 20 MG: 5 INJECTION, SOLUTION INTRAVENOUS at 09:12

## 2021-12-25 RX ADMIN — ASPIRIN 325 MG ORAL TABLET 325 MG: 325 PILL ORAL at 10:12

## 2021-12-25 RX ADMIN — LISINOPRIL 20 MG: 10 TABLET ORAL at 10:12

## 2021-12-25 RX ADMIN — INSULIN ASPART 5 UNITS: 100 INJECTION, SOLUTION INTRAVENOUS; SUBCUTANEOUS at 10:12

## 2021-12-25 RX ADMIN — METOCLOPRAMIDE 10 MG: 5 INJECTION, SOLUTION INTRAMUSCULAR; INTRAVENOUS at 03:12

## 2021-12-25 RX ADMIN — MUPIROCIN: 20 OINTMENT TOPICAL at 07:12

## 2021-12-25 RX ADMIN — OXYCODONE HYDROCHLORIDE 10 MG: 10 TABLET ORAL at 11:12

## 2021-12-25 RX ADMIN — HYDRALAZINE HYDROCHLORIDE 10 MG: 20 INJECTION INTRAMUSCULAR; INTRAVENOUS at 07:12

## 2021-12-26 PROBLEM — I63.412 STROKE DUE TO EMBOLISM OF LEFT MIDDLE CEREBRAL ARTERY: Status: ACTIVE | Noted: 2018-11-09

## 2021-12-26 LAB
ALLENS TEST: ABNORMAL
AMMONIA PLAS-SCNC: 32 UMOL/L (ref 10–50)
CHOLEST SERPL-MCNC: 166 MG/DL (ref 120–199)
CHOLEST/HDLC SERPL: 2.5 {RATIO} (ref 2–5)
DELSYS: ABNORMAL
ESTIMATED AVG GLUCOSE: 237 MG/DL (ref 68–131)
HBA1C MFR BLD: 9.9 % (ref 4–5.6)
HCO3 UR-SCNC: 24.2 MMOL/L (ref 24–28)
HDLC SERPL-MCNC: 66 MG/DL (ref 40–75)
HDLC SERPL: 39.8 % (ref 20–50)
LDLC SERPL CALC-MCNC: 92.6 MG/DL (ref 63–159)
NONHDLC SERPL-MCNC: 100 MG/DL
PCO2 BLDA: 45.6 MMHG (ref 35–45)
PH SMN: 7.33 [PH] (ref 7.35–7.45)
PO2 BLDA: 63 MMHG (ref 80–100)
POC BE: -2 MMOL/L
POC SATURATED O2: 90 % (ref 95–100)
POC TCO2: 26 MMOL/L (ref 23–27)
POCT GLUCOSE: 199 MG/DL (ref 70–110)
POCT GLUCOSE: 277 MG/DL (ref 70–110)
POCT GLUCOSE: 398 MG/DL (ref 70–110)
POCT GLUCOSE: 468 MG/DL (ref 70–110)
SAMPLE: ABNORMAL
SITE: ABNORMAL
TRIGL SERPL-MCNC: 37 MG/DL (ref 30–150)

## 2021-12-26 PROCEDURE — 25000003 PHARM REV CODE 250: Performed by: INTERNAL MEDICINE

## 2021-12-26 PROCEDURE — 63600175 PHARM REV CODE 636 W HCPCS: Performed by: INTERNAL MEDICINE

## 2021-12-26 PROCEDURE — 11000001 HC ACUTE MED/SURG PRIVATE ROOM

## 2021-12-26 PROCEDURE — 25000003 PHARM REV CODE 250: Performed by: NURSE PRACTITIONER

## 2021-12-26 PROCEDURE — 80061 LIPID PANEL: CPT | Performed by: INTERNAL MEDICINE

## 2021-12-26 PROCEDURE — 36415 COLL VENOUS BLD VENIPUNCTURE: CPT | Performed by: NURSE PRACTITIONER

## 2021-12-26 PROCEDURE — 25000003 PHARM REV CODE 250: Performed by: EMERGENCY MEDICINE

## 2021-12-26 PROCEDURE — 36415 COLL VENOUS BLD VENIPUNCTURE: CPT | Performed by: INTERNAL MEDICINE

## 2021-12-26 PROCEDURE — 94761 N-INVAS EAR/PLS OXIMETRY MLT: CPT

## 2021-12-26 PROCEDURE — 95819 EEG AWAKE AND ASLEEP: CPT | Mod: 26,,, | Performed by: PSYCHIATRY & NEUROLOGY

## 2021-12-26 PROCEDURE — 95819 PR EEG,W/AWAKE & ASLEEP RECORD: ICD-10-PCS | Mod: 26,,, | Performed by: PSYCHIATRY & NEUROLOGY

## 2021-12-26 PROCEDURE — 99900035 HC TECH TIME PER 15 MIN (STAT)

## 2021-12-26 PROCEDURE — 82803 BLOOD GASES ANY COMBINATION: CPT

## 2021-12-26 PROCEDURE — 82140 ASSAY OF AMMONIA: CPT | Performed by: INTERNAL MEDICINE

## 2021-12-26 PROCEDURE — C9399 UNCLASSIFIED DRUGS OR BIOLOG: HCPCS | Performed by: NURSE PRACTITIONER

## 2021-12-26 PROCEDURE — 36600 WITHDRAWAL OF ARTERIAL BLOOD: CPT

## 2021-12-26 PROCEDURE — 83036 HEMOGLOBIN GLYCOSYLATED A1C: CPT | Performed by: NURSE PRACTITIONER

## 2021-12-26 RX ORDER — LORAZEPAM 2 MG/ML
1 INJECTION INTRAMUSCULAR ONCE
Status: COMPLETED | OUTPATIENT
Start: 2021-12-26 | End: 2021-12-26

## 2021-12-26 RX ORDER — NAPROXEN SODIUM 220 MG/1
81 TABLET, FILM COATED ORAL DAILY
Status: DISCONTINUED | OUTPATIENT
Start: 2021-12-26 | End: 2021-12-29 | Stop reason: HOSPADM

## 2021-12-26 RX ORDER — HEPARIN SODIUM 5000 [USP'U]/ML
5000 INJECTION, SOLUTION INTRAVENOUS; SUBCUTANEOUS EVERY 8 HOURS
Status: DISCONTINUED | OUTPATIENT
Start: 2021-12-26 | End: 2021-12-29 | Stop reason: HOSPADM

## 2021-12-26 RX ORDER — FAMOTIDINE 20 MG/1
20 TABLET, FILM COATED ORAL 2 TIMES DAILY
Status: DISCONTINUED | OUTPATIENT
Start: 2021-12-26 | End: 2021-12-29

## 2021-12-26 RX ORDER — ATORVASTATIN CALCIUM 40 MG/1
80 TABLET, FILM COATED ORAL NIGHTLY
Status: DISCONTINUED | OUTPATIENT
Start: 2021-12-26 | End: 2021-12-29 | Stop reason: HOSPADM

## 2021-12-26 RX ORDER — CLOPIDOGREL BISULFATE 75 MG/1
75 TABLET ORAL DAILY
Status: DISCONTINUED | OUTPATIENT
Start: 2021-12-26 | End: 2021-12-29 | Stop reason: HOSPADM

## 2021-12-26 RX ORDER — ATORVASTATIN CALCIUM 40 MG/1
40 TABLET, FILM COATED ORAL NIGHTLY
Status: DISCONTINUED | OUTPATIENT
Start: 2021-12-26 | End: 2021-12-26

## 2021-12-26 RX ADMIN — HEPARIN SODIUM 5000 UNITS: 5000 INJECTION INTRAVENOUS; SUBCUTANEOUS at 01:12

## 2021-12-26 RX ADMIN — INSULIN ASPART 1 UNITS: 100 INJECTION, SOLUTION INTRAVENOUS; SUBCUTANEOUS at 08:12

## 2021-12-26 RX ADMIN — ATORVASTATIN CALCIUM 80 MG: 40 TABLET, FILM COATED ORAL at 08:12

## 2021-12-26 RX ADMIN — ASPIRIN 325 MG ORAL TABLET 325 MG: 325 PILL ORAL at 08:12

## 2021-12-26 RX ADMIN — HYDRALAZINE HYDROCHLORIDE 10 MG: 20 INJECTION INTRAMUSCULAR; INTRAVENOUS at 12:12

## 2021-12-26 RX ADMIN — INSULIN DETEMIR 10 UNITS: 100 INJECTION, SOLUTION SUBCUTANEOUS at 12:12

## 2021-12-26 RX ADMIN — INSULIN DETEMIR 10 UNITS: 100 INJECTION, SOLUTION SUBCUTANEOUS at 08:12

## 2021-12-26 RX ADMIN — HEPARIN SODIUM 5000 UNITS: 5000 INJECTION INTRAVENOUS; SUBCUTANEOUS at 10:12

## 2021-12-26 RX ADMIN — INSULIN ASPART 10 UNITS: 100 INJECTION, SOLUTION INTRAVENOUS; SUBCUTANEOUS at 10:12

## 2021-12-26 RX ADMIN — LORAZEPAM 1 MG: 2 INJECTION INTRAMUSCULAR; INTRAVENOUS at 04:12

## 2021-12-26 RX ADMIN — MUPIROCIN: 20 OINTMENT TOPICAL at 10:12

## 2021-12-26 RX ADMIN — FAMOTIDINE 20 MG: 20 TABLET, FILM COATED ORAL at 08:12

## 2021-12-26 RX ADMIN — MUPIROCIN: 20 OINTMENT TOPICAL at 08:12

## 2021-12-26 RX ADMIN — INSULIN ASPART 10 UNITS: 100 INJECTION, SOLUTION INTRAVENOUS; SUBCUTANEOUS at 05:12

## 2021-12-26 RX ADMIN — INSULIN ASPART 3 UNITS: 100 INJECTION, SOLUTION INTRAVENOUS; SUBCUTANEOUS at 04:12

## 2021-12-26 RX ADMIN — CLOPIDOGREL 75 MG: 75 TABLET, FILM COATED ORAL at 10:12

## 2021-12-26 RX ADMIN — HYDRALAZINE HYDROCHLORIDE 10 MG: 20 INJECTION INTRAMUSCULAR; INTRAVENOUS at 08:12

## 2021-12-27 LAB
BACTERIA #/AREA URNS HPF: ABNORMAL /HPF
BILIRUB UR QL STRIP: NEGATIVE
CLARITY UR: ABNORMAL
COLOR UR: YELLOW
GLUCOSE UR QL STRIP: ABNORMAL
HGB UR QL STRIP: ABNORMAL
HYALINE CASTS #/AREA URNS LPF: 0 /LPF
KETONES UR QL STRIP: NEGATIVE
LEUKOCYTE ESTERASE UR QL STRIP: ABNORMAL
MICROSCOPIC COMMENT: ABNORMAL
NITRITE UR QL STRIP: NEGATIVE
PH UR STRIP: 6 [PH] (ref 5–8)
POCT GLUCOSE: 211 MG/DL (ref 70–110)
POCT GLUCOSE: 242 MG/DL (ref 70–110)
PROT UR QL STRIP: ABNORMAL
RBC #/AREA URNS HPF: 18 /HPF (ref 0–4)
SP GR UR STRIP: 1.02 (ref 1–1.03)
URN SPEC COLLECT METH UR: ABNORMAL
UROBILINOGEN UR STRIP-ACNC: NEGATIVE EU/DL
WBC #/AREA URNS HPF: >100 /HPF (ref 0–5)
YEAST URNS QL MICRO: ABNORMAL

## 2021-12-27 PROCEDURE — 87086 URINE CULTURE/COLONY COUNT: CPT | Performed by: INTERNAL MEDICINE

## 2021-12-27 PROCEDURE — 92610 EVALUATE SWALLOWING FUNCTION: CPT

## 2021-12-27 PROCEDURE — C9399 UNCLASSIFIED DRUGS OR BIOLOG: HCPCS | Performed by: NURSE PRACTITIONER

## 2021-12-27 PROCEDURE — 25000003 PHARM REV CODE 250: Performed by: INTERNAL MEDICINE

## 2021-12-27 PROCEDURE — 97166 OT EVAL MOD COMPLEX 45 MIN: CPT

## 2021-12-27 PROCEDURE — 87106 FUNGI IDENTIFICATION YEAST: CPT | Performed by: INTERNAL MEDICINE

## 2021-12-27 PROCEDURE — 97535 SELF CARE MNGMENT TRAINING: CPT

## 2021-12-27 PROCEDURE — 87088 URINE BACTERIA CULTURE: CPT | Performed by: INTERNAL MEDICINE

## 2021-12-27 PROCEDURE — 81000 URINALYSIS NONAUTO W/SCOPE: CPT | Performed by: INTERNAL MEDICINE

## 2021-12-27 PROCEDURE — 25000003 PHARM REV CODE 250: Performed by: NURSE PRACTITIONER

## 2021-12-27 PROCEDURE — 11000001 HC ACUTE MED/SURG PRIVATE ROOM

## 2021-12-27 PROCEDURE — 97162 PT EVAL MOD COMPLEX 30 MIN: CPT

## 2021-12-27 PROCEDURE — 94761 N-INVAS EAR/PLS OXIMETRY MLT: CPT

## 2021-12-27 PROCEDURE — 63600175 PHARM REV CODE 636 W HCPCS: Performed by: INTERNAL MEDICINE

## 2021-12-27 RX ORDER — LORAZEPAM 2 MG/ML
0.5 INJECTION INTRAMUSCULAR ONCE AS NEEDED
Status: COMPLETED | OUTPATIENT
Start: 2021-12-27 | End: 2021-12-27

## 2021-12-27 RX ADMIN — HEPARIN SODIUM 5000 UNITS: 5000 INJECTION INTRAVENOUS; SUBCUTANEOUS at 05:12

## 2021-12-27 RX ADMIN — HEPARIN SODIUM 5000 UNITS: 5000 INJECTION INTRAVENOUS; SUBCUTANEOUS at 01:12

## 2021-12-27 RX ADMIN — INSULIN ASPART 4 UNITS: 100 INJECTION, SOLUTION INTRAVENOUS; SUBCUTANEOUS at 05:12

## 2021-12-27 RX ADMIN — MUPIROCIN: 20 OINTMENT TOPICAL at 08:12

## 2021-12-27 RX ADMIN — INSULIN DETEMIR 10 UNITS: 100 INJECTION, SOLUTION SUBCUTANEOUS at 09:12

## 2021-12-27 RX ADMIN — MUPIROCIN: 20 OINTMENT TOPICAL at 09:12

## 2021-12-27 RX ADMIN — HEPARIN SODIUM 5000 UNITS: 5000 INJECTION INTRAVENOUS; SUBCUTANEOUS at 09:12

## 2021-12-27 RX ADMIN — INSULIN ASPART 2 UNITS: 100 INJECTION, SOLUTION INTRAVENOUS; SUBCUTANEOUS at 09:12

## 2021-12-27 RX ADMIN — LORAZEPAM 0.5 MG: 2 INJECTION INTRAMUSCULAR; INTRAVENOUS at 01:12

## 2021-12-28 LAB
AORTIC ROOT ANNULUS: 3.17 CM
AORTIC VALVE CUSP SEPERATION: 2.19 CM
AV INDEX (PROSTH): 0.92
AV MEAN GRADIENT: 3 MMHG
AV PEAK GRADIENT: 5 MMHG
AV VALVE AREA: 2.98 CM2
AV VELOCITY RATIO: 0.76
BSA FOR ECHO PROCEDURE: 1.87 M2
CV ECHO LV RWT: 0.67 CM
DOP CALC AO PEAK VEL: 1.13 M/S
DOP CALC AO VTI: 18.69 CM
DOP CALC LVOT AREA: 3.2 CM2
DOP CALC LVOT DIAMETER: 2.03 CM
DOP CALC LVOT PEAK VEL: 0.86 M/S
DOP CALC LVOT STROKE VOLUME: 55.77 CM3
DOP CALC MV VTI: 16.79 CM
DOP CALCLVOT PEAK VEL VTI: 17.24 CM
E WAVE DECELERATION TIME: 153.36 MSEC
E/A RATIO: 0.66
E/E' RATIO: 12.4 M/S
ECHO LV POSTERIOR WALL: 1.41 CM (ref 0.6–1.1)
EJECTION FRACTION: 65 %
FRACTIONAL SHORTENING: 44 % (ref 28–44)
INTERVENTRICULAR SEPTUM: 1.46 CM (ref 0.6–1.1)
LEFT ATRIUM SIZE: 3.14 CM
LEFT INTERNAL DIMENSION IN SYSTOLE: 2.35 CM (ref 2.1–4)
LEFT VENTRICLE DIASTOLIC VOLUME INDEX: 41.83 ML/M2
LEFT VENTRICLE DIASTOLIC VOLUME: 77.81 ML
LEFT VENTRICLE MASS INDEX: 124 G/M2
LEFT VENTRICLE SYSTOLIC VOLUME INDEX: 10.3 ML/M2
LEFT VENTRICLE SYSTOLIC VOLUME: 19.16 ML
LEFT VENTRICULAR INTERNAL DIMENSION IN DIASTOLE: 4.18 CM (ref 3.5–6)
LEFT VENTRICULAR MASS: 231.37 G
LV LATERAL E/E' RATIO: 12.4 M/S
LV SEPTAL E/E' RATIO: 12.4 M/S
MV MEAN GRADIENT: 1 MMHG
MV PEAK A VEL: 0.94 M/S
MV PEAK E VEL: 0.62 M/S
MV PEAK GRADIENT: 5 MMHG
MV STENOSIS PRESSURE HALF TIME: 48.95 MS
MV VALVE AREA BY CONTINUITY EQUATION: 3.32 CM2
MV VALVE AREA P 1/2 METHOD: 4.49 CM2
POCT GLUCOSE: 134 MG/DL (ref 70–110)
POCT GLUCOSE: 156 MG/DL (ref 70–110)
POCT GLUCOSE: 156 MG/DL (ref 70–110)
POCT GLUCOSE: 166 MG/DL (ref 70–110)
PV PEAK VELOCITY: 0.72 CM/S
RA PRESSURE: 3 MMHG
RV TISSUE DOPPLER FREE WALL SYSTOLIC VELOCITY 1 (APICAL 4 CHAMBER VIEW): 21.71 CM/S
TDI LATERAL: 0.05 M/S
TDI SEPTAL: 0.05 M/S
TDI: 0.05 M/S

## 2021-12-28 PROCEDURE — 25000003 PHARM REV CODE 250: Performed by: INTERNAL MEDICINE

## 2021-12-28 PROCEDURE — 25000003 PHARM REV CODE 250: Performed by: NURSE PRACTITIONER

## 2021-12-28 PROCEDURE — 11000001 HC ACUTE MED/SURG PRIVATE ROOM

## 2021-12-28 PROCEDURE — 92526 ORAL FUNCTION THERAPY: CPT

## 2021-12-28 PROCEDURE — C9399 UNCLASSIFIED DRUGS OR BIOLOG: HCPCS | Performed by: NURSE PRACTITIONER

## 2021-12-28 PROCEDURE — 63600175 PHARM REV CODE 636 W HCPCS: Performed by: NURSE PRACTITIONER

## 2021-12-28 PROCEDURE — 97530 THERAPEUTIC ACTIVITIES: CPT | Mod: CQ

## 2021-12-28 PROCEDURE — 94761 N-INVAS EAR/PLS OXIMETRY MLT: CPT

## 2021-12-28 PROCEDURE — 63600175 PHARM REV CODE 636 W HCPCS: Performed by: INTERNAL MEDICINE

## 2021-12-28 RX ADMIN — ATORVASTATIN CALCIUM 80 MG: 40 TABLET, FILM COATED ORAL at 09:12

## 2021-12-28 RX ADMIN — INSULIN ASPART 2 UNITS: 100 INJECTION, SOLUTION INTRAVENOUS; SUBCUTANEOUS at 05:12

## 2021-12-28 RX ADMIN — CLOPIDOGREL 75 MG: 75 TABLET, FILM COATED ORAL at 09:12

## 2021-12-28 RX ADMIN — MUPIROCIN: 20 OINTMENT TOPICAL at 09:12

## 2021-12-28 RX ADMIN — FAMOTIDINE 20 MG: 20 TABLET, FILM COATED ORAL at 09:12

## 2021-12-28 RX ADMIN — INSULIN DETEMIR 10 UNITS: 100 INJECTION, SOLUTION SUBCUTANEOUS at 09:12

## 2021-12-28 RX ADMIN — INSULIN ASPART 1 UNITS: 100 INJECTION, SOLUTION INTRAVENOUS; SUBCUTANEOUS at 09:12

## 2021-12-28 RX ADMIN — HEPARIN SODIUM 5000 UNITS: 5000 INJECTION INTRAVENOUS; SUBCUTANEOUS at 09:12

## 2021-12-28 RX ADMIN — HEPARIN SODIUM 5000 UNITS: 5000 INJECTION INTRAVENOUS; SUBCUTANEOUS at 05:12

## 2021-12-28 RX ADMIN — HEPARIN SODIUM 5000 UNITS: 5000 INJECTION INTRAVENOUS; SUBCUTANEOUS at 01:12

## 2021-12-28 RX ADMIN — ASPIRIN 81 MG CHEWABLE TABLET 81 MG: 81 TABLET CHEWABLE at 09:12

## 2021-12-29 VITALS
HEIGHT: 68 IN | RESPIRATION RATE: 17 BRPM | DIASTOLIC BLOOD PRESSURE: 75 MMHG | OXYGEN SATURATION: 95 % | WEIGHT: 160 LBS | HEART RATE: 82 BPM | SYSTOLIC BLOOD PRESSURE: 172 MMHG | TEMPERATURE: 98 F | BODY MASS INDEX: 24.25 KG/M2

## 2021-12-29 LAB
POCT GLUCOSE: 125 MG/DL (ref 70–110)
POCT GLUCOSE: 237 MG/DL (ref 70–110)

## 2021-12-29 PROCEDURE — 95819 EEG AWAKE AND ASLEEP: CPT

## 2021-12-29 PROCEDURE — 63600175 PHARM REV CODE 636 W HCPCS: Performed by: INTERNAL MEDICINE

## 2021-12-29 PROCEDURE — 97530 THERAPEUTIC ACTIVITIES: CPT | Mod: CQ

## 2021-12-29 PROCEDURE — 25000003 PHARM REV CODE 250: Performed by: INTERNAL MEDICINE

## 2021-12-29 PROCEDURE — 94761 N-INVAS EAR/PLS OXIMETRY MLT: CPT

## 2021-12-29 RX ORDER — FAMOTIDINE 20 MG/1
20 TABLET, FILM COATED ORAL DAILY
Status: DISCONTINUED | OUTPATIENT
Start: 2021-12-30 | End: 2021-12-29 | Stop reason: HOSPADM

## 2021-12-29 RX ORDER — AMLODIPINE BESYLATE 5 MG/1
10 TABLET ORAL DAILY
Status: DISCONTINUED | OUTPATIENT
Start: 2021-12-29 | End: 2021-12-29 | Stop reason: HOSPADM

## 2021-12-29 RX ORDER — LISINOPRIL 10 MG/1
20 TABLET ORAL DAILY
Status: DISCONTINUED | OUTPATIENT
Start: 2021-12-29 | End: 2021-12-29 | Stop reason: HOSPADM

## 2021-12-29 RX ORDER — HYDROCHLOROTHIAZIDE 25 MG/1
25 TABLET ORAL DAILY
Status: DISCONTINUED | OUTPATIENT
Start: 2021-12-29 | End: 2021-12-29 | Stop reason: HOSPADM

## 2021-12-29 RX ADMIN — MUPIROCIN: 20 OINTMENT TOPICAL at 10:12

## 2021-12-29 RX ADMIN — LISINOPRIL 20 MG: 10 TABLET ORAL at 10:12

## 2021-12-29 RX ADMIN — HYDROCHLOROTHIAZIDE 25 MG: 25 TABLET ORAL at 10:12

## 2021-12-29 RX ADMIN — AMLODIPINE BESYLATE 10 MG: 5 TABLET ORAL at 10:12

## 2021-12-29 RX ADMIN — INSULIN ASPART 4 UNITS: 100 INJECTION, SOLUTION INTRAVENOUS; SUBCUTANEOUS at 12:12

## 2021-12-29 RX ADMIN — CLOPIDOGREL 75 MG: 75 TABLET, FILM COATED ORAL at 10:12

## 2021-12-29 RX ADMIN — FAMOTIDINE 20 MG: 20 TABLET, FILM COATED ORAL at 10:12

## 2021-12-29 RX ADMIN — HEPARIN SODIUM 5000 UNITS: 5000 INJECTION INTRAVENOUS; SUBCUTANEOUS at 06:12

## 2021-12-29 RX ADMIN — ASPIRIN 81 MG CHEWABLE TABLET 81 MG: 81 TABLET CHEWABLE at 10:12

## 2021-12-29 RX ADMIN — HEPARIN SODIUM 5000 UNITS: 5000 INJECTION INTRAVENOUS; SUBCUTANEOUS at 01:12

## 2021-12-30 LAB — BACTERIA UR CULT: ABNORMAL

## 2023-05-28 NOTE — PLAN OF CARE
Problem: Patient Care Overview  Goal: Plan of Care Review  Outcome: Ongoing (interventions implemented as appropriate)  Awake alert, therapies per schedule, no s/s  Acute distress.        same name as above